# Patient Record
Sex: FEMALE | Race: BLACK OR AFRICAN AMERICAN | Employment: UNEMPLOYED | URBAN - METROPOLITAN AREA
[De-identification: names, ages, dates, MRNs, and addresses within clinical notes are randomized per-mention and may not be internally consistent; named-entity substitution may affect disease eponyms.]

---

## 2021-01-01 ENCOUNTER — APPOINTMENT (OUTPATIENT)
Dept: GENERAL RADIOLOGY | Age: 36
DRG: 720 | End: 2021-01-01
Attending: INTERNAL MEDICINE
Payer: MEDICAID

## 2021-01-01 ENCOUNTER — APPOINTMENT (OUTPATIENT)
Dept: CT IMAGING | Age: 36
DRG: 720 | End: 2021-01-01
Attending: STUDENT IN AN ORGANIZED HEALTH CARE EDUCATION/TRAINING PROGRAM
Payer: MEDICAID

## 2021-01-01 ENCOUNTER — ANESTHESIA (OUTPATIENT)
Dept: ENDOSCOPY | Age: 36
DRG: 720 | End: 2021-01-01
Payer: MEDICAID

## 2021-01-01 ENCOUNTER — APPOINTMENT (OUTPATIENT)
Dept: INTERVENTIONAL RADIOLOGY/VASCULAR | Age: 36
DRG: 720 | End: 2021-01-01
Attending: INTERNAL MEDICINE
Payer: MEDICAID

## 2021-01-01 ENCOUNTER — APPOINTMENT (OUTPATIENT)
Dept: GENERAL RADIOLOGY | Age: 36
DRG: 720 | End: 2021-01-01
Attending: STUDENT IN AN ORGANIZED HEALTH CARE EDUCATION/TRAINING PROGRAM
Payer: MEDICAID

## 2021-01-01 ENCOUNTER — ANESTHESIA EVENT (OUTPATIENT)
Dept: ENDOSCOPY | Age: 36
DRG: 720 | End: 2021-01-01
Payer: MEDICAID

## 2021-01-01 ENCOUNTER — APPOINTMENT (OUTPATIENT)
Dept: GENERAL RADIOLOGY | Age: 36
DRG: 720 | End: 2021-01-01
Attending: RADIOLOGY
Payer: MEDICAID

## 2021-01-01 ENCOUNTER — APPOINTMENT (OUTPATIENT)
Dept: CT IMAGING | Age: 36
DRG: 720 | End: 2021-01-01
Attending: INTERNAL MEDICINE
Payer: MEDICAID

## 2021-01-01 ENCOUNTER — APPOINTMENT (OUTPATIENT)
Dept: ENDOSCOPY | Age: 36
DRG: 720 | End: 2021-01-01
Attending: INTERNAL MEDICINE
Payer: MEDICAID

## 2021-01-01 ENCOUNTER — APPOINTMENT (OUTPATIENT)
Dept: GENERAL RADIOLOGY | Age: 36
DRG: 720 | End: 2021-01-01
Attending: HOSPITALIST
Payer: MEDICAID

## 2021-01-01 ENCOUNTER — HOSPITAL ENCOUNTER (INPATIENT)
Age: 36
LOS: 16 days | DRG: 720 | End: 2021-11-02
Attending: STUDENT IN AN ORGANIZED HEALTH CARE EDUCATION/TRAINING PROGRAM | Admitting: INTERNAL MEDICINE
Payer: MEDICAID

## 2021-01-01 ENCOUNTER — ANESTHESIA (OUTPATIENT)
Dept: ICU | Age: 36
DRG: 720 | End: 2021-01-01
Payer: MEDICAID

## 2021-01-01 ENCOUNTER — ANESTHESIA EVENT (OUTPATIENT)
Dept: ICU | Age: 36
DRG: 720 | End: 2021-01-01
Payer: MEDICAID

## 2021-01-01 VITALS — WEIGHT: 74.96 LBS | HEIGHT: 66 IN | BODY MASS INDEX: 12.05 KG/M2 | OXYGEN SATURATION: 94 % | TEMPERATURE: 97.6 F

## 2021-01-01 DIAGNOSIS — J18.9 MULTIFOCAL PNEUMONIA: Primary | ICD-10-CM

## 2021-01-01 DIAGNOSIS — I21.3 ST ELEVATION MYOCARDIAL INFARCTION (STEMI), UNSPECIFIED ARTERY (HCC): ICD-10-CM

## 2021-01-01 DIAGNOSIS — R62.7 FAILURE TO THRIVE IN ADULT: ICD-10-CM

## 2021-01-01 LAB
ALBUMIN SERPL-MCNC: 1.6 G/DL (ref 3.5–5)
ALBUMIN SERPL-MCNC: 1.7 G/DL (ref 3.5–5)
ALBUMIN SERPL-MCNC: 1.8 G/DL (ref 3.5–5)
ALBUMIN SERPL-MCNC: 2 G/DL (ref 3.5–5)
ALBUMIN/GLOB SERPL: 0.4 {RATIO} (ref 1.1–2.2)
ALBUMIN/GLOB SERPL: 0.5 {RATIO} (ref 1.1–2.2)
ALP SERPL-CCNC: 70 U/L (ref 45–117)
ALP SERPL-CCNC: 93 U/L (ref 45–117)
ALT SERPL-CCNC: 13 U/L (ref 12–78)
ALT SERPL-CCNC: 40 U/L (ref 12–78)
ANION GAP SERPL CALC-SCNC: 15 MMOL/L (ref 5–15)
ANION GAP SERPL CALC-SCNC: 15 MMOL/L (ref 5–15)
ANION GAP SERPL CALC-SCNC: 3 MMOL/L (ref 5–15)
ANION GAP SERPL CALC-SCNC: 3 MMOL/L (ref 5–15)
ANION GAP SERPL CALC-SCNC: 4 MMOL/L (ref 5–15)
ANION GAP SERPL CALC-SCNC: 5 MMOL/L (ref 5–15)
ANION GAP SERPL CALC-SCNC: 6 MMOL/L (ref 5–15)
ANION GAP SERPL CALC-SCNC: 7 MMOL/L (ref 5–15)
APPEARANCE UR: ABNORMAL
ARTERIAL PATENCY WRIST A: ABNORMAL
AST SERPL W P-5'-P-CCNC: 25 U/L (ref 15–37)
AST SERPL W P-5'-P-CCNC: 27 U/L (ref 15–37)
ATRIAL RATE: 104 BPM
ATRIAL RATE: 110 BPM
ATRIAL RATE: 138 BPM
BACTERIA SPEC CULT: ABNORMAL
BACTERIA SPEC CULT: ABNORMAL
BACTERIA SPEC CULT: NORMAL
BACTERIA URNS QL MICRO: NEGATIVE /HPF
BASE EXCESS BLDA CALC-SCNC: 0.9 MMOL/L (ref 0–2)
BASE EXCESS BLDA CALC-SCNC: 4.4 MMOL/L (ref 0–2)
BASE EXCESS BLDA CALC-SCNC: 5.6 MMOL/L (ref 0–2)
BASE EXCESS BLDA CALC-SCNC: 5.6 MMOL/L (ref 0–2)
BASE EXCESS BLDA CALC-SCNC: 6.7 MMOL/L (ref 0–2)
BASE EXCESS BLDA CALC-SCNC: 7.8 MMOL/L (ref 0–2)
BASOPHILS # BLD: 0 K/UL (ref 0–0.1)
BASOPHILS NFR BLD: 0 % (ref 0–1)
BDY SITE: ABNORMAL
BILIRUB SERPL-MCNC: 0.5 MG/DL (ref 0.2–1)
BILIRUB SERPL-MCNC: 0.7 MG/DL (ref 0.2–1)
BILIRUB UR QL: NEGATIVE
BUN SERPL-MCNC: 10 MG/DL (ref 6–20)
BUN SERPL-MCNC: 13 MG/DL (ref 6–20)
BUN SERPL-MCNC: 14 MG/DL (ref 6–20)
BUN SERPL-MCNC: 25 MG/DL (ref 6–20)
BUN SERPL-MCNC: 3 MG/DL (ref 6–20)
BUN SERPL-MCNC: 35 MG/DL (ref 6–20)
BUN SERPL-MCNC: 36 MG/DL (ref 6–20)
BUN SERPL-MCNC: 36 MG/DL (ref 6–20)
BUN SERPL-MCNC: 6 MG/DL (ref 6–20)
BUN SERPL-MCNC: 9 MG/DL (ref 6–20)
BUN/CREAT SERPL: 12 (ref 12–20)
BUN/CREAT SERPL: 144 (ref 12–20)
BUN/CREAT SERPL: 144 (ref 12–20)
BUN/CREAT SERPL: 35 (ref 12–20)
BUN/CREAT SERPL: 35 (ref 12–20)
BUN/CREAT SERPL: 50 (ref 12–20)
BUN/CREAT SERPL: 89 (ref 12–20)
BUN/CREAT SERPL: 97 (ref 12–20)
BUN/CREAT SERPL: ABNORMAL (ref 12–20)
CA-I BLD-MCNC: 7.2 MG/DL (ref 8.5–10.1)
CA-I BLD-MCNC: 7.2 MG/DL (ref 8.5–10.1)
CA-I BLD-MCNC: 7.9 MG/DL (ref 8.5–10.1)
CA-I BLD-MCNC: 8 MG/DL (ref 8.5–10.1)
CA-I BLD-MCNC: 8.5 MG/DL (ref 8.5–10.1)
CA-I BLD-MCNC: 8.7 MG/DL (ref 8.5–10.1)
CA-I BLD-MCNC: 8.7 MG/DL (ref 8.5–10.1)
CA-I BLD-MCNC: 8.8 MG/DL (ref 8.5–10.1)
CALCULATED P AXIS, ECG09: 69 DEGREES
CALCULATED P AXIS, ECG09: 87 DEGREES
CALCULATED P AXIS, ECG09: 90 DEGREES
CALCULATED R AXIS, ECG10: 102 DEGREES
CALCULATED R AXIS, ECG10: 57 DEGREES
CALCULATED R AXIS, ECG10: 79 DEGREES
CALCULATED T AXIS, ECG11: 66 DEGREES
CALCULATED T AXIS, ECG11: 86 DEGREES
CALCULATED T AXIS, ECG11: 97 DEGREES
CHLORIDE SERPL-SCNC: 103 MMOL/L (ref 97–108)
CHLORIDE SERPL-SCNC: 108 MMOL/L (ref 97–108)
CHLORIDE SERPL-SCNC: 108 MMOL/L (ref 97–108)
CHLORIDE SERPL-SCNC: 109 MMOL/L (ref 97–108)
CHLORIDE SERPL-SCNC: 110 MMOL/L (ref 97–108)
CHLORIDE SERPL-SCNC: 112 MMOL/L (ref 97–108)
CHLORIDE SERPL-SCNC: 113 MMOL/L (ref 97–108)
CHLORIDE SERPL-SCNC: 126 MMOL/L (ref 97–108)
CHLORIDE SERPL-SCNC: 128 MMOL/L (ref 97–108)
CO2 SERPL-SCNC: 23 MMOL/L (ref 21–32)
CO2 SERPL-SCNC: 23 MMOL/L (ref 21–32)
CO2 SERPL-SCNC: 25 MMOL/L (ref 21–32)
CO2 SERPL-SCNC: 26 MMOL/L (ref 21–32)
CO2 SERPL-SCNC: 28 MMOL/L (ref 21–32)
CO2 SERPL-SCNC: 29 MMOL/L (ref 21–32)
CO2 SERPL-SCNC: 30 MMOL/L (ref 21–32)
CO2 SERPL-SCNC: 30 MMOL/L (ref 21–32)
CO2 SERPL-SCNC: 31 MMOL/L (ref 21–32)
COLOR UR: ABNORMAL
COVID-19 RAPID TEST, COVR: NOT DETECTED
CREAT SERPL-MCNC: 0.2 MG/DL (ref 0.55–1.02)
CREAT SERPL-MCNC: 0.25 MG/DL (ref 0.55–1.02)
CREAT SERPL-MCNC: 0.26 MG/DL (ref 0.55–1.02)
CREAT SERPL-MCNC: 0.26 MG/DL (ref 0.55–1.02)
CREAT SERPL-MCNC: 0.28 MG/DL (ref 0.55–1.02)
CREAT SERPL-MCNC: 0.36 MG/DL (ref 0.55–1.02)
CREAT SERPL-MCNC: <0.15 MG/DL (ref 0.55–1.02)
DIAGNOSIS, 93000: NORMAL
DIFFERENTIAL METHOD BLD: ABNORMAL
EOSINOPHIL # BLD: 0 K/UL (ref 0–0.4)
EOSINOPHIL # BLD: 0.1 K/UL (ref 0–0.4)
EOSINOPHIL NFR BLD: 0 % (ref 0–7)
EOSINOPHIL NFR BLD: 1 % (ref 0–7)
EOSINOPHIL NFR BLD: 1 % (ref 0–7)
EPAP/CPAP/PEEP, PAPEEP: 10
EPAP/CPAP/PEEP, PAPEEP: 10
EPAP/CPAP/PEEP, PAPEEP: 12
EPAP/CPAP/PEEP, PAPEEP: 5
ERYTHROCYTE [DISTWIDTH] IN BLOOD BY AUTOMATED COUNT: 13.4 % (ref 11.5–14.5)
ERYTHROCYTE [DISTWIDTH] IN BLOOD BY AUTOMATED COUNT: 13.5 % (ref 11.5–14.5)
ERYTHROCYTE [DISTWIDTH] IN BLOOD BY AUTOMATED COUNT: 13.6 % (ref 11.5–14.5)
ERYTHROCYTE [DISTWIDTH] IN BLOOD BY AUTOMATED COUNT: 13.9 % (ref 11.5–14.5)
ERYTHROCYTE [DISTWIDTH] IN BLOOD BY AUTOMATED COUNT: 14.1 % (ref 11.5–14.5)
ERYTHROCYTE [DISTWIDTH] IN BLOOD BY AUTOMATED COUNT: 14.2 % (ref 11.5–14.5)
ERYTHROCYTE [DISTWIDTH] IN BLOOD BY AUTOMATED COUNT: 14.2 % (ref 11.5–14.5)
ERYTHROCYTE [DISTWIDTH] IN BLOOD BY AUTOMATED COUNT: 14.3 % (ref 11.5–14.5)
ERYTHROCYTE [DISTWIDTH] IN BLOOD BY AUTOMATED COUNT: 14.5 % (ref 11.5–14.5)
ERYTHROCYTE [DISTWIDTH] IN BLOOD BY AUTOMATED COUNT: 14.6 % (ref 11.5–14.5)
ERYTHROCYTE [DISTWIDTH] IN BLOOD BY AUTOMATED COUNT: 14.7 % (ref 11.5–14.5)
ERYTHROCYTE [DISTWIDTH] IN BLOOD BY AUTOMATED COUNT: 15.1 % (ref 11.5–14.5)
FIO2 ON VENT: 100 %
FIO2 ON VENT: 28 %
FIO2 ON VENT: 50 %
GAS FLOW.O2 O2 DELIVERY SYS: 2 L/MIN
GAS FLOW.O2 O2 DELIVERY SYS: 21 L/MIN
GAS FLOW.O2 SETTING OXYMISER: 12 L/MIN
GAS FLOW.O2 SETTING OXYMISER: 16 L/MIN
GLOBULIN SER CALC-MCNC: 3.6 G/DL (ref 2–4)
GLOBULIN SER CALC-MCNC: 4.6 G/DL (ref 2–4)
GLUCOSE BLD STRIP.AUTO-MCNC: 101 MG/DL (ref 65–117)
GLUCOSE BLD STRIP.AUTO-MCNC: 102 MG/DL (ref 65–117)
GLUCOSE BLD STRIP.AUTO-MCNC: 103 MG/DL (ref 65–117)
GLUCOSE BLD STRIP.AUTO-MCNC: 106 MG/DL (ref 65–117)
GLUCOSE BLD STRIP.AUTO-MCNC: 110 MG/DL (ref 65–117)
GLUCOSE BLD STRIP.AUTO-MCNC: 112 MG/DL (ref 65–117)
GLUCOSE BLD STRIP.AUTO-MCNC: 119 MG/DL (ref 65–117)
GLUCOSE BLD STRIP.AUTO-MCNC: 122 MG/DL (ref 65–117)
GLUCOSE BLD STRIP.AUTO-MCNC: 123 MG/DL (ref 65–117)
GLUCOSE BLD STRIP.AUTO-MCNC: 125 MG/DL (ref 65–117)
GLUCOSE BLD STRIP.AUTO-MCNC: 126 MG/DL (ref 65–117)
GLUCOSE BLD STRIP.AUTO-MCNC: 141 MG/DL (ref 65–117)
GLUCOSE BLD STRIP.AUTO-MCNC: 143 MG/DL (ref 65–117)
GLUCOSE BLD STRIP.AUTO-MCNC: 191 MG/DL (ref 65–117)
GLUCOSE BLD STRIP.AUTO-MCNC: 42 MG/DL (ref 65–117)
GLUCOSE BLD STRIP.AUTO-MCNC: 56 MG/DL (ref 65–117)
GLUCOSE BLD STRIP.AUTO-MCNC: 70 MG/DL (ref 65–117)
GLUCOSE BLD STRIP.AUTO-MCNC: 80 MG/DL (ref 65–117)
GLUCOSE BLD STRIP.AUTO-MCNC: 88 MG/DL (ref 65–117)
GLUCOSE SERPL-MCNC: 100 MG/DL (ref 65–100)
GLUCOSE SERPL-MCNC: 105 MG/DL (ref 65–100)
GLUCOSE SERPL-MCNC: 112 MG/DL (ref 65–100)
GLUCOSE SERPL-MCNC: 119 MG/DL (ref 65–100)
GLUCOSE SERPL-MCNC: 123 MG/DL (ref 65–100)
GLUCOSE SERPL-MCNC: 139 MG/DL (ref 65–100)
GLUCOSE SERPL-MCNC: 140 MG/DL (ref 65–100)
GLUCOSE SERPL-MCNC: 170 MG/DL (ref 65–100)
GLUCOSE SERPL-MCNC: 88 MG/DL (ref 65–100)
GLUCOSE SERPL-MCNC: 92 MG/DL (ref 65–100)
GLUCOSE SERPL-MCNC: 93 MG/DL (ref 65–100)
GLUCOSE SERPL-MCNC: 93 MG/DL (ref 65–100)
GLUCOSE SERPL-MCNC: 96 MG/DL (ref 65–100)
GLUCOSE UR STRIP.AUTO-MCNC: NEGATIVE MG/DL
GRAN CASTS URNS QL MICRO: 41 /LPF
HCO3 BLDA-SCNC: 25 MMOL/L (ref 22–26)
HCO3 BLDA-SCNC: 28 MMOL/L (ref 22–26)
HCO3 BLDA-SCNC: 30 MMOL/L (ref 22–26)
HCT VFR BLD AUTO: 28.6 % (ref 35–47)
HCT VFR BLD AUTO: 30.5 % (ref 35–47)
HCT VFR BLD AUTO: 31 % (ref 35–47)
HCT VFR BLD AUTO: 31 % (ref 35–47)
HCT VFR BLD AUTO: 32.8 % (ref 35–47)
HCT VFR BLD AUTO: 36.4 % (ref 35–47)
HCT VFR BLD AUTO: 39.8 % (ref 35–47)
HCT VFR BLD AUTO: 40.5 % (ref 35–47)
HCT VFR BLD AUTO: 44.5 % (ref 35–47)
HCT VFR BLD AUTO: 48.9 % (ref 35–47)
HCT VFR BLD AUTO: 49.1 % (ref 35–47)
HCT VFR BLD AUTO: 57.7 % (ref 35–47)
HGB BLD-MCNC: 10 G/DL (ref 11.5–16)
HGB BLD-MCNC: 10.5 G/DL (ref 11.5–16)
HGB BLD-MCNC: 11.5 G/DL (ref 11.5–16)
HGB BLD-MCNC: 12.9 G/DL (ref 11.5–16)
HGB BLD-MCNC: 13 G/DL (ref 11.5–16)
HGB BLD-MCNC: 13.4 G/DL (ref 11.5–16)
HGB BLD-MCNC: 14.2 G/DL (ref 11.5–16)
HGB BLD-MCNC: 14.6 G/DL (ref 11.5–16)
HGB BLD-MCNC: 16.9 G/DL (ref 11.5–16)
HGB BLD-MCNC: 9.3 G/DL (ref 11.5–16)
HGB BLD-MCNC: 9.7 G/DL (ref 11.5–16)
HGB BLD-MCNC: 9.8 G/DL (ref 11.5–16)
HGB UR QL STRIP: ABNORMAL
IMM GRANULOCYTES # BLD AUTO: 0 K/UL
IMM GRANULOCYTES # BLD AUTO: 0 K/UL (ref 0–0.04)
IMM GRANULOCYTES # BLD AUTO: 0.1 K/UL
IMM GRANULOCYTES # BLD AUTO: 0.1 K/UL (ref 0–0.04)
IMM GRANULOCYTES # BLD AUTO: 0.2 K/UL (ref 0–0.04)
IMM GRANULOCYTES # BLD AUTO: 0.6 K/UL (ref 0–0.04)
IMM GRANULOCYTES NFR BLD AUTO: 0 %
IMM GRANULOCYTES NFR BLD AUTO: 0 % (ref 0–0.5)
IMM GRANULOCYTES NFR BLD AUTO: 0 % (ref 0–0.5)
IMM GRANULOCYTES NFR BLD AUTO: 1 %
IMM GRANULOCYTES NFR BLD AUTO: 1 % (ref 0–0.5)
IMM GRANULOCYTES NFR BLD AUTO: 2 % (ref 0–0.5)
KETONES UR QL STRIP.AUTO: 5 MG/DL
LACTATE SERPL-SCNC: 1.1 MMOL/L (ref 0.4–2)
LACTATE SERPL-SCNC: 1.8 MMOL/L (ref 0.4–2)
LACTATE SERPL-SCNC: 2.2 MMOL/L (ref 0.4–2)
LACTATE SERPL-SCNC: 3.2 MMOL/L (ref 0.4–2)
LACTATE SERPL-SCNC: 3.2 MMOL/L (ref 0.4–2)
LEUKOCYTE ESTERASE UR QL STRIP.AUTO: NEGATIVE
LYMPHOCYTES # BLD: 0.6 K/UL (ref 0.8–3.5)
LYMPHOCYTES # BLD: 1.1 K/UL (ref 0.8–3.5)
LYMPHOCYTES # BLD: 1.2 K/UL (ref 0.8–3.5)
LYMPHOCYTES # BLD: 1.2 K/UL (ref 0.8–3.5)
LYMPHOCYTES # BLD: 1.4 K/UL (ref 0.8–3.5)
LYMPHOCYTES # BLD: 1.4 K/UL (ref 0.8–3.5)
LYMPHOCYTES # BLD: 1.6 K/UL (ref 0.8–3.5)
LYMPHOCYTES # BLD: 1.6 K/UL (ref 0.8–3.5)
LYMPHOCYTES # BLD: 1.7 K/UL (ref 0.8–3.5)
LYMPHOCYTES # BLD: 2 K/UL (ref 0.8–3.5)
LYMPHOCYTES # BLD: 2.8 K/UL (ref 0.8–3.5)
LYMPHOCYTES NFR BLD: 10 % (ref 12–49)
LYMPHOCYTES NFR BLD: 10 % (ref 12–49)
LYMPHOCYTES NFR BLD: 12 % (ref 12–49)
LYMPHOCYTES NFR BLD: 14 % (ref 12–49)
LYMPHOCYTES NFR BLD: 15 % (ref 12–49)
LYMPHOCYTES NFR BLD: 19 % (ref 12–49)
LYMPHOCYTES NFR BLD: 32 % (ref 12–49)
LYMPHOCYTES NFR BLD: 4 % (ref 12–49)
LYMPHOCYTES NFR BLD: 8 % (ref 12–49)
MAGNESIUM SERPL-MCNC: 1.8 MG/DL (ref 1.6–2.4)
MAGNESIUM SERPL-MCNC: 1.9 MG/DL (ref 1.6–2.4)
MAGNESIUM SERPL-MCNC: 2 MG/DL (ref 1.6–2.4)
MAGNESIUM SERPL-MCNC: 2.3 MG/DL (ref 1.6–2.4)
MCH RBC QN AUTO: 27.7 PG (ref 26–34)
MCH RBC QN AUTO: 27.8 PG (ref 26–34)
MCH RBC QN AUTO: 27.9 PG (ref 26–34)
MCH RBC QN AUTO: 28 PG (ref 26–34)
MCH RBC QN AUTO: 28.1 PG (ref 26–34)
MCH RBC QN AUTO: 28.1 PG (ref 26–34)
MCH RBC QN AUTO: 28.2 PG (ref 26–34)
MCH RBC QN AUTO: 28.2 PG (ref 26–34)
MCH RBC QN AUTO: 28.3 PG (ref 26–34)
MCH RBC QN AUTO: 28.3 PG (ref 26–34)
MCHC RBC AUTO-ENTMCNC: 29 G/DL (ref 30–36.5)
MCHC RBC AUTO-ENTMCNC: 29.3 G/DL (ref 30–36.5)
MCHC RBC AUTO-ENTMCNC: 29.7 G/DL (ref 30–36.5)
MCHC RBC AUTO-ENTMCNC: 30.1 G/DL (ref 30–36.5)
MCHC RBC AUTO-ENTMCNC: 31.6 G/DL (ref 30–36.5)
MCHC RBC AUTO-ENTMCNC: 31.6 G/DL (ref 30–36.5)
MCHC RBC AUTO-ENTMCNC: 31.8 G/DL (ref 30–36.5)
MCHC RBC AUTO-ENTMCNC: 31.9 G/DL (ref 30–36.5)
MCHC RBC AUTO-ENTMCNC: 32 G/DL (ref 30–36.5)
MCHC RBC AUTO-ENTMCNC: 32.3 G/DL (ref 30–36.5)
MCHC RBC AUTO-ENTMCNC: 32.5 G/DL (ref 30–36.5)
MCHC RBC AUTO-ENTMCNC: 32.7 G/DL (ref 30–36.5)
MCV RBC AUTO: 86.1 FL (ref 80–99)
MCV RBC AUTO: 86.9 FL (ref 80–99)
MCV RBC AUTO: 87.3 FL (ref 80–99)
MCV RBC AUTO: 87.7 FL (ref 80–99)
MCV RBC AUTO: 87.9 FL (ref 80–99)
MCV RBC AUTO: 87.9 FL (ref 80–99)
MCV RBC AUTO: 89.3 FL (ref 80–99)
MCV RBC AUTO: 89.7 FL (ref 80–99)
MCV RBC AUTO: 92.9 FL (ref 80–99)
MCV RBC AUTO: 93.9 FL (ref 80–99)
MCV RBC AUTO: 94.7 FL (ref 80–99)
MCV RBC AUTO: 95.3 FL (ref 80–99)
MONOCYTES # BLD: 0.1 K/UL (ref 0–1)
MONOCYTES # BLD: 0.2 K/UL (ref 0–1)
MONOCYTES # BLD: 0.2 K/UL (ref 0–1)
MONOCYTES # BLD: 0.3 K/UL (ref 0–1)
MONOCYTES # BLD: 0.4 K/UL (ref 0–1)
MONOCYTES # BLD: 0.4 K/UL (ref 0–1)
MONOCYTES # BLD: 0.5 K/UL (ref 0–1)
MONOCYTES # BLD: 0.6 K/UL (ref 0–1)
MONOCYTES # BLD: 0.8 K/UL (ref 0–1)
MONOCYTES NFR BLD: 1 % (ref 5–13)
MONOCYTES NFR BLD: 1 % (ref 5–13)
MONOCYTES NFR BLD: 2 % (ref 5–13)
MONOCYTES NFR BLD: 2 % (ref 5–13)
MONOCYTES NFR BLD: 3 % (ref 5–13)
MONOCYTES NFR BLD: 5 % (ref 5–13)
MONOCYTES NFR BLD: 6 % (ref 5–13)
MONOCYTES NFR BLD: 8 % (ref 5–13)
MRSA DNA SPEC QL NAA+PROBE: NOT DETECTED
MUCOUS THREADS URNS QL MICRO: ABNORMAL /LPF
NEUTS SEG # BLD: 11.5 K/UL (ref 1.8–8)
NEUTS SEG # BLD: 12.6 K/UL (ref 1.8–8)
NEUTS SEG # BLD: 14 K/UL (ref 1.8–8)
NEUTS SEG # BLD: 16.6 K/UL (ref 1.8–8)
NEUTS SEG # BLD: 17.6 K/UL (ref 1.8–8)
NEUTS SEG # BLD: 22.2 K/UL (ref 1.8–8)
NEUTS SEG # BLD: 3.4 K/UL (ref 1.8–8)
NEUTS SEG # BLD: 4 K/UL (ref 1.8–8)
NEUTS SEG # BLD: 6.9 K/UL (ref 1.8–8)
NEUTS SEG # BLD: 8.2 K/UL (ref 1.8–8)
NEUTS SEG # BLD: 8.5 K/UL (ref 1.8–8)
NEUTS SEG NFR BLD: 62 % (ref 32–75)
NEUTS SEG NFR BLD: 77 % (ref 32–75)
NEUTS SEG NFR BLD: 79 % (ref 32–75)
NEUTS SEG NFR BLD: 80 % (ref 32–75)
NEUTS SEG NFR BLD: 84 % (ref 32–75)
NEUTS SEG NFR BLD: 84 % (ref 32–75)
NEUTS SEG NFR BLD: 85 % (ref 32–75)
NEUTS SEG NFR BLD: 87 % (ref 32–75)
NEUTS SEG NFR BLD: 87 % (ref 32–75)
NEUTS SEG NFR BLD: 89 % (ref 32–75)
NEUTS SEG NFR BLD: 92 % (ref 32–75)
NITRITE UR QL STRIP.AUTO: NEGATIVE
NRBC # BLD: 0 K/UL (ref 0–0.01)
NRBC # BLD: 0.02 K/UL (ref 0–0.01)
NRBC # BLD: 0.03 K/UL (ref 0–0.01)
NRBC BLD-RTO: 0 PER 100 WBC
NRBC BLD-RTO: 0.1 PER 100 WBC
NRBC BLD-RTO: 0.1 PER 100 WBC
NRBC BLD-RTO: 0.2 PER 100 WBC
P-R INTERVAL, ECG05: 118 MS
P-R INTERVAL, ECG05: 126 MS
P-R INTERVAL, ECG05: 146 MS
PCO2 BLDA: 34 MMHG (ref 35–45)
PCO2 BLDA: 35 MMHG (ref 35–45)
PCO2 BLDA: 36 MMHG (ref 35–45)
PCO2 BLDA: 39 MMHG (ref 35–45)
PCO2 BLDA: 40 MMHG (ref 35–45)
PCO2 BLDA: 46 MMHG (ref 35–45)
PERFORMED BY, TECHID: ABNORMAL
PERFORMED BY, TECHID: NORMAL
PH BLDA: 7.42 [PH] (ref 7.35–7.45)
PH BLDA: 7.42 [PH] (ref 7.35–7.45)
PH BLDA: 7.49 [PH] (ref 7.35–7.45)
PH BLDA: 7.52 [PH] (ref 7.35–7.45)
PH BLDA: 7.54 [PH] (ref 7.35–7.45)
PH BLDA: 7.55 [PH] (ref 7.35–7.45)
PH UR STRIP: 5 [PH] (ref 5–8)
PHOSPHATE SERPL-MCNC: 1.1 MG/DL (ref 2.6–4.7)
PHOSPHATE SERPL-MCNC: 1.4 MG/DL (ref 2.6–4.7)
PHOSPHATE SERPL-MCNC: 2 MG/DL (ref 2.6–4.7)
PHOSPHATE SERPL-MCNC: 2 MG/DL (ref 2.6–4.7)
PHOSPHATE SERPL-MCNC: 2.2 MG/DL (ref 2.6–4.7)
PHOSPHATE SERPL-MCNC: 2.6 MG/DL (ref 2.6–4.7)
PHOSPHATE SERPL-MCNC: 2.9 MG/DL (ref 2.6–4.7)
PLATELET # BLD AUTO: 166 K/UL (ref 150–400)
PLATELET # BLD AUTO: 172 K/UL (ref 150–400)
PLATELET # BLD AUTO: 175 K/UL (ref 150–400)
PLATELET # BLD AUTO: 184 K/UL (ref 150–400)
PLATELET # BLD AUTO: 192 K/UL (ref 150–400)
PLATELET # BLD AUTO: 198 K/UL (ref 150–400)
PLATELET # BLD AUTO: 214 K/UL (ref 150–400)
PLATELET # BLD AUTO: 224 K/UL (ref 150–400)
PLATELET # BLD AUTO: 232 K/UL (ref 150–400)
PLATELET # BLD AUTO: 240 K/UL (ref 150–400)
PLATELET # BLD AUTO: 272 K/UL (ref 150–400)
PLATELET # BLD AUTO: 277 K/UL (ref 150–400)
PMV BLD AUTO: 10 FL (ref 8.9–12.9)
PMV BLD AUTO: 10 FL (ref 8.9–12.9)
PMV BLD AUTO: 10.1 FL (ref 8.9–12.9)
PMV BLD AUTO: 10.2 FL (ref 8.9–12.9)
PMV BLD AUTO: 10.4 FL (ref 8.9–12.9)
PMV BLD AUTO: 10.5 FL (ref 8.9–12.9)
PMV BLD AUTO: 10.8 FL (ref 8.9–12.9)
PMV BLD AUTO: 11.7 FL (ref 8.9–12.9)
PMV BLD AUTO: 9.3 FL (ref 8.9–12.9)
PMV BLD AUTO: 9.4 FL (ref 8.9–12.9)
PMV BLD AUTO: 9.7 FL (ref 8.9–12.9)
PO2 BLDA: 115 MMHG (ref 75–100)
PO2 BLDA: 123 MMHG (ref 75–100)
PO2 BLDA: 217 MMHG (ref 75–100)
PO2 BLDA: 300 MMHG (ref 75–100)
PO2 BLDA: 41 MMHG (ref 75–100)
PO2 BLDA: 43 MMHG (ref 75–100)
POTASSIUM SERPL-SCNC: 2.4 MMOL/L (ref 3.5–5.1)
POTASSIUM SERPL-SCNC: 2.4 MMOL/L (ref 3.5–5.1)
POTASSIUM SERPL-SCNC: 2.6 MMOL/L (ref 3.5–5.1)
POTASSIUM SERPL-SCNC: 3 MMOL/L (ref 3.5–5.1)
POTASSIUM SERPL-SCNC: 3.2 MMOL/L (ref 3.5–5.1)
POTASSIUM SERPL-SCNC: 3.6 MMOL/L (ref 3.5–5.1)
POTASSIUM SERPL-SCNC: 3.6 MMOL/L (ref 3.5–5.1)
POTASSIUM SERPL-SCNC: 3.9 MMOL/L (ref 3.5–5.1)
POTASSIUM SERPL-SCNC: 4 MMOL/L (ref 3.5–5.1)
POTASSIUM SERPL-SCNC: 4.1 MMOL/L (ref 3.5–5.1)
POTASSIUM SERPL-SCNC: 4.2 MMOL/L (ref 3.5–5.1)
POTASSIUM SERPL-SCNC: 4.2 MMOL/L (ref 3.5–5.1)
POTASSIUM SERPL-SCNC: 4.3 MMOL/L (ref 3.5–5.1)
POTASSIUM SERPL-SCNC: 4.8 MMOL/L (ref 3.5–5.1)
PRESSURE SUPPORT SETTING VENT: 8 CM[H2O]
PROCALCITONIN SERPL-MCNC: 0.84 NG/ML
PROCALCITONIN SERPL-MCNC: 1.93 NG/ML
PROCALCITONIN SERPL-MCNC: 2.21 NG/ML
PROT SERPL-MCNC: 5.3 G/DL (ref 6.4–8.2)
PROT SERPL-MCNC: 6.3 G/DL (ref 6.4–8.2)
PROT UR STRIP-MCNC: 30 MG/DL
Q-T INTERVAL, ECG07: 288 MS
Q-T INTERVAL, ECG07: 324 MS
Q-T INTERVAL, ECG07: 362 MS
QRS DURATION, ECG06: 72 MS
QRS DURATION, ECG06: 76 MS
QRS DURATION, ECG06: 80 MS
QTC CALCULATION (BEZET), ECG08: 426 MS
QTC CALCULATION (BEZET), ECG08: 436 MS
QTC CALCULATION (BEZET), ECG08: 489 MS
RBC # BLD AUTO: 3.29 M/UL (ref 3.8–5.2)
RBC # BLD AUTO: 3.47 M/UL (ref 3.8–5.2)
RBC # BLD AUTO: 3.47 M/UL (ref 3.8–5.2)
RBC # BLD AUTO: 3.55 M/UL (ref 3.8–5.2)
RBC # BLD AUTO: 3.74 M/UL (ref 3.8–5.2)
RBC # BLD AUTO: 4.06 M/UL (ref 3.8–5.2)
RBC # BLD AUTO: 4.61 M/UL (ref 3.8–5.2)
RBC # BLD AUTO: 4.62 M/UL (ref 3.8–5.2)
RBC # BLD AUTO: 4.79 M/UL (ref 3.8–5.2)
RBC # BLD AUTO: 5.13 M/UL (ref 3.8–5.2)
RBC # BLD AUTO: 5.23 M/UL (ref 3.8–5.2)
RBC # BLD AUTO: 6.09 M/UL (ref 3.8–5.2)
RBC #/AREA URNS HPF: >100 /HPF (ref 0–5)
RBC MORPH BLD: ABNORMAL
SAO2 % BLD: 100 %
SAO2 % BLD: 100 %
SAO2 % BLD: 79 %
SAO2 % BLD: 85 %
SAO2 % BLD: 97 %
SAO2 % BLD: >100 %
SAO2% DEVICE SAO2% SENSOR NAME: ABNORMAL
SERVICE CMNT-IMP: ABNORMAL
SERVICE CMNT-IMP: ABNORMAL
SODIUM SERPL-SCNC: 138 MMOL/L (ref 136–145)
SODIUM SERPL-SCNC: 140 MMOL/L (ref 136–145)
SODIUM SERPL-SCNC: 142 MMOL/L (ref 136–145)
SODIUM SERPL-SCNC: 143 MMOL/L (ref 136–145)
SODIUM SERPL-SCNC: 143 MMOL/L (ref 136–145)
SODIUM SERPL-SCNC: 144 MMOL/L (ref 136–145)
SODIUM SERPL-SCNC: 147 MMOL/L (ref 136–145)
SODIUM SERPL-SCNC: 160 MMOL/L (ref 136–145)
SODIUM SERPL-SCNC: 161 MMOL/L (ref 136–145)
SODIUM SERPL-SCNC: 164 MMOL/L (ref 136–145)
SODIUM SERPL-SCNC: 164 MMOL/L (ref 136–145)
SP GR UR REFRACTOMETRY: 1.03 (ref 1–1.03)
SPECIAL REQUESTS,SREQ: ABNORMAL
SPECIAL REQUESTS,SREQ: NORMAL
SPECIMEN SITE: ABNORMAL
SPECIMEN SOURCE: NORMAL
TROPONIN-HIGH SENSITIVITY: 160 NG/L (ref 0–51)
TROPONIN-HIGH SENSITIVITY: 236 NG/L (ref 0–51)
UROBILINOGEN UR QL STRIP.AUTO: 4 EU/DL (ref 0.1–1)
VANCOMYCIN SERPL-MCNC: 6.8 UG/ML
VENTILATION MODE VENT: ABNORMAL
VENTRICULAR RATE, ECG03: 104 BPM
VENTRICULAR RATE, ECG03: 110 BPM
VENTRICULAR RATE, ECG03: 138 BPM
VT SETTING VENT: 350 ML
WBC # BLD AUTO: 10 K/UL (ref 3.6–11)
WBC # BLD AUTO: 10.2 K/UL (ref 3.6–11)
WBC # BLD AUTO: 13.6 K/UL (ref 3.6–11)
WBC # BLD AUTO: 14.1 K/UL (ref 3.6–11)
WBC # BLD AUTO: 16.2 K/UL (ref 3.6–11)
WBC # BLD AUTO: 18.9 K/UL (ref 3.6–11)
WBC # BLD AUTO: 20.9 K/UL (ref 3.6–11)
WBC # BLD AUTO: 24.4 K/UL (ref 3.6–11)
WBC # BLD AUTO: 4.3 K/UL (ref 3.6–11)
WBC # BLD AUTO: 6.4 K/UL (ref 3.6–11)
WBC # BLD AUTO: 9 K/UL (ref 3.6–11)
WBC # BLD AUTO: 9.6 K/UL (ref 3.6–11)
WBC URNS QL MICRO: ABNORMAL /HPF (ref 0–4)

## 2021-01-01 PROCEDURE — 74011000258 HC RX REV CODE- 258: Performed by: INTERNAL MEDICINE

## 2021-01-01 PROCEDURE — 71045 X-RAY EXAM CHEST 1 VIEW: CPT

## 2021-01-01 PROCEDURE — 76040000008: Performed by: INTERNAL MEDICINE

## 2021-01-01 PROCEDURE — 74011250637 HC RX REV CODE- 250/637: Performed by: INTERNAL MEDICINE

## 2021-01-01 PROCEDURE — 36415 COLL VENOUS BLD VENIPUNCTURE: CPT

## 2021-01-01 PROCEDURE — 94003 VENT MGMT INPAT SUBQ DAY: CPT

## 2021-01-01 PROCEDURE — 82962 GLUCOSE BLOOD TEST: CPT

## 2021-01-01 PROCEDURE — 74011000250 HC RX REV CODE- 250: Performed by: HOSPITALIST

## 2021-01-01 PROCEDURE — 77030005122 HC CATH GASTMY PEG BSC -B: Performed by: INTERNAL MEDICINE

## 2021-01-01 PROCEDURE — 65270000029 HC RM PRIVATE

## 2021-01-01 PROCEDURE — 94640 AIRWAY INHALATION TREATMENT: CPT

## 2021-01-01 PROCEDURE — 74011250637 HC RX REV CODE- 250/637: Performed by: HOSPITALIST

## 2021-01-01 PROCEDURE — 74011250636 HC RX REV CODE- 250/636: Performed by: INTERNAL MEDICINE

## 2021-01-01 PROCEDURE — 85025 COMPLETE CBC W/AUTO DIFF WBC: CPT

## 2021-01-01 PROCEDURE — 74011000250 HC RX REV CODE- 250: Performed by: INTERNAL MEDICINE

## 2021-01-01 PROCEDURE — 74011250637 HC RX REV CODE- 250/637: Performed by: PHYSICIAN ASSISTANT

## 2021-01-01 PROCEDURE — 93005 ELECTROCARDIOGRAM TRACING: CPT

## 2021-01-01 PROCEDURE — 82803 BLOOD GASES ANY COMBINATION: CPT

## 2021-01-01 PROCEDURE — 83735 ASSAY OF MAGNESIUM: CPT

## 2021-01-01 PROCEDURE — 83605 ASSAY OF LACTIC ACID: CPT

## 2021-01-01 PROCEDURE — 76937 US GUIDE VASCULAR ACCESS: CPT

## 2021-01-01 PROCEDURE — 74011000636 HC RX REV CODE- 636: Performed by: STUDENT IN AN ORGANIZED HEALTH CARE EDUCATION/TRAINING PROGRAM

## 2021-01-01 PROCEDURE — 65610000006 HC RM INTENSIVE CARE

## 2021-01-01 PROCEDURE — 71275 CT ANGIOGRAPHY CHEST: CPT

## 2021-01-01 PROCEDURE — 94002 VENT MGMT INPAT INIT DAY: CPT

## 2021-01-01 PROCEDURE — 74011250636 HC RX REV CODE- 250/636

## 2021-01-01 PROCEDURE — 71250 CT THORAX DX C-: CPT

## 2021-01-01 PROCEDURE — 74018 RADEX ABDOMEN 1 VIEW: CPT

## 2021-01-01 PROCEDURE — 94760 N-INVAS EAR/PLS OXIMETRY 1: CPT

## 2021-01-01 PROCEDURE — 36600 WITHDRAWAL OF ARTERIAL BLOOD: CPT

## 2021-01-01 PROCEDURE — 84484 ASSAY OF TROPONIN QUANT: CPT

## 2021-01-01 PROCEDURE — 74011250636 HC RX REV CODE- 250/636: Performed by: NURSE ANESTHETIST, CERTIFIED REGISTERED

## 2021-01-01 PROCEDURE — 84145 PROCALCITONIN (PCT): CPT

## 2021-01-01 PROCEDURE — 80053 COMPREHEN METABOLIC PANEL: CPT

## 2021-01-01 PROCEDURE — 80069 RENAL FUNCTION PANEL: CPT

## 2021-01-01 PROCEDURE — 77010033678 HC OXYGEN DAILY

## 2021-01-01 PROCEDURE — 80202 ASSAY OF VANCOMYCIN: CPT

## 2021-01-01 PROCEDURE — 87641 MR-STAPH DNA AMP PROBE: CPT

## 2021-01-01 PROCEDURE — 94762 N-INVAS EAR/PLS OXIMTRY CONT: CPT

## 2021-01-01 PROCEDURE — 5A1945Z RESPIRATORY VENTILATION, 24-96 CONSECUTIVE HOURS: ICD-10-PCS | Performed by: INTERNAL MEDICINE

## 2021-01-01 PROCEDURE — 74011250636 HC RX REV CODE- 250/636: Performed by: STUDENT IN AN ORGANIZED HEALTH CARE EDUCATION/TRAINING PROGRAM

## 2021-01-01 PROCEDURE — 85027 COMPLETE CBC AUTOMATED: CPT

## 2021-01-01 PROCEDURE — 0DH68UZ INSERTION OF FEEDING DEVICE INTO STOMACH, VIA NATURAL OR ARTIFICIAL OPENING ENDOSCOPIC: ICD-10-PCS | Performed by: INTERNAL MEDICINE

## 2021-01-01 PROCEDURE — 84132 ASSAY OF SERUM POTASSIUM: CPT

## 2021-01-01 PROCEDURE — 81001 URINALYSIS AUTO W/SCOPE: CPT

## 2021-01-01 PROCEDURE — 87040 BLOOD CULTURE FOR BACTERIA: CPT

## 2021-01-01 PROCEDURE — 87086 URINE CULTURE/COLONY COUNT: CPT

## 2021-01-01 PROCEDURE — 96375 TX/PRO/DX INJ NEW DRUG ADDON: CPT

## 2021-01-01 PROCEDURE — 74011000258 HC RX REV CODE- 258: Performed by: PHYSICIAN ASSISTANT

## 2021-01-01 PROCEDURE — 74011000250 HC RX REV CODE- 250

## 2021-01-01 PROCEDURE — 70360 X-RAY EXAM OF NECK: CPT

## 2021-01-01 PROCEDURE — 80048 BASIC METABOLIC PNL TOTAL CA: CPT

## 2021-01-01 PROCEDURE — 84100 ASSAY OF PHOSPHORUS: CPT

## 2021-01-01 PROCEDURE — 74011000258 HC RX REV CODE- 258: Performed by: STUDENT IN AN ORGANIZED HEALTH CARE EDUCATION/TRAINING PROGRAM

## 2021-01-01 PROCEDURE — 0BH17EZ INSERTION OF ENDOTRACHEAL AIRWAY INTO TRACHEA, VIA NATURAL OR ARTIFICIAL OPENING: ICD-10-PCS | Performed by: INTERNAL MEDICINE

## 2021-01-01 PROCEDURE — 87635 SARS-COV-2 COVID-19 AMP PRB: CPT

## 2021-01-01 PROCEDURE — 99285 EMERGENCY DEPT VISIT HI MDM: CPT

## 2021-01-01 PROCEDURE — C1769 GUIDE WIRE: HCPCS

## 2021-01-01 PROCEDURE — 74011250636 HC RX REV CODE- 250/636: Performed by: PHYSICIAN ASSISTANT

## 2021-01-01 PROCEDURE — 76060000033 HC ANESTHESIA 1 TO 1.5 HR: Performed by: INTERNAL MEDICINE

## 2021-01-01 PROCEDURE — 74011250636 HC RX REV CODE- 250/636: Performed by: HOSPITALIST

## 2021-01-01 PROCEDURE — 96374 THER/PROPH/DIAG INJ IV PUSH: CPT

## 2021-01-01 RX ORDER — PROPOFOL 10 MG/ML
INJECTION, EMULSION INTRAVENOUS
Status: COMPLETED
Start: 2021-01-01 | End: 2021-01-01

## 2021-01-01 RX ORDER — MIRTAZAPINE 15 MG/1
TABLET, FILM COATED ORAL
COMMUNITY
Start: 2021-01-01

## 2021-01-01 RX ORDER — DEXTROSE MONOHYDRATE 50 MG/ML
75 INJECTION, SOLUTION INTRAVENOUS CONTINUOUS
Status: DISCONTINUED | OUTPATIENT
Start: 2021-01-01 | End: 2021-01-01

## 2021-01-01 RX ORDER — DEXTROSE 50 % IN WATER (D50W) INTRAVENOUS SYRINGE
25 ONCE
Status: COMPLETED | OUTPATIENT
Start: 2021-01-01 | End: 2021-01-01

## 2021-01-01 RX ORDER — PROPOFOL 10 MG/ML
INJECTION, EMULSION INTRAVENOUS AS NEEDED
Status: DISCONTINUED | OUTPATIENT
Start: 2021-01-01 | End: 2021-01-01 | Stop reason: HOSPADM

## 2021-01-01 RX ORDER — BACLOFEN 10 MG/1
10 TABLET ORAL 3 TIMES DAILY
COMMUNITY

## 2021-01-01 RX ORDER — SUCCINYLCHOLINE CHLORIDE 20 MG/ML
INJECTION INTRAMUSCULAR; INTRAVENOUS
Status: DISPENSED
Start: 2021-01-01 | End: 2021-01-01

## 2021-01-01 RX ORDER — POTASSIUM CHLORIDE 1.5 G/1.77G
20 POWDER, FOR SOLUTION ORAL
Status: DISCONTINUED | OUTPATIENT
Start: 2021-01-01 | End: 2021-11-03 | Stop reason: HOSPADM

## 2021-01-01 RX ORDER — SODIUM CHLORIDE 0.9 % (FLUSH) 0.9 %
5-40 SYRINGE (ML) INJECTION EVERY 8 HOURS
Status: DISCONTINUED | OUTPATIENT
Start: 2021-01-01 | End: 2021-11-03 | Stop reason: HOSPADM

## 2021-01-01 RX ORDER — POTASSIUM CHLORIDE 7.45 MG/ML
10 INJECTION INTRAVENOUS
Status: COMPLETED | OUTPATIENT
Start: 2021-01-01 | End: 2021-01-01

## 2021-01-01 RX ORDER — METOPROLOL SUCCINATE 25 MG/1
25 TABLET, EXTENDED RELEASE ORAL DAILY
Status: DISCONTINUED | OUTPATIENT
Start: 2021-01-01 | End: 2021-11-03 | Stop reason: HOSPADM

## 2021-01-01 RX ORDER — IPRATROPIUM BROMIDE AND ALBUTEROL SULFATE 2.5; .5 MG/3ML; MG/3ML
3 SOLUTION RESPIRATORY (INHALATION)
Status: DISCONTINUED | OUTPATIENT
Start: 2021-01-01 | End: 2021-11-03 | Stop reason: HOSPADM

## 2021-01-01 RX ORDER — PROPOFOL 10 MG/ML
0-50 VIAL (ML) INTRAVENOUS
Status: DISCONTINUED | OUTPATIENT
Start: 2021-01-01 | End: 2021-01-01

## 2021-01-01 RX ORDER — BUDESONIDE 0.5 MG/2ML
500 INHALANT ORAL
Status: DISCONTINUED | OUTPATIENT
Start: 2021-01-01 | End: 2021-11-03 | Stop reason: HOSPADM

## 2021-01-01 RX ORDER — ZOLPIDEM TARTRATE 5 MG/1
5 TABLET ORAL
Status: DISCONTINUED | OUTPATIENT
Start: 2021-01-01 | End: 2021-01-01

## 2021-01-01 RX ORDER — IPRATROPIUM BROMIDE AND ALBUTEROL SULFATE 2.5; .5 MG/3ML; MG/3ML
3 SOLUTION RESPIRATORY (INHALATION)
Status: DISCONTINUED | OUTPATIENT
Start: 2021-01-01 | End: 2021-01-01

## 2021-01-01 RX ORDER — ACETAMINOPHEN 325 MG/1
650 TABLET ORAL
Status: DISCONTINUED | OUTPATIENT
Start: 2021-01-01 | End: 2021-11-03 | Stop reason: HOSPADM

## 2021-01-01 RX ORDER — ONDANSETRON 2 MG/ML
4 INJECTION INTRAMUSCULAR; INTRAVENOUS
Status: DISCONTINUED | OUTPATIENT
Start: 2021-01-01 | End: 2021-11-03 | Stop reason: HOSPADM

## 2021-01-01 RX ORDER — ZOLPIDEM TARTRATE 10 MG/1
TABLET ORAL
COMMUNITY
Start: 2021-01-01

## 2021-01-01 RX ORDER — ONDANSETRON 4 MG/1
4 TABLET, ORALLY DISINTEGRATING ORAL
Status: DISCONTINUED | OUTPATIENT
Start: 2021-01-01 | End: 2021-11-03 | Stop reason: HOSPADM

## 2021-01-01 RX ORDER — DOCUSATE SODIUM 50 MG/5ML
100 LIQUID ORAL 2 TIMES DAILY
Status: DISCONTINUED | OUTPATIENT
Start: 2021-01-01 | End: 2021-11-03 | Stop reason: HOSPADM

## 2021-01-01 RX ORDER — ENOXAPARIN SODIUM 100 MG/ML
40 INJECTION SUBCUTANEOUS DAILY
Status: DISCONTINUED | OUTPATIENT
Start: 2021-01-01 | End: 2021-11-03 | Stop reason: HOSPADM

## 2021-01-01 RX ORDER — MIRTAZAPINE 15 MG/1
15 TABLET, FILM COATED ORAL
Status: DISCONTINUED | OUTPATIENT
Start: 2021-01-01 | End: 2021-11-03 | Stop reason: HOSPADM

## 2021-01-01 RX ORDER — NOREPINEPHRINE BITARTRATE/D5W 8 MG/250ML
.5-16 PLASTIC BAG, INJECTION (ML) INTRAVENOUS
Status: DISCONTINUED | OUTPATIENT
Start: 2021-01-01 | End: 2021-01-01

## 2021-01-01 RX ORDER — METOCLOPRAMIDE HYDROCHLORIDE 5 MG/5ML
5 SOLUTION ORAL EVERY 8 HOURS
Status: DISCONTINUED | OUTPATIENT
Start: 2021-01-01 | End: 2021-11-03 | Stop reason: HOSPADM

## 2021-01-01 RX ORDER — SODIUM CHLORIDE, SODIUM LACTATE, POTASSIUM CHLORIDE, CALCIUM CHLORIDE 600; 310; 30; 20 MG/100ML; MG/100ML; MG/100ML; MG/100ML
INJECTION, SOLUTION INTRAVENOUS
Status: DISCONTINUED | OUTPATIENT
Start: 2021-01-01 | End: 2021-01-01 | Stop reason: HOSPADM

## 2021-01-01 RX ORDER — PANTOPRAZOLE SODIUM 40 MG/1
40 TABLET, DELAYED RELEASE ORAL
Status: DISCONTINUED | OUTPATIENT
Start: 2021-01-01 | End: 2021-11-03 | Stop reason: HOSPADM

## 2021-01-01 RX ORDER — EPINEPHRINE 0.1 MG/ML
INJECTION INTRACARDIAC; INTRAVENOUS
Status: COMPLETED | OUTPATIENT
Start: 2021-01-01 | End: 2021-01-01

## 2021-01-01 RX ORDER — CARBIDOPA AND LEVODOPA 25; 100 MG/1; MG/1
1 TABLET ORAL 2 TIMES DAILY
COMMUNITY

## 2021-01-01 RX ORDER — DEXTROSE 50 % IN WATER (D50W) INTRAVENOUS SYRINGE
Status: COMPLETED | OUTPATIENT
Start: 2021-01-01 | End: 2021-01-01

## 2021-01-01 RX ORDER — POTASSIUM CHLORIDE 7.45 MG/ML
10 INJECTION INTRAVENOUS
Status: DISPENSED | OUTPATIENT
Start: 2021-01-01 | End: 2021-01-01

## 2021-01-01 RX ORDER — ACETAMINOPHEN 650 MG/1
650 SUPPOSITORY RECTAL
Status: DISCONTINUED | OUTPATIENT
Start: 2021-01-01 | End: 2021-11-03 | Stop reason: HOSPADM

## 2021-01-01 RX ORDER — SORBITOL SOLUTION 70 %
30 SOLUTION, ORAL MISCELLANEOUS DAILY
Status: DISCONTINUED | OUTPATIENT
Start: 2021-01-01 | End: 2021-11-03 | Stop reason: HOSPADM

## 2021-01-01 RX ORDER — DEXTROSE 50 % IN WATER (D50W) INTRAVENOUS SYRINGE
Status: COMPLETED
Start: 2021-01-01 | End: 2021-01-01

## 2021-01-01 RX ORDER — POLYETHYLENE GLYCOL 3350 17 G/17G
17 POWDER, FOR SOLUTION ORAL DAILY PRN
Status: DISCONTINUED | OUTPATIENT
Start: 2021-01-01 | End: 2021-11-03 | Stop reason: HOSPADM

## 2021-01-01 RX ORDER — ACETYLCYSTEINE 200 MG/ML
600 SOLUTION ORAL; RESPIRATORY (INHALATION)
Status: DISCONTINUED | OUTPATIENT
Start: 2021-01-01 | End: 2021-01-01

## 2021-01-01 RX ORDER — GLYCOPYRROLATE 1 MG/1
0.5 TABLET ORAL 2 TIMES DAILY
Status: DISCONTINUED | OUTPATIENT
Start: 2021-01-01 | End: 2021-01-01

## 2021-01-01 RX ORDER — SENNOSIDES 8.6 MG/1
2 TABLET ORAL DAILY
Status: DISCONTINUED | OUTPATIENT
Start: 2021-01-01 | End: 2021-11-03 | Stop reason: HOSPADM

## 2021-01-01 RX ORDER — SODIUM CHLORIDE 0.9 % (FLUSH) 0.9 %
5-40 SYRINGE (ML) INJECTION AS NEEDED
Status: DISCONTINUED | OUTPATIENT
Start: 2021-01-01 | End: 2021-11-03 | Stop reason: HOSPADM

## 2021-01-01 RX ORDER — SODIUM CHLORIDE 9 MG/ML
1000 INJECTION, SOLUTION INTRAVENOUS CONTINUOUS
Status: DISCONTINUED | OUTPATIENT
Start: 2021-01-01 | End: 2021-01-01

## 2021-01-01 RX ORDER — SODIUM CHLORIDE 9 MG/ML
100 INJECTION, SOLUTION INTRAVENOUS CONTINUOUS
Status: DISCONTINUED | OUTPATIENT
Start: 2021-01-01 | End: 2021-01-01

## 2021-01-01 RX ORDER — GLYCOPYRROLATE 0.2 MG/ML
0.1 INJECTION INTRAMUSCULAR; INTRAVENOUS EVERY 8 HOURS
Status: DISCONTINUED | OUTPATIENT
Start: 2021-01-01 | End: 2021-01-01

## 2021-01-01 RX ORDER — GLYCOPYRROLATE 1 MG/1
0.5 TABLET ORAL EVERY 8 HOURS
Status: DISCONTINUED | OUTPATIENT
Start: 2021-01-01 | End: 2021-01-01

## 2021-01-01 RX ORDER — DEXTROSE MONOHYDRATE AND SODIUM CHLORIDE 5; .45 G/100ML; G/100ML
250 INJECTION, SOLUTION INTRAVENOUS ONCE
Status: COMPLETED | OUTPATIENT
Start: 2021-01-01 | End: 2021-01-01

## 2021-01-01 RX ORDER — ETOMIDATE 2 MG/ML
INJECTION INTRAVENOUS
Status: DISPENSED
Start: 2021-01-01 | End: 2021-01-01

## 2021-01-01 RX ADMIN — ACETYLCYSTEINE 600 MG: 200 SOLUTION ORAL; RESPIRATORY (INHALATION) at 13:22

## 2021-01-01 RX ADMIN — POTASSIUM CHLORIDE 20 MEQ: 1.5 FOR SOLUTION ORAL at 12:16

## 2021-01-01 RX ADMIN — Medication 10 ML: at 13:27

## 2021-01-01 RX ADMIN — Medication 10 ML: at 22:24

## 2021-01-01 RX ADMIN — Medication 10 ML: at 06:13

## 2021-01-01 RX ADMIN — Medication 10 ML: at 06:03

## 2021-01-01 RX ADMIN — Medication 10 ML: at 06:46

## 2021-01-01 RX ADMIN — GLYCOPYRROLATE 0.5 MG: 1 TABLET ORAL at 12:15

## 2021-01-01 RX ADMIN — IPRATROPIUM BROMIDE AND ALBUTEROL SULFATE 3 ML: .5; 2.5 SOLUTION RESPIRATORY (INHALATION) at 14:06

## 2021-01-01 RX ADMIN — Medication 5 MCG/MIN: at 16:10

## 2021-01-01 RX ADMIN — ENOXAPARIN SODIUM 40 MG: 40 INJECTION SUBCUTANEOUS at 09:07

## 2021-01-01 RX ADMIN — DOCUSATE SODIUM 100 MG: 50 LIQUID ORAL at 10:58

## 2021-01-01 RX ADMIN — PIPERACILLIN SODIUM AND TAZOBACTAM SODIUM 4.5 G: 4; .5 INJECTION, POWDER, LYOPHILIZED, FOR SOLUTION INTRAVENOUS at 18:38

## 2021-01-01 RX ADMIN — POTASSIUM CHLORIDE 10 MEQ: 7.46 INJECTION, SOLUTION INTRAVENOUS at 08:50

## 2021-01-01 RX ADMIN — PIPERACILLIN AND TAZOBACTAM 3.38 G: 3; .375 INJECTION, POWDER, LYOPHILIZED, FOR SOLUTION INTRAVENOUS at 16:38

## 2021-01-01 RX ADMIN — Medication 10 ML: at 21:51

## 2021-01-01 RX ADMIN — VANCOMYCIN HYDROCHLORIDE 500 MG: 500 INJECTION, POWDER, LYOPHILIZED, FOR SOLUTION INTRAVENOUS at 11:23

## 2021-01-01 RX ADMIN — ACETYLCYSTEINE 600 MG: 200 SOLUTION ORAL; RESPIRATORY (INHALATION) at 20:36

## 2021-01-01 RX ADMIN — Medication 12 MCG/MIN: at 05:44

## 2021-01-01 RX ADMIN — Medication 10 ML: at 22:37

## 2021-01-01 RX ADMIN — PIPERACILLIN AND TAZOBACTAM 3.38 G: 3; .375 INJECTION, POWDER, LYOPHILIZED, FOR SOLUTION INTRAVENOUS at 14:35

## 2021-01-01 RX ADMIN — POTASSIUM CHLORIDE 20 MEQ: 1.5 FOR SOLUTION ORAL at 08:26

## 2021-01-01 RX ADMIN — SORBITOL SOLUTION (BULK) 30 ML: 70 SOLUTION at 10:41

## 2021-01-01 RX ADMIN — GLYCOPYRROLATE 0.5 MG: 1 TABLET ORAL at 21:10

## 2021-01-01 RX ADMIN — EPINEPHRINE 1 MG: 0.1 INJECTION, SOLUTION ENDOTRACHEAL; INTRACARDIAC; INTRAVENOUS at 22:40

## 2021-01-01 RX ADMIN — DOCUSATE SODIUM 100 MG: 50 LIQUID ORAL at 12:14

## 2021-01-01 RX ADMIN — SORBITOL SOLUTION (BULK) 30 ML: 70 SOLUTION at 09:28

## 2021-01-01 RX ADMIN — PIPERACILLIN AND TAZOBACTAM 3.38 G: 3; .375 INJECTION, POWDER, LYOPHILIZED, FOR SOLUTION INTRAVENOUS at 23:26

## 2021-01-01 RX ADMIN — SORBITOL SOLUTION (BULK) 30 ML: 70 SOLUTION at 15:08

## 2021-01-01 RX ADMIN — POTASSIUM CHLORIDE 10 MEQ: 7.46 INJECTION, SOLUTION INTRAVENOUS at 15:00

## 2021-01-01 RX ADMIN — DOCUSATE SODIUM 100 MG: 50 LIQUID ORAL at 21:34

## 2021-01-01 RX ADMIN — Medication 10 ML: at 17:04

## 2021-01-01 RX ADMIN — POTASSIUM CHLORIDE 20 MEQ: 1.5 FOR SOLUTION ORAL at 14:02

## 2021-01-01 RX ADMIN — POTASSIUM CHLORIDE 20 MEQ: 1.5 FOR SOLUTION ORAL at 18:07

## 2021-01-01 RX ADMIN — IPRATROPIUM BROMIDE AND ALBUTEROL SULFATE 3 ML: .5; 2.5 SOLUTION RESPIRATORY (INHALATION) at 19:34

## 2021-01-01 RX ADMIN — IPRATROPIUM BROMIDE AND ALBUTEROL SULFATE 3 ML: .5; 2.5 SOLUTION RESPIRATORY (INHALATION) at 13:25

## 2021-01-01 RX ADMIN — PIPERACILLIN AND TAZOBACTAM 3.38 G: 3; .375 INJECTION, POWDER, LYOPHILIZED, FOR SOLUTION INTRAVENOUS at 15:47

## 2021-01-01 RX ADMIN — DOCUSATE SODIUM 100 MG: 50 LIQUID ORAL at 09:28

## 2021-01-01 RX ADMIN — POTASSIUM CHLORIDE 20 MEQ: 1.5 FOR SOLUTION ORAL at 09:50

## 2021-01-01 RX ADMIN — WHITE PETROLATUM,ZINC OXIDE: 57; 17 PASTE TOPICAL at 08:42

## 2021-01-01 RX ADMIN — POTASSIUM CHLORIDE 10 MEQ: 7.46 INJECTION, SOLUTION INTRAVENOUS at 12:29

## 2021-01-01 RX ADMIN — PIPERACILLIN AND TAZOBACTAM 3.38 G: 3; .375 INJECTION, POWDER, LYOPHILIZED, FOR SOLUTION INTRAVENOUS at 01:27

## 2021-01-01 RX ADMIN — Medication 10 ML: at 18:35

## 2021-01-01 RX ADMIN — Medication 10 ML: at 23:41

## 2021-01-01 RX ADMIN — SENNOSIDES 17.2 MG: 8.6 TABLET, FILM COATED ORAL at 13:50

## 2021-01-01 RX ADMIN — Medication 10 MCG/MIN: at 05:34

## 2021-01-01 RX ADMIN — VANCOMYCIN HYDROCHLORIDE 500 MG: 500 INJECTION, POWDER, LYOPHILIZED, FOR SOLUTION INTRAVENOUS at 22:53

## 2021-01-01 RX ADMIN — DEXTROSE MONOHYDRATE 135 ML/HR: 50 INJECTION, SOLUTION INTRAVENOUS at 06:41

## 2021-01-01 RX ADMIN — BUDESONIDE 500 MCG: 0.5 SUSPENSION RESPIRATORY (INHALATION) at 22:19

## 2021-01-01 RX ADMIN — METOCLOPRAMIDE HYDROCHLORIDE 5 MG: 5 SOLUTION ORAL at 23:00

## 2021-01-01 RX ADMIN — WHITE PETROLATUM,ZINC OXIDE: 57; 17 PASTE TOPICAL at 21:26

## 2021-01-01 RX ADMIN — IPRATROPIUM BROMIDE AND ALBUTEROL SULFATE 3 ML: .5; 2.5 SOLUTION RESPIRATORY (INHALATION) at 13:22

## 2021-01-01 RX ADMIN — GLYCOPYRROLATE 0.5 MG: 1 TABLET ORAL at 08:40

## 2021-01-01 RX ADMIN — PIPERACILLIN AND TAZOBACTAM 3.38 G: 3; .375 INJECTION, POWDER, LYOPHILIZED, FOR SOLUTION INTRAVENOUS at 17:43

## 2021-01-01 RX ADMIN — IPRATROPIUM BROMIDE AND ALBUTEROL SULFATE 3 ML: .5; 2.5 SOLUTION RESPIRATORY (INHALATION) at 07:23

## 2021-01-01 RX ADMIN — IPRATROPIUM BROMIDE AND ALBUTEROL SULFATE 3 ML: .5; 2.5 SOLUTION RESPIRATORY (INHALATION) at 14:01

## 2021-01-01 RX ADMIN — IPRATROPIUM BROMIDE AND ALBUTEROL SULFATE 3 ML: .5; 2.5 SOLUTION RESPIRATORY (INHALATION) at 03:41

## 2021-01-01 RX ADMIN — PIPERACILLIN AND TAZOBACTAM 3.38 G: 3; .375 INJECTION, POWDER, LYOPHILIZED, FOR SOLUTION INTRAVENOUS at 16:20

## 2021-01-01 RX ADMIN — Medication 10 ML: at 05:27

## 2021-01-01 RX ADMIN — PIPERACILLIN AND TAZOBACTAM 3.38 G: 3; .375 INJECTION, POWDER, LYOPHILIZED, FOR SOLUTION INTRAVENOUS at 00:26

## 2021-01-01 RX ADMIN — SODIUM PHOSPHATE 1 ENEMA: 7; 19 ENEMA RECTAL at 15:54

## 2021-01-01 RX ADMIN — WHITE PETROLATUM,ZINC OXIDE: 57; 17 PASTE TOPICAL at 10:08

## 2021-01-01 RX ADMIN — ACETYLCYSTEINE 600 MG: 200 SOLUTION ORAL; RESPIRATORY (INHALATION) at 14:39

## 2021-01-01 RX ADMIN — POTASSIUM CHLORIDE 20 MEQ: 1.5 FOR SOLUTION ORAL at 12:29

## 2021-01-01 RX ADMIN — POTASSIUM CHLORIDE 20 MEQ: 1.5 FOR SOLUTION ORAL at 17:04

## 2021-01-01 RX ADMIN — ENOXAPARIN SODIUM 40 MG: 40 INJECTION SUBCUTANEOUS at 11:31

## 2021-01-01 RX ADMIN — PIPERACILLIN AND TAZOBACTAM 3.38 G: 3; .375 INJECTION, POWDER, LYOPHILIZED, FOR SOLUTION INTRAVENOUS at 09:28

## 2021-01-01 RX ADMIN — POTASSIUM CHLORIDE 20 MEQ: 1.5 FOR SOLUTION ORAL at 16:20

## 2021-01-01 RX ADMIN — Medication 10 ML: at 15:15

## 2021-01-01 RX ADMIN — PIPERACILLIN AND TAZOBACTAM 3.38 G: 3; .375 INJECTION, POWDER, LYOPHILIZED, FOR SOLUTION INTRAVENOUS at 16:09

## 2021-01-01 RX ADMIN — Medication 10 ML: at 13:31

## 2021-01-01 RX ADMIN — WHITE PETROLATUM,ZINC OXIDE: 57; 17 PASTE TOPICAL at 17:00

## 2021-01-01 RX ADMIN — ENOXAPARIN SODIUM 40 MG: 40 INJECTION SUBCUTANEOUS at 10:16

## 2021-01-01 RX ADMIN — WHITE PETROLATUM,ZINC OXIDE: 57; 17 PASTE TOPICAL at 18:43

## 2021-01-01 RX ADMIN — PIPERACILLIN AND TAZOBACTAM 3.38 G: 3; .375 INJECTION, POWDER, LYOPHILIZED, FOR SOLUTION INTRAVENOUS at 18:39

## 2021-01-01 RX ADMIN — IPRATROPIUM BROMIDE AND ALBUTEROL SULFATE 3 ML: .5; 2.5 SOLUTION RESPIRATORY (INHALATION) at 20:27

## 2021-01-01 RX ADMIN — POTASSIUM CHLORIDE 20 MEQ: 1.5 FOR SOLUTION ORAL at 11:30

## 2021-01-01 RX ADMIN — IPRATROPIUM BROMIDE AND ALBUTEROL SULFATE 3 ML: .5; 2.5 SOLUTION RESPIRATORY (INHALATION) at 12:40

## 2021-01-01 RX ADMIN — IPRATROPIUM BROMIDE AND ALBUTEROL SULFATE 3 ML: .5; 2.5 SOLUTION RESPIRATORY (INHALATION) at 13:29

## 2021-01-01 RX ADMIN — SORBITOL SOLUTION (BULK) 30 ML: 70 SOLUTION at 11:56

## 2021-01-01 RX ADMIN — DOCUSATE SODIUM 100 MG: 50 LIQUID ORAL at 00:12

## 2021-01-01 RX ADMIN — ACETAMINOPHEN 650 MG: 325 TABLET ORAL at 08:44

## 2021-01-01 RX ADMIN — PANTOPRAZOLE SODIUM 40 MG: 40 TABLET, DELAYED RELEASE ORAL at 11:32

## 2021-01-01 RX ADMIN — PIPERACILLIN AND TAZOBACTAM 3.38 G: 3; .375 INJECTION, POWDER, LYOPHILIZED, FOR SOLUTION INTRAVENOUS at 00:30

## 2021-01-01 RX ADMIN — PROPOFOL 30 MCG/KG/MIN: 10 INJECTION, EMULSION INTRAVENOUS at 05:16

## 2021-01-01 RX ADMIN — IPRATROPIUM BROMIDE AND ALBUTEROL SULFATE 3 ML: .5; 2.5 SOLUTION RESPIRATORY (INHALATION) at 20:26

## 2021-01-01 RX ADMIN — DOCUSATE SODIUM 100 MG: 50 LIQUID ORAL at 22:24

## 2021-01-01 RX ADMIN — PIPERACILLIN AND TAZOBACTAM 3.38 G: 3; .375 INJECTION, POWDER, LYOPHILIZED, FOR SOLUTION INTRAVENOUS at 00:33

## 2021-01-01 RX ADMIN — Medication 10 ML: at 22:54

## 2021-01-01 RX ADMIN — ENOXAPARIN SODIUM 40 MG: 40 INJECTION SUBCUTANEOUS at 09:05

## 2021-01-01 RX ADMIN — PIPERACILLIN AND TAZOBACTAM 3.38 G: 3; .375 INJECTION, POWDER, LYOPHILIZED, FOR SOLUTION INTRAVENOUS at 07:39

## 2021-01-01 RX ADMIN — POTASSIUM CHLORIDE 10 MEQ: 7.46 INJECTION, SOLUTION INTRAVENOUS at 06:12

## 2021-01-01 RX ADMIN — Medication 10 ML: at 06:32

## 2021-01-01 RX ADMIN — Medication 8 MCG/MIN: at 05:24

## 2021-01-01 RX ADMIN — Medication 10 ML: at 22:51

## 2021-01-01 RX ADMIN — IPRATROPIUM BROMIDE AND ALBUTEROL SULFATE 3 ML: .5; 2.5 SOLUTION RESPIRATORY (INHALATION) at 01:15

## 2021-01-01 RX ADMIN — DEXTROSE MONOHYDRATE 25 G: 25 INJECTION, SOLUTION INTRAVENOUS at 22:52

## 2021-01-01 RX ADMIN — Medication 10 ML: at 21:20

## 2021-01-01 RX ADMIN — PROPOFOL 15 MG: 10 INJECTION, EMULSION INTRAVENOUS at 16:02

## 2021-01-01 RX ADMIN — DOCUSATE SODIUM 100 MG: 50 LIQUID ORAL at 21:00

## 2021-01-01 RX ADMIN — ENOXAPARIN SODIUM 40 MG: 40 INJECTION SUBCUTANEOUS at 09:28

## 2021-01-01 RX ADMIN — PIPERACILLIN AND TAZOBACTAM 3.38 G: 3; .375 INJECTION, POWDER, LYOPHILIZED, FOR SOLUTION INTRAVENOUS at 07:55

## 2021-01-01 RX ADMIN — PIPERACILLIN AND TAZOBACTAM 3.38 G: 3; .375 INJECTION, POWDER, LYOPHILIZED, FOR SOLUTION INTRAVENOUS at 05:08

## 2021-01-01 RX ADMIN — PIPERACILLIN AND TAZOBACTAM 3.38 G: 3; .375 INJECTION, POWDER, LYOPHILIZED, FOR SOLUTION INTRAVENOUS at 23:40

## 2021-01-01 RX ADMIN — WHITE PETROLATUM,ZINC OXIDE: 57; 17 PASTE TOPICAL at 23:41

## 2021-01-01 RX ADMIN — GLYCOPYRROLATE 0.5 MG: 1 TABLET ORAL at 09:02

## 2021-01-01 RX ADMIN — BUDESONIDE 500 MCG: 0.5 SUSPENSION RESPIRATORY (INHALATION) at 07:23

## 2021-01-01 RX ADMIN — Medication 6 MCG/MIN: at 05:14

## 2021-01-01 RX ADMIN — POTASSIUM CHLORIDE 20 MEQ: 1.5 FOR SOLUTION ORAL at 07:39

## 2021-01-01 RX ADMIN — Medication 10 ML: at 21:39

## 2021-01-01 RX ADMIN — GLYCOPYRROLATE 0.5 MG: 1 TABLET ORAL at 09:50

## 2021-01-01 RX ADMIN — Medication 10 ML: at 14:48

## 2021-01-01 RX ADMIN — WHITE PETROLATUM,ZINC OXIDE: 57; 17 PASTE TOPICAL at 16:39

## 2021-01-01 RX ADMIN — BUDESONIDE 500 MCG: 0.5 SUSPENSION RESPIRATORY (INHALATION) at 20:55

## 2021-01-01 RX ADMIN — IPRATROPIUM BROMIDE AND ALBUTEROL SULFATE 3 ML: .5; 2.5 SOLUTION RESPIRATORY (INHALATION) at 20:55

## 2021-01-01 RX ADMIN — POTASSIUM CHLORIDE 20 MEQ: 1.5 FOR SOLUTION ORAL at 13:31

## 2021-01-01 RX ADMIN — GLYCOPYRROLATE 0.5 MG: 1 TABLET ORAL at 21:56

## 2021-01-01 RX ADMIN — IPRATROPIUM BROMIDE AND ALBUTEROL SULFATE 3 ML: .5; 2.5 SOLUTION RESPIRATORY (INHALATION) at 13:50

## 2021-01-01 RX ADMIN — POTASSIUM CHLORIDE 20 MEQ: 1.5 FOR SOLUTION ORAL at 12:39

## 2021-01-01 RX ADMIN — POTASSIUM CHLORIDE 20 MEQ: 1.5 FOR SOLUTION ORAL at 17:36

## 2021-01-01 RX ADMIN — POTASSIUM CHLORIDE 20 MEQ: 1.5 FOR SOLUTION ORAL at 12:14

## 2021-01-01 RX ADMIN — DEXTROSE MONOHYDRATE 75 ML/HR: 50 INJECTION, SOLUTION INTRAVENOUS at 08:08

## 2021-01-01 RX ADMIN — Medication 10 ML: at 22:00

## 2021-01-01 RX ADMIN — Medication 4 MCG/MIN: at 05:04

## 2021-01-01 RX ADMIN — DEXTROSE MONOHYDRATE 25 G: 25 INJECTION, SOLUTION INTRAVENOUS at 20:21

## 2021-01-01 RX ADMIN — GLYCOPYRROLATE 0.5 MG: 1 TABLET ORAL at 08:26

## 2021-01-01 RX ADMIN — POTASSIUM CHLORIDE 20 MEQ: 1.5 FOR SOLUTION ORAL at 13:50

## 2021-01-01 RX ADMIN — ENOXAPARIN SODIUM 40 MG: 40 INJECTION SUBCUTANEOUS at 12:15

## 2021-01-01 RX ADMIN — IPRATROPIUM BROMIDE AND ALBUTEROL SULFATE 3 ML: .5; 2.5 SOLUTION RESPIRATORY (INHALATION) at 08:09

## 2021-01-01 RX ADMIN — EPINEPHRINE 1 MG: 0.1 INJECTION, SOLUTION ENDOTRACHEAL; INTRACARDIAC; INTRAVENOUS at 22:53

## 2021-01-01 RX ADMIN — POTASSIUM CHLORIDE 10 MEQ: 7.46 INJECTION, SOLUTION INTRAVENOUS at 11:22

## 2021-01-01 RX ADMIN — DEXTROSE MONOHYDRATE 75 ML/HR: 50 INJECTION, SOLUTION INTRAVENOUS at 09:28

## 2021-01-01 RX ADMIN — POTASSIUM CHLORIDE 20 MEQ: 1.5 FOR SOLUTION ORAL at 08:53

## 2021-01-01 RX ADMIN — Medication 10 ML: at 06:11

## 2021-01-01 RX ADMIN — WHITE PETROLATUM,ZINC OXIDE: 57; 17 PASTE TOPICAL at 08:27

## 2021-01-01 RX ADMIN — IPRATROPIUM BROMIDE AND ALBUTEROL SULFATE 3 ML: .5; 2.5 SOLUTION RESPIRATORY (INHALATION) at 19:33

## 2021-01-01 RX ADMIN — POTASSIUM CHLORIDE 20 MEQ: 1.5 FOR SOLUTION ORAL at 11:56

## 2021-01-01 RX ADMIN — IPRATROPIUM BROMIDE AND ALBUTEROL SULFATE 3 ML: .5; 2.5 SOLUTION RESPIRATORY (INHALATION) at 10:38

## 2021-01-01 RX ADMIN — Medication 13 MCG/MIN: at 05:49

## 2021-01-01 RX ADMIN — Medication 10 ML: at 15:55

## 2021-01-01 RX ADMIN — DOCUSATE SODIUM 100 MG: 50 LIQUID ORAL at 11:56

## 2021-01-01 RX ADMIN — IPRATROPIUM BROMIDE AND ALBUTEROL SULFATE 3 ML: .5; 2.5 SOLUTION RESPIRATORY (INHALATION) at 02:41

## 2021-01-01 RX ADMIN — GLYCOPYRROLATE 0.5 MG: 1 TABLET ORAL at 16:53

## 2021-01-01 RX ADMIN — GLYCOPYRROLATE 0.5 MG: 1 TABLET ORAL at 10:08

## 2021-01-01 RX ADMIN — IPRATROPIUM BROMIDE AND ALBUTEROL SULFATE 3 ML: .5; 2.5 SOLUTION RESPIRATORY (INHALATION) at 21:03

## 2021-01-01 RX ADMIN — PIPERACILLIN AND TAZOBACTAM 3.38 G: 3; .375 INJECTION, POWDER, LYOPHILIZED, FOR SOLUTION INTRAVENOUS at 08:45

## 2021-01-01 RX ADMIN — IPRATROPIUM BROMIDE AND ALBUTEROL SULFATE 3 ML: .5; 2.5 SOLUTION RESPIRATORY (INHALATION) at 12:13

## 2021-01-01 RX ADMIN — POTASSIUM CHLORIDE 20 MEQ: 1.5 FOR SOLUTION ORAL at 13:59

## 2021-01-01 RX ADMIN — SORBITOL SOLUTION (BULK) 30 ML: 70 SOLUTION at 09:51

## 2021-01-01 RX ADMIN — METHYLPREDNISOLONE SODIUM SUCCINATE 40 MG: 40 INJECTION, POWDER, FOR SOLUTION INTRAMUSCULAR; INTRAVENOUS at 16:20

## 2021-01-01 RX ADMIN — SORBITOL SOLUTION (BULK) 30 ML: 70 SOLUTION at 11:31

## 2021-01-01 RX ADMIN — IOPAMIDOL 100 ML: 755 INJECTION, SOLUTION INTRAVENOUS at 17:44

## 2021-01-01 RX ADMIN — EPINEPHRINE 1 MG: 0.1 INJECTION, SOLUTION ENDOTRACHEAL; INTRACARDIAC; INTRAVENOUS at 22:46

## 2021-01-01 RX ADMIN — Medication 5 MCG/MIN: at 05:09

## 2021-01-01 RX ADMIN — POTASSIUM CHLORIDE 20 MEQ: 1.5 FOR SOLUTION ORAL at 13:26

## 2021-01-01 RX ADMIN — EPINEPHRINE 1 MG: 0.1 INJECTION, SOLUTION ENDOTRACHEAL; INTRACARDIAC; INTRAVENOUS at 22:43

## 2021-01-01 RX ADMIN — POTASSIUM CHLORIDE 10 MEQ: 7.46 INJECTION, SOLUTION INTRAVENOUS at 10:07

## 2021-01-01 RX ADMIN — GLYCOPYRROLATE 0.5 MG: 1 TABLET ORAL at 00:13

## 2021-01-01 RX ADMIN — SODIUM CHLORIDE 1000 ML: 9 INJECTION, SOLUTION INTRAVENOUS at 14:51

## 2021-01-01 RX ADMIN — GLYCOPYRROLATE 0.5 MG: 1 TABLET ORAL at 21:04

## 2021-01-01 RX ADMIN — PIPERACILLIN AND TAZOBACTAM 3.38 G: 3; .375 INJECTION, POWDER, LYOPHILIZED, FOR SOLUTION INTRAVENOUS at 00:37

## 2021-01-01 RX ADMIN — POTASSIUM CHLORIDE 20 MEQ: 1.5 FOR SOLUTION ORAL at 09:28

## 2021-01-01 RX ADMIN — POTASSIUM CHLORIDE 20 MEQ: 1.5 FOR SOLUTION ORAL at 09:05

## 2021-01-01 RX ADMIN — PIPERACILLIN AND TAZOBACTAM 3.38 G: 3; .375 INJECTION, POWDER, LYOPHILIZED, FOR SOLUTION INTRAVENOUS at 00:12

## 2021-01-01 RX ADMIN — IPRATROPIUM BROMIDE AND ALBUTEROL SULFATE 3 ML: .5; 2.5 SOLUTION RESPIRATORY (INHALATION) at 08:12

## 2021-01-01 RX ADMIN — GLYCOPYRROLATE 0.5 MG: 1 TABLET ORAL at 08:44

## 2021-01-01 RX ADMIN — IPRATROPIUM BROMIDE AND ALBUTEROL SULFATE 3 ML: .5; 2.5 SOLUTION RESPIRATORY (INHALATION) at 14:02

## 2021-01-01 RX ADMIN — DOCUSATE SODIUM 100 MG: 50 LIQUID ORAL at 10:08

## 2021-01-01 RX ADMIN — POTASSIUM CHLORIDE 10 MEQ: 7.46 INJECTION, SOLUTION INTRAVENOUS at 17:37

## 2021-01-01 RX ADMIN — DEXTROSE MONOHYDRATE 135 ML/HR: 50 INJECTION, SOLUTION INTRAVENOUS at 02:35

## 2021-01-01 RX ADMIN — GLYCOPYRROLATE 0.1 MG: 0.2 INJECTION, SOLUTION INTRAMUSCULAR; INTRAVENOUS at 18:06

## 2021-01-01 RX ADMIN — GLYCOPYRROLATE 0.5 MG: 1 TABLET ORAL at 23:40

## 2021-01-01 RX ADMIN — WHITE PETROLATUM,ZINC OXIDE: 57; 17 PASTE TOPICAL at 00:13

## 2021-01-01 RX ADMIN — POTASSIUM CHLORIDE 20 MEQ: 1.5 FOR SOLUTION ORAL at 18:39

## 2021-01-01 RX ADMIN — ACETAMINOPHEN 650 MG: 325 TABLET ORAL at 13:59

## 2021-01-01 RX ADMIN — PIPERACILLIN AND TAZOBACTAM 3.38 G: 3; .375 INJECTION, POWDER, LYOPHILIZED, FOR SOLUTION INTRAVENOUS at 09:05

## 2021-01-01 RX ADMIN — Medication 10 ML: at 21:09

## 2021-01-01 RX ADMIN — POTASSIUM CHLORIDE 20 MEQ: 1.5 FOR SOLUTION ORAL at 16:10

## 2021-01-01 RX ADMIN — ENOXAPARIN SODIUM 40 MG: 40 INJECTION SUBCUTANEOUS at 08:55

## 2021-01-01 RX ADMIN — IPRATROPIUM BROMIDE AND ALBUTEROL SULFATE 3 ML: .5; 2.5 SOLUTION RESPIRATORY (INHALATION) at 06:52

## 2021-01-01 RX ADMIN — PROPOFOL 20 MG: 10 INJECTION, EMULSION INTRAVENOUS at 04:51

## 2021-01-01 RX ADMIN — Medication 10 ML: at 21:56

## 2021-01-01 RX ADMIN — POTASSIUM CHLORIDE 20 MEQ: 1.5 FOR SOLUTION ORAL at 16:53

## 2021-01-01 RX ADMIN — PIPERACILLIN AND TAZOBACTAM 3.38 G: 3; .375 INJECTION, POWDER, LYOPHILIZED, FOR SOLUTION INTRAVENOUS at 17:19

## 2021-01-01 RX ADMIN — Medication 10 ML: at 06:00

## 2021-01-01 RX ADMIN — Medication 10 ML: at 16:41

## 2021-01-01 RX ADMIN — WHITE PETROLATUM,ZINC OXIDE: 57; 17 PASTE TOPICAL at 21:11

## 2021-01-01 RX ADMIN — PIPERACILLIN AND TAZOBACTAM 3.38 G: 3; .375 INJECTION, POWDER, LYOPHILIZED, FOR SOLUTION INTRAVENOUS at 03:05

## 2021-01-01 RX ADMIN — METOCLOPRAMIDE HYDROCHLORIDE 5 MG: 5 SOLUTION ORAL at 11:56

## 2021-01-01 RX ADMIN — PIPERACILLIN AND TAZOBACTAM 3.38 G: 3; .375 INJECTION, POWDER, LYOPHILIZED, FOR SOLUTION INTRAVENOUS at 18:11

## 2021-01-01 RX ADMIN — IPRATROPIUM BROMIDE AND ALBUTEROL SULFATE 3 ML: .5; 2.5 SOLUTION RESPIRATORY (INHALATION) at 02:19

## 2021-01-01 RX ADMIN — DOCUSATE SODIUM 100 MG: 50 LIQUID ORAL at 11:31

## 2021-01-01 RX ADMIN — ENOXAPARIN SODIUM 40 MG: 40 INJECTION SUBCUTANEOUS at 09:02

## 2021-01-01 RX ADMIN — GLYCOPYRROLATE 0.5 MG: 1 TABLET ORAL at 08:54

## 2021-01-01 RX ADMIN — DEXTROSE MONOHYDRATE 135 ML/HR: 50 INJECTION, SOLUTION INTRAVENOUS at 14:14

## 2021-01-01 RX ADMIN — PROPOFOL 30 MCG/KG/MIN: 10 INJECTION, EMULSION INTRAVENOUS at 01:07

## 2021-01-01 RX ADMIN — PIPERACILLIN AND TAZOBACTAM 3.38 G: 3; .375 INJECTION, POWDER, LYOPHILIZED, FOR SOLUTION INTRAVENOUS at 09:06

## 2021-01-01 RX ADMIN — DEXTROSE MONOHYDRATE 75 ML/HR: 50 INJECTION, SOLUTION INTRAVENOUS at 22:19

## 2021-01-01 RX ADMIN — IPRATROPIUM BROMIDE AND ALBUTEROL SULFATE 3 ML: .5; 2.5 SOLUTION RESPIRATORY (INHALATION) at 20:59

## 2021-01-01 RX ADMIN — ACETAMINOPHEN 650 MG: 325 TABLET ORAL at 08:53

## 2021-01-01 RX ADMIN — GLYCOPYRROLATE 0.5 MG: 1 TABLET ORAL at 10:59

## 2021-01-01 RX ADMIN — DEXTROSE MONOHYDRATE 135 ML/HR: 50 INJECTION, SOLUTION INTRAVENOUS at 23:15

## 2021-01-01 RX ADMIN — Medication 10 ML: at 06:12

## 2021-01-01 RX ADMIN — ACETYLCYSTEINE 600 MG: 200 SOLUTION ORAL; RESPIRATORY (INHALATION) at 07:23

## 2021-01-01 RX ADMIN — WHITE PETROLATUM,ZINC OXIDE: 57; 17 PASTE TOPICAL at 09:04

## 2021-01-01 RX ADMIN — POTASSIUM CHLORIDE 10 MEQ: 7.46 INJECTION, SOLUTION INTRAVENOUS at 22:37

## 2021-01-01 RX ADMIN — PIPERACILLIN AND TAZOBACTAM 3.38 G: 3; .375 INJECTION, POWDER, LYOPHILIZED, FOR SOLUTION INTRAVENOUS at 16:41

## 2021-01-01 RX ADMIN — GLYCOPYRROLATE 0.5 MG: 1 TABLET ORAL at 18:14

## 2021-01-01 RX ADMIN — ACETYLCYSTEINE 600 MG: 200 SOLUTION ORAL; RESPIRATORY (INHALATION) at 08:26

## 2021-01-01 RX ADMIN — PROPOFOL 100 MG: 10 INJECTION, EMULSION INTRAVENOUS at 04:33

## 2021-01-01 RX ADMIN — DOCUSATE SODIUM 100 MG: 50 LIQUID ORAL at 21:03

## 2021-01-01 RX ADMIN — BUDESONIDE 500 MCG: 0.5 SUSPENSION RESPIRATORY (INHALATION) at 20:36

## 2021-01-01 RX ADMIN — VANCOMYCIN HYDROCHLORIDE 750 MG: 750 INJECTION, POWDER, LYOPHILIZED, FOR SOLUTION INTRAVENOUS at 14:34

## 2021-01-01 RX ADMIN — ENOXAPARIN SODIUM 40 MG: 40 INJECTION SUBCUTANEOUS at 11:56

## 2021-01-01 RX ADMIN — DOCUSATE SODIUM 100 MG: 50 LIQUID ORAL at 10:07

## 2021-01-01 RX ADMIN — PROPOFOL 25 MG: 10 INJECTION, EMULSION INTRAVENOUS at 15:56

## 2021-01-01 RX ADMIN — Medication 10 ML: at 15:08

## 2021-01-01 RX ADMIN — WHITE PETROLATUM,ZINC OXIDE: 57; 17 PASTE TOPICAL at 16:10

## 2021-01-01 RX ADMIN — GLYCOPYRROLATE 0.5 MG: 1 TABLET ORAL at 21:17

## 2021-01-01 RX ADMIN — METHYLPREDNISOLONE SODIUM SUCCINATE 100 MG: 125 INJECTION, POWDER, FOR SOLUTION INTRAMUSCULAR; INTRAVENOUS at 12:00

## 2021-01-01 RX ADMIN — GLYCOPYRROLATE 0.5 MG: 1 TABLET ORAL at 15:06

## 2021-01-01 RX ADMIN — POTASSIUM CHLORIDE 20 MEQ: 1.5 FOR SOLUTION ORAL at 08:40

## 2021-01-01 RX ADMIN — GLYCOPYRROLATE 0.5 MG: 1 TABLET ORAL at 21:03

## 2021-01-01 RX ADMIN — ENOXAPARIN SODIUM 40 MG: 40 INJECTION SUBCUTANEOUS at 09:52

## 2021-01-01 RX ADMIN — POTASSIUM CHLORIDE 20 MEQ: 1.5 FOR SOLUTION ORAL at 17:19

## 2021-01-01 RX ADMIN — DOCUSATE SODIUM 100 MG: 50 LIQUID ORAL at 10:50

## 2021-01-01 RX ADMIN — PROPOFOL 30 MCG/KG/MIN: 10 INJECTION, EMULSION INTRAVENOUS at 08:52

## 2021-01-01 RX ADMIN — DOCUSATE SODIUM 100 MG: 50 LIQUID ORAL at 09:50

## 2021-01-01 RX ADMIN — POTASSIUM CHLORIDE 20 MEQ: 1.5 FOR SOLUTION ORAL at 10:59

## 2021-01-01 RX ADMIN — WHITE PETROLATUM,ZINC OXIDE: 57; 17 PASTE TOPICAL at 09:52

## 2021-01-01 RX ADMIN — PIPERACILLIN AND TAZOBACTAM 3.38 G: 3; .375 INJECTION, POWDER, LYOPHILIZED, FOR SOLUTION INTRAVENOUS at 21:00

## 2021-01-01 RX ADMIN — METOCLOPRAMIDE HYDROCHLORIDE 5 MG: 5 SOLUTION ORAL at 17:04

## 2021-01-01 RX ADMIN — GLYCOPYRROLATE 0.5 MG: 1 TABLET ORAL at 10:23

## 2021-01-01 RX ADMIN — DEXTROSE MONOHYDRATE 75 ML/HR: 50 INJECTION, SOLUTION INTRAVENOUS at 04:10

## 2021-01-01 RX ADMIN — PIPERACILLIN AND TAZOBACTAM 3.38 G: 3; .375 INJECTION, POWDER, LYOPHILIZED, FOR SOLUTION INTRAVENOUS at 08:53

## 2021-01-01 RX ADMIN — PIPERACILLIN AND TAZOBACTAM 3.38 G: 3; .375 INJECTION, POWDER, LYOPHILIZED, FOR SOLUTION INTRAVENOUS at 08:26

## 2021-01-01 RX ADMIN — PIPERACILLIN AND TAZOBACTAM 3.38 G: 3; .375 INJECTION, POWDER, LYOPHILIZED, FOR SOLUTION INTRAVENOUS at 08:08

## 2021-01-01 RX ADMIN — POTASSIUM CHLORIDE 20 MEQ: 1.5 FOR SOLUTION ORAL at 07:55

## 2021-01-01 RX ADMIN — ENOXAPARIN SODIUM 40 MG: 40 INJECTION SUBCUTANEOUS at 10:09

## 2021-01-01 RX ADMIN — WHITE PETROLATUM,ZINC OXIDE: 57; 17 PASTE TOPICAL at 22:52

## 2021-01-01 RX ADMIN — PIPERACILLIN AND TAZOBACTAM 3.38 G: 3; .375 INJECTION, POWDER, LYOPHILIZED, FOR SOLUTION INTRAVENOUS at 17:00

## 2021-01-01 RX ADMIN — WHITE PETROLATUM,ZINC OXIDE: 57; 17 PASTE TOPICAL at 18:36

## 2021-01-01 RX ADMIN — METOPROLOL SUCCINATE 25 MG: 25 TABLET, EXTENDED RELEASE ORAL at 11:56

## 2021-01-01 RX ADMIN — IPRATROPIUM BROMIDE AND ALBUTEROL SULFATE 3 ML: .5; 2.5 SOLUTION RESPIRATORY (INHALATION) at 19:23

## 2021-01-01 RX ADMIN — POTASSIUM CHLORIDE 10 MEQ: 7.46 INJECTION, SOLUTION INTRAVENOUS at 08:00

## 2021-01-01 RX ADMIN — DEXTROSE MONOHYDRATE 75 ML/HR: 50 INJECTION, SOLUTION INTRAVENOUS at 15:08

## 2021-01-01 RX ADMIN — WHITE PETROLATUM,ZINC OXIDE: 57; 17 PASTE TOPICAL at 14:12

## 2021-01-01 RX ADMIN — ENOXAPARIN SODIUM 40 MG: 40 INJECTION SUBCUTANEOUS at 15:07

## 2021-01-01 RX ADMIN — PIPERACILLIN AND TAZOBACTAM 3.38 G: 3; .375 INJECTION, POWDER, LYOPHILIZED, FOR SOLUTION INTRAVENOUS at 17:04

## 2021-01-01 RX ADMIN — BUDESONIDE 500 MCG: 0.5 SUSPENSION RESPIRATORY (INHALATION) at 08:26

## 2021-01-01 RX ADMIN — WHITE PETROLATUM,ZINC OXIDE: 57; 17 PASTE TOPICAL at 08:45

## 2021-01-01 RX ADMIN — GLYCOPYRROLATE 0.5 MG: 1 TABLET ORAL at 21:24

## 2021-01-01 RX ADMIN — SORBITOL SOLUTION (BULK) 30 ML: 70 SOLUTION at 08:45

## 2021-01-01 RX ADMIN — Medication 5 MCG/MIN: at 16:37

## 2021-01-01 RX ADMIN — WHITE PETROLATUM,ZINC OXIDE: 57; 17 PASTE TOPICAL at 23:33

## 2021-01-01 RX ADMIN — DEXTROSE 50 % IN WATER (D50W) INTRAVENOUS SYRINGE 25 G: at 08:54

## 2021-01-01 RX ADMIN — IPRATROPIUM BROMIDE AND ALBUTEROL SULFATE 3 ML: .5; 2.5 SOLUTION RESPIRATORY (INHALATION) at 22:19

## 2021-01-01 RX ADMIN — SORBITOL SOLUTION (BULK) 30 ML: 70 SOLUTION at 08:26

## 2021-01-01 RX ADMIN — SORBITOL SOLUTION (BULK) 30 ML: 70 SOLUTION at 10:08

## 2021-01-01 RX ADMIN — POTASSIUM CHLORIDE 20 MEQ: 1.5 FOR SOLUTION ORAL at 12:32

## 2021-01-01 RX ADMIN — WHITE PETROLATUM,ZINC OXIDE: 57; 17 PASTE TOPICAL at 22:00

## 2021-01-01 RX ADMIN — WHITE PETROLATUM,ZINC OXIDE: 57; 17 PASTE TOPICAL at 21:25

## 2021-01-01 RX ADMIN — POTASSIUM CHLORIDE 20 MEQ: 1.5 FOR SOLUTION ORAL at 18:11

## 2021-01-01 RX ADMIN — POTASSIUM CHLORIDE 20 MEQ: 1.5 FOR SOLUTION ORAL at 17:53

## 2021-01-01 RX ADMIN — DOCUSATE SODIUM 100 MG: 50 LIQUID ORAL at 08:26

## 2021-01-01 RX ADMIN — DOCUSATE SODIUM 100 MG: 50 LIQUID ORAL at 21:10

## 2021-01-01 RX ADMIN — ACETYLCYSTEINE 600 MG: 200 SOLUTION ORAL; RESPIRATORY (INHALATION) at 02:38

## 2021-01-01 RX ADMIN — IPRATROPIUM BROMIDE AND ALBUTEROL SULFATE 3 ML: .5; 2.5 SOLUTION RESPIRATORY (INHALATION) at 08:26

## 2021-01-01 RX ADMIN — IPRATROPIUM BROMIDE AND ALBUTEROL SULFATE 3 ML: .5; 2.5 SOLUTION RESPIRATORY (INHALATION) at 08:02

## 2021-01-01 RX ADMIN — ACETYLCYSTEINE 600 MG: 200 SOLUTION ORAL; RESPIRATORY (INHALATION) at 20:54

## 2021-01-01 RX ADMIN — IPRATROPIUM BROMIDE AND ALBUTEROL SULFATE 3 ML: .5; 2.5 SOLUTION RESPIRATORY (INHALATION) at 13:31

## 2021-01-01 RX ADMIN — PROPOFOL 35 MCG/KG/MIN: 10 INJECTION, EMULSION INTRAVENOUS at 06:52

## 2021-01-01 RX ADMIN — Medication 10 ML: at 05:45

## 2021-01-01 RX ADMIN — Medication 11 MCG/MIN: at 05:39

## 2021-01-01 RX ADMIN — Medication 10 ML: at 13:59

## 2021-01-01 RX ADMIN — Medication 10 ML: at 14:36

## 2021-01-01 RX ADMIN — PROPOFOL 25 MCG/KG/MIN: 10 INJECTION, EMULSION INTRAVENOUS at 05:48

## 2021-01-01 RX ADMIN — PIPERACILLIN AND TAZOBACTAM 3.38 G: 3; .375 INJECTION, POWDER, LYOPHILIZED, FOR SOLUTION INTRAVENOUS at 01:05

## 2021-01-01 RX ADMIN — EPINEPHRINE 1 MG: 0.1 INJECTION, SOLUTION ENDOTRACHEAL; INTRACARDIAC; INTRAVENOUS at 22:37

## 2021-01-01 RX ADMIN — Medication 10 ML: at 21:10

## 2021-01-01 RX ADMIN — PIPERACILLIN AND TAZOBACTAM 3.38 G: 3; .375 INJECTION, POWDER, LYOPHILIZED, FOR SOLUTION INTRAVENOUS at 07:25

## 2021-01-01 RX ADMIN — Medication 7 MCG/MIN: at 05:19

## 2021-01-01 RX ADMIN — PROPOFOL 20 MCG/KG/MIN: 10 INJECTION, EMULSION INTRAVENOUS at 05:43

## 2021-01-01 RX ADMIN — GLYCOPYRROLATE 0.1 MG: 0.2 INJECTION, SOLUTION INTRAMUSCULAR; INTRAVENOUS at 02:00

## 2021-01-01 RX ADMIN — Medication 10 ML: at 05:06

## 2021-01-01 RX ADMIN — METOCLOPRAMIDE HYDROCHLORIDE 5 MG: 5 SOLUTION ORAL at 11:31

## 2021-01-01 RX ADMIN — WHITE PETROLATUM,ZINC OXIDE: 57; 17 PASTE TOPICAL at 15:49

## 2021-01-01 RX ADMIN — Medication 9 MCG/MIN: at 05:29

## 2021-01-01 RX ADMIN — WHITE PETROLATUM,ZINC OXIDE: 57; 17 PASTE TOPICAL at 22:25

## 2021-01-01 RX ADMIN — Medication 10 ML: at 05:32

## 2021-01-01 RX ADMIN — POTASSIUM CHLORIDE 10 MEQ: 7.46 INJECTION, SOLUTION INTRAVENOUS at 14:30

## 2021-01-01 RX ADMIN — IPRATROPIUM BROMIDE AND ALBUTEROL SULFATE 3 ML: .5; 2.5 SOLUTION RESPIRATORY (INHALATION) at 08:40

## 2021-01-01 RX ADMIN — ACETYLCYSTEINE 600 MG: 200 SOLUTION ORAL; RESPIRATORY (INHALATION) at 02:42

## 2021-01-01 RX ADMIN — DEXTROSE AND SODIUM CHLORIDE 250 ML: 5; 450 INJECTION, SOLUTION INTRAVENOUS at 06:13

## 2021-01-01 RX ADMIN — ENOXAPARIN SODIUM 40 MG: 40 INJECTION SUBCUTANEOUS at 08:44

## 2021-01-01 RX ADMIN — SENNOSIDES 17.2 MG: 8.6 TABLET, FILM COATED ORAL at 11:56

## 2021-01-01 RX ADMIN — WHITE PETROLATUM,ZINC OXIDE: 57; 17 PASTE TOPICAL at 17:24

## 2021-01-01 RX ADMIN — PANTOPRAZOLE SODIUM 40 MG: 40 TABLET, DELAYED RELEASE ORAL at 11:56

## 2021-01-01 RX ADMIN — PIPERACILLIN AND TAZOBACTAM 3.38 G: 3; .375 INJECTION, POWDER, LYOPHILIZED, FOR SOLUTION INTRAVENOUS at 11:31

## 2021-01-01 RX ADMIN — PIPERACILLIN AND TAZOBACTAM 3.38 G: 3; .375 INJECTION, POWDER, LYOPHILIZED, FOR SOLUTION INTRAVENOUS at 08:17

## 2021-01-01 RX ADMIN — ENOXAPARIN SODIUM 40 MG: 40 INJECTION SUBCUTANEOUS at 08:40

## 2021-01-01 RX ADMIN — WHITE PETROLATUM,ZINC OXIDE: 57; 17 PASTE TOPICAL at 08:56

## 2021-01-01 RX ADMIN — Medication 10 ML: at 06:24

## 2021-01-01 RX ADMIN — Medication 10 ML: at 16:40

## 2021-01-01 RX ADMIN — SODIUM CHLORIDE 100 ML/HR: 9 INJECTION, SOLUTION INTRAVENOUS at 22:37

## 2021-01-01 RX ADMIN — Medication 10 ML: at 05:02

## 2021-01-01 RX ADMIN — Medication 10 ML: at 21:25

## 2021-01-01 RX ADMIN — WHITE PETROLATUM,ZINC OXIDE: 57; 17 PASTE TOPICAL at 21:39

## 2021-01-01 RX ADMIN — Medication 10 ML: at 18:08

## 2021-01-01 RX ADMIN — IPRATROPIUM BROMIDE AND ALBUTEROL SULFATE 3 ML: .5; 2.5 SOLUTION RESPIRATORY (INHALATION) at 06:48

## 2021-01-01 RX ADMIN — POTASSIUM CHLORIDE 20 MEQ: 1.5 FOR SOLUTION ORAL at 08:08

## 2021-01-01 RX ADMIN — GLYCOPYRROLATE 0.5 MG: 1 TABLET ORAL at 21:38

## 2021-01-01 RX ADMIN — SODIUM PHOSPHATE, DIBASIC AND SODIUM PHOSPHATE, MONOBASIC 66 ML: 3.5; 9.5 ENEMA RECTAL at 13:50

## 2021-01-01 RX ADMIN — POTASSIUM CHLORIDE 20 MEQ: 1.5 FOR SOLUTION ORAL at 18:35

## 2021-01-01 RX ADMIN — DEXTROSE MONOHYDRATE 75 ML/HR: 50 INJECTION, SOLUTION INTRAVENOUS at 17:38

## 2021-01-01 RX ADMIN — Medication 10 ML: at 14:28

## 2021-01-01 RX ADMIN — POTASSIUM CHLORIDE 20 MEQ: 1.5 FOR SOLUTION ORAL at 15:07

## 2021-01-01 RX ADMIN — GLYCOPYRROLATE 0.1 MG: 0.2 INJECTION, SOLUTION INTRAMUSCULAR; INTRAVENOUS at 10:07

## 2021-01-01 RX ADMIN — IPRATROPIUM BROMIDE AND ALBUTEROL SULFATE 3 ML: .5; 2.5 SOLUTION RESPIRATORY (INHALATION) at 19:12

## 2021-01-01 RX ADMIN — DEXTROSE MONOHYDRATE 25 G: 25 INJECTION, SOLUTION INTRAVENOUS at 08:54

## 2021-01-01 RX ADMIN — DEXTROSE MONOHYDRATE 75 ML/HR: 50 INJECTION, SOLUTION INTRAVENOUS at 07:33

## 2021-01-01 RX ADMIN — PIPERACILLIN AND TAZOBACTAM 3.38 G: 3; .375 INJECTION, POWDER, LYOPHILIZED, FOR SOLUTION INTRAVENOUS at 01:23

## 2021-01-01 RX ADMIN — WHITE PETROLATUM,ZINC OXIDE: 57; 17 PASTE TOPICAL at 17:06

## 2021-01-01 RX ADMIN — GLYCOPYRROLATE 0.5 MG: 1 TABLET ORAL at 22:24

## 2021-01-01 RX ADMIN — WHITE PETROLATUM,ZINC OXIDE: 57; 17 PASTE TOPICAL at 15:15

## 2021-01-01 RX ADMIN — PIPERACILLIN AND TAZOBACTAM 3.38 G: 3; .375 INJECTION, POWDER, LYOPHILIZED, FOR SOLUTION INTRAVENOUS at 07:00

## 2021-01-01 RX ADMIN — METOCLOPRAMIDE HYDROCHLORIDE 5 MG: 5 SOLUTION ORAL at 18:39

## 2021-01-01 RX ADMIN — SODIUM CHLORIDE, POTASSIUM CHLORIDE, SODIUM LACTATE AND CALCIUM CHLORIDE: 600; 310; 30; 20 INJECTION, SOLUTION INTRAVENOUS at 15:48

## 2021-01-01 RX ADMIN — POTASSIUM CHLORIDE 20 MEQ: 1.5 FOR SOLUTION ORAL at 10:40

## 2021-01-01 RX ADMIN — DOCUSATE SODIUM 100 MG: 50 LIQUID ORAL at 21:38

## 2021-01-01 RX ADMIN — WHITE PETROLATUM,ZINC OXIDE: 57; 17 PASTE TOPICAL at 12:26

## 2021-01-01 RX ADMIN — EPINEPHRINE 1 MG: 0.1 INJECTION, SOLUTION ENDOTRACHEAL; INTRACARDIAC; INTRAVENOUS at 22:50

## 2021-01-01 RX ADMIN — Medication 10 ML: at 05:51

## 2021-01-01 RX ADMIN — SORBITOL SOLUTION (BULK) 30 ML: 70 SOLUTION at 12:14

## 2021-01-01 RX ADMIN — PROPOFOL 30 MCG/KG/MIN: 10 INJECTION, EMULSION INTRAVENOUS at 18:28

## 2021-01-01 RX ADMIN — Medication 10 ML: at 14:42

## 2021-01-01 RX ADMIN — DOCUSATE SODIUM 100 MG: 50 LIQUID ORAL at 21:04

## 2021-01-01 RX ADMIN — WHITE PETROLATUM,ZINC OXIDE: 57; 17 PASTE TOPICAL at 18:16

## 2021-01-01 RX ADMIN — IPRATROPIUM BROMIDE AND ALBUTEROL SULFATE 3 ML: .5; 2.5 SOLUTION RESPIRATORY (INHALATION) at 20:18

## 2021-01-01 RX ADMIN — DOCUSATE SODIUM 100 MG: 50 LIQUID ORAL at 15:07

## 2021-01-01 RX ADMIN — PIPERACILLIN AND TAZOBACTAM 3.38 G: 3; .375 INJECTION, POWDER, LYOPHILIZED, FOR SOLUTION INTRAVENOUS at 23:35

## 2021-01-01 RX ADMIN — IPRATROPIUM BROMIDE AND ALBUTEROL SULFATE 3 ML: .5; 2.5 SOLUTION RESPIRATORY (INHALATION) at 08:07

## 2021-01-01 RX ADMIN — IPRATROPIUM BROMIDE AND ALBUTEROL SULFATE 3 ML: .5; 2.5 SOLUTION RESPIRATORY (INHALATION) at 20:36

## 2021-01-01 RX ADMIN — DOCUSATE SODIUM 100 MG: 50 LIQUID ORAL at 08:45

## 2021-01-01 RX ADMIN — SODIUM CHLORIDE 1000 ML: 9 INJECTION, SOLUTION INTRAVENOUS at 17:23

## 2021-01-01 RX ADMIN — IPRATROPIUM BROMIDE AND ALBUTEROL SULFATE 3 ML: .5; 2.5 SOLUTION RESPIRATORY (INHALATION) at 19:59

## 2021-01-01 RX ADMIN — POTASSIUM CHLORIDE 20 MEQ: 1.5 FOR SOLUTION ORAL at 16:38

## 2021-01-01 RX ADMIN — AZITHROMYCIN 500 MG: 500 INJECTION, POWDER, LYOPHILIZED, FOR SOLUTION INTRAVENOUS at 19:40

## 2021-01-01 RX ADMIN — IPRATROPIUM BROMIDE AND ALBUTEROL SULFATE 3 ML: .5; 2.5 SOLUTION RESPIRATORY (INHALATION) at 14:35

## 2021-01-01 RX ADMIN — POTASSIUM CHLORIDE 20 MEQ: 1.5 FOR SOLUTION ORAL at 17:00

## 2021-01-01 RX ADMIN — Medication 10 ML: at 00:13

## 2021-01-01 RX ADMIN — PROPOFOL 30 MG: 10 INJECTION, EMULSION INTRAVENOUS at 16:10

## 2021-01-01 RX ADMIN — ENOXAPARIN SODIUM 40 MG: 40 INJECTION SUBCUTANEOUS at 10:38

## 2021-01-01 RX ADMIN — PIPERACILLIN AND TAZOBACTAM 3.38 G: 3; .375 INJECTION, POWDER, LYOPHILIZED, FOR SOLUTION INTRAVENOUS at 23:59

## 2021-01-01 RX ADMIN — PIPERACILLIN AND TAZOBACTAM 3.38 G: 3; .375 INJECTION, POWDER, LYOPHILIZED, FOR SOLUTION INTRAVENOUS at 00:11

## 2021-01-01 RX ADMIN — DOCUSATE SODIUM 100 MG: 50 LIQUID ORAL at 21:17

## 2021-01-01 RX ADMIN — SORBITOL SOLUTION (BULK) 30 ML: 70 SOLUTION at 10:59

## 2021-01-01 RX ADMIN — IPRATROPIUM BROMIDE AND ALBUTEROL SULFATE 3 ML: .5; 2.5 SOLUTION RESPIRATORY (INHALATION) at 06:35

## 2021-01-01 RX ADMIN — DOCUSATE SODIUM 100 MG: 50 LIQUID ORAL at 21:24

## 2021-01-01 RX ADMIN — PIPERACILLIN AND TAZOBACTAM 3.38 G: 3; .375 INJECTION, POWDER, LYOPHILIZED, FOR SOLUTION INTRAVENOUS at 11:56

## 2021-01-01 RX ADMIN — SENNOSIDES 17.2 MG: 8.6 TABLET, FILM COATED ORAL at 11:31

## 2021-01-01 RX ADMIN — METOCLOPRAMIDE HYDROCHLORIDE 5 MG: 5 SOLUTION ORAL at 18:35

## 2021-01-01 RX ADMIN — Medication 10 ML: at 07:16

## 2021-01-01 RX ADMIN — IPRATROPIUM BROMIDE AND ALBUTEROL SULFATE 3 ML: .5; 2.5 SOLUTION RESPIRATORY (INHALATION) at 09:44

## 2021-01-01 RX ADMIN — POTASSIUM CHLORIDE 20 MEQ: 1.5 FOR SOLUTION ORAL at 08:45

## 2021-01-01 RX ADMIN — PIPERACILLIN AND TAZOBACTAM 3.38 G: 3; .375 INJECTION, POWDER, LYOPHILIZED, FOR SOLUTION INTRAVENOUS at 15:54

## 2021-10-17 PROBLEM — R62.7 FAILURE TO THRIVE IN ADULT: Status: ACTIVE | Noted: 2021-01-01

## 2021-10-17 PROBLEM — J18.9 MULTIFOCAL PNEUMONIA: Status: ACTIVE | Noted: 2021-01-01

## 2021-10-17 PROBLEM — J96.00 ACUTE RESPIRATORY FAILURE (HCC): Status: ACTIVE | Noted: 2021-01-01

## 2021-10-17 NOTE — ED TRIAGE NOTES
SOB, PT FROM HOME, POSSIBLY HAS AIRWAY OBSTRUCTION, PT GARGLING ON ED ARRIVAL, MUSCLE CEREBELLA ATOXIA

## 2021-10-17 NOTE — H&P
GENERAL GENERIC H&P/CONSULT Subjective: 
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 Multiple family members have similar. Patient is brought to the emergency department after sudden onset of gurgling and respiratory distress witnessed by family. Patient is nonverbal, chronically ill looking, contracted and in respiratory distress. History obtained from a cousin. At arrival to the ER patient was desaturating in the 80s, she is placed on a nonrebreather improving her SPO2. She is tachycardic which is improving with IV resuscitation. Patient is afebrile, has no leukocytosis. CTA chest shows multifocal airspace opacities and a small cavitary lesion in the left lower lobe. Of note, aunt was a guardian who recently passed. Patient has a minor son and cousin sore next of kin. Spoke with [address] over the phone [phone]. Family confirms DNR status however they want patient to be intubated if she would to need it. Past Medical History:  
Diagnosis Date  Cerebellar ataxia ([address] No past surgical history on file. Prior to Admission medications Medication Sig Start Date End Date Taking? Authorizing Provider  
zolpidem (AMBIEN) 10 mg tablet  7/30/21   Other, MD Ling  
mirtazapine (REMERON) 15 mg tablet  7/30/21   Other, MD Ling  
 
No Known Allergies Social History Tobacco Use  Smoking status: Not on file Substance Use Topics  Alcohol use: Not on file No family history on file. Familiar cerebellar ataxia Review of Systems Unable to perform ROS: Patient nonverbal  
 
 
Objective: No intake/output data recorded. No intake/output data recorded. Patient Vitals for the past 8 hrs: 
 BP Temp Pulse Resp SpO2 Height Weight 10/17/21 1918 125/85  97  100 %    
10/17/21 1820 130/79 97.7 °F (36.5 °C) (!) 103 26 96 %    
10/17/21 1725 123/87  (!) 119 20 100 %    
10/17/21 1655 (!) 129/91 98.6 °F (37 °C) (!) 118 26 94 %    
10/17/21 1530     (!) 86 %    
10/17/21 1517 (!) 143/99 98.2 °F (36.8 °C) (!) 128 20 100 %    
10/17/21 1452 126/89  (!) 130 20 100 %    
10/17/21 1449 (!) 185/117   25 100 %    
10/17/21 1440   (!) 128   5' 6\" (1.676 m) 40.8 kg (90 lb) Physical Exam 
Constitutional:   
   General: She is in acute distress. Appearance: She is ill-appearing. Comments: Chronically sick looking. Cachectic, contracted, muscle wasting. Eyes:  
   Conjunctiva/sclera: Conjunctivae normal.  
   Pupils: Pupils are equal, round, and reactive to light. Cardiovascular:  
   Rate and Rhythm: Regular rhythm. Tachycardia present. Pulses: Normal pulses. Heart sounds: Normal heart sounds. No murmur heard. No friction rub. No gallop. Pulmonary:  
   Effort: Respiratory distress present. Breath sounds: Rhonchi and rales present. Abdominal:  
   General: Bowel sounds are normal.  
   Palpations: Abdomen is soft. Neurological:  
   Mental Status: She is alert. Mental status is at baseline. Cranial Nerves: No cranial nerve deficit. Labs:   
Recent Results (from the past 24 hour(s)) COVID-19 RAPID TEST Collection Time: 10/17/21  2:45 PM  
Result Value Ref Range Specimen source Please find results under separate order COVID-19 rapid test Not Detected Not Detected CBC WITH AUTOMATED DIFF Collection Time: 10/17/21  2:45 PM  
Result Value Ref Range WBC 10.2 3.6 - 11.0 K/uL  
 RBC 6.09 (H) 3.80 - 5.20 M/uL  
 HGB 16.9 (H) 11.5 - 16.0 g/dL HCT 57.7 (H) 35.0 - 47.0 % MCV 94.7 80.0 - 99.0 FL  
 MCH 27.8 26.0 - 34.0 PG  
 MCHC 29.3 (L) 30.0 - 36.5 g/dL  
 RDW 14.7 (H) 11.5 - 14.5 % PLATELET 443 457 - 361 K/uL MPV 11.7 8.9 - 12.9 FL  
 NRBC 0.0 0.0  WBC ABSOLUTE NRBC 0.00 0.00 - 0.01 K/uL NEUTROPHILS 80 (H) 32 - 75 % LYMPHOCYTES 12 12 - 49 % MONOCYTES 8 5 - 13 % EOSINOPHILS 0 0 - 7 % BASOPHILS 0 0 - 1 % IMMATURE GRANULOCYTES 0 %  
 ABS. NEUTROPHILS 8.2 (H) 1.8 - 8.0 K/UL  
 ABS. LYMPHOCYTES 1.2 0.8 - 3.5 K/UL  
 ABS. MONOCYTES 0.8 0.0 - 1.0 K/UL  
 ABS. EOSINOPHILS 0.0 0.0 - 0.4 K/UL  
 ABS. BASOPHILS 0.0 0.0 - 0.1 K/UL  
 ABS. IMM. GRANS. 0.0 K/UL  
 DF Smear Scanned RBC COMMENTS Normocytic, Normochromic LACTIC ACID Collection Time: 10/17/21  5:45 PM  
Result Value Ref Range Lactic acid 1.8 0.4 - 2.0 mmol/L  
URINALYSIS W/MICROSCOPIC Collection Time: 10/17/21  6:45 PM  
Result Value Ref Range Color Yellow/Straw Appearance Turbid (A) Clear Specific gravity 1.029 1.003 - 1.030    
 pH (UA) 5.0 5.0 - 8.0 Protein 30 (A) Negative mg/dL Glucose Negative Negative mg/dL Ketone 5 (A) Negative mg/dL Bilirubin Negative Negative Blood Moderate (A) Negative Urobilinogen 4.0 (H) 0.1 - 1.0 EU/dL Nitrites Negative Negative Leukocyte Esterase Negative Negative WBC 10-20 0 - 4 /hpf  
 RBC >100 (H) 0 - 5 /hpf Bacteria Negative Negative /hpf Mucus 4+ /lpf Granular cast 41 /lpf  
CBC WITH AUTOMATED DIFF Collection Time: 10/17/21  7:40 PM  
Result Value Ref Range WBC 4.3 3.6 - 11.0 K/uL  
 RBC 5.13 3.80 - 5.20 M/uL  
 HGB 14.2 11.5 - 16.0 g/dL HCT 48.9 (H) 35.0 - 47.0 % MCV 95.3 80.0 - 99.0 FL  
 MCH 27.7 26.0 - 34.0 PG  
 MCHC 29.0 (L) 30.0 - 36.5 g/dL  
 RDW 14.1 11.5 - 14.5 % PLATELET 056 463 - 599 K/uL MPV 10.5 8.9 - 12.9 FL  
 NRBC 0.0 0.0  WBC ABSOLUTE NRBC 0.00 0.00 - 0.01 K/uL NEUTROPHILS PENDING % LYMPHOCYTES PENDING % MONOCYTES PENDING % EOSINOPHILS PENDING % BASOPHILS PENDING % IMMATURE GRANULOCYTES PENDING %  
 ABS. NEUTROPHILS PENDING K/UL  
 ABS. LYMPHOCYTES PENDING K/UL  
 ABS. MONOCYTES PENDING K/UL  
 ABS. EOSINOPHILS PENDING K/UL  
 ABS. BASOPHILS PENDING K/UL  
 ABS. IMM. GRANS.  PENDING K/UL  
 DF PENDING Assessment: 
Active Problems: 
  Acute respiratory failure ([facility] [address] (10/17/2021) Failure to thrive in adult (10/17/2021) Multifocal pneumonia (10/17/2021) Acute hypoxic respiratory failure 
-Secondary to aspiration pneumonia/pneumonitis 
-On nonrebreather, de-escalate as tolerated -DNR but family request intubation if patient is to needed 
-Hold oral intake Multifocal pneumonia 
-Suspect acute on chronic aspiration pneumonia/pneumonitis. Cavitary lesion on the left lower lobe 
-Rapid Covid test is negative 
-For coverage with Zosyn 
-Respiratory culture if able to collect. Urine antigen 
-Supplemental oxygen 
-Pulmonary consult Hypernatremia 
-Due to dehydration in the setting of decreased oral intake 
-Free water deficit of 2.7L 
-IV crystalloid resuscitation 
-Monitor with BMP. Avoid rapid correction Hypokalemia 
-IV KCl 
-Follow-up BMP Familial spinocerebellar ataxia 
-With severe physical and mental debility 
-Supportive care Failure to thrive 
-Cachexia, wasting, decreased/no oral intake, BMI is 14 
-Clinical nutrition consult 
-Family requests for feeding tube placement. Encourage palliative care evaluation if services available 
-GI consult DVT prophylaxis: Lovenox DNR, family requests for intubation if patient is to needed Signed: 
Luis Felipe Aguillon MD 10/17/2021

## 2021-10-18 PROBLEM — E43 SEVERE PROTEIN-CALORIE MALNUTRITION (HCC): Status: ACTIVE | Noted: 2021-01-01

## 2021-10-18 NOTE — PROGRESS NOTES
Consult received, chart reviewed and evaluation attempted. Pt with hereditary spinocerebellar ataxia, cachectic presentation, is nonverbal and limited eye opening is present. CTA chest results reviewed. Concerns for chronic aspiration are present. Pt's cousin Alisa Prabhakar (unsure of spelling) arrives during evaluation attempt and reports that the pt's typical caregiver is Audie's mother (pt's aunt), who passed away previously this month. Ilsa Bradford reports that she is currently caring for pt. Pt with audible and palpable laryngeal congestion, sensitive gag with attempts to suction oral cavity. Pt able to weakly cough on command. With trial of MIN ice chip, there is no oral phase initiation, which is a decline from baseline per pt's cousin. Oral cavity suctioned by clinician. At this time, due to significant concerns for aspiration, malnutrition and decline in sw function, recommend STRICT NPO with alternate source of nutrition as per MD/RD recs. Will defer to MD re: goals of care and appropriate alternate source of nutrition. Will cont to follow.

## 2021-10-18 NOTE — CONSULTS
Consult  Pulmonary, Critical Care    Name: Minoo Avery MRN: [id number]   : 1985 Hospital: [address] [place] [address]   Date: 10/18/2021  Admission date: 10/17/2021 Hospital Day: 2       Subjective/Interval History:   Patient seen on the medical floor. Has best I can understand she is bedbound nonverbal but does eat orally. In any event she has had gurgling respirations were brought to the emergency room has bilateral pneumonia right base greater than left base with a cavitary area in the left lower lung pleural base. She is normally followed at [place] [id number]  Never Reviewed        Codes Class Noted POA    Severe protein-calorie malnutrition ([facility] [address] ICD-10-CM: E43  ICD-9-CM: 789  10/18/2021 Unknown        Acute respiratory failure (H[facility]) ICD-10-CM: J96.00  ICD-9-CM: 518.81  10/17/2021 Unknown        Failure to thrive in adult ICD-10-CM: R62.7  ICD-9-CM: 783.7  10/17/2021 Unknown        Multifocal pneumonia ICD-10-CM: J18.9  ICD-9-CM: 486  10/17/2021 Unknown              IMPRESSION:   1. Acute hypoxic respiratory failure  2. Aspiration pneumonia  3. Pharyngeal dysphagia  4. Possible gastroparesis with reflux  5. Altered mental status  6. Spinocerebellar ataxia  7. Severe hypernatremia  8. Severe hypo-Evangelina Leah  9. Severe malnutrition  10. Body mass index is 11.71 kg/m². 11.       RECOMMENDATIONS/PLAN:   1. Continue oxygen supplementation to maintain oxygen saturation greater than 90%  2. Maintain n.p.o. status. 3. Would ask speech to evaluate  4. I think she needs a PEG tube  5. Agree IV D5W to correct hypernatremia  6. Replace potassium  7. [x] High complexity decision making was performed  [x] See my orders for details      Subjective/Initial History:     I was asked by Camryn Wood MD to see Minoo Avery  a 39 y.o.    female in consultation for a chief complaint of acute hypoxic respiratory failure aspiration pneumonia with cavitation        No Known Allergies     MAR reviewed and pertinent medications noted or modified as needed     Current Facility-Administered Medications   Medication    dextrose 5% infusion    vancomycin (VANCOCIN) 750 mg in 0.9% sodium chloride 250 mL (VIAL-MATE)    Vancomycin - Pharmacy to Dose    [START ON 10/19/2021] Vancomycin - Random level to be drawn 10/19 @ 0400    potassium chloride (KLOR-CON) packet for solution 20 mEq    potassium chloride 10 mEq in 100 ml IVPB    [Held by provider] mirtazapine (REMERON) tablet 15 mg    [Held by provider] zolpidem (AMBIEN) tablet 5 mg    sodium chloride (NS) flush 5-40 mL    sodium chloride (NS) flush 5-40 mL    acetaminophen (TYLENOL) tablet 650 mg    Or    acetaminophen (TYLENOL) suppository 650 mg    polyethylene glycol (MIRALAX) packet 17 g    ondansetron (ZOFRAN ODT) tablet 4 mg    Or    ondansetron (ZOFRAN) injection 4 mg    enoxaparin (LOVENOX) injection 40 mg    piperacillin-tazobactam (ZOSYN) 3.375 g in 0.9% sodium chloride (MBP/ADV) 100 mL MBP      Patient PCP: Unknown, Provider, MD  PMH:  has a past medical history of Cerebellar ataxia ([address] PSH:   has a past surgical history that includes pr breast surgery procedure unlisted. FHX: family history is not on file. SHX:  reports that she has never smoked. She has never used smokeless tobacco. She reports that she does not drink alcohol and does not use drugs.   Systemic review unobtainable as the patient is nonverbal      Objective:     Vital Signs: Telemetry:    normal sinus rhythm Intake/Output:   Visit Vitals  /74 (BP Patient Position: At rest;Lying)   Pulse 83   Temp 97.6 °F (36.4 °C)   Resp 18   Ht 5' 6\" (1.676 m)   Wt 32.9 kg (72 lb 8.5 oz)   LMP  (LMP Unknown)   SpO2 99%   BMI 11.71 kg/m²       Temp (24hrs), Av.2 °F (36.8 °C), Min:97.6 °F (36.4 °C), Max:99.7 °F (37.6 °C)        O2 Device: Nasal cannula O2 Flow Rate (L/min): 2 l/min       Wt Readings from Last 4 Encounters:   10/18/21 32.9 kg (72 lb 8.5 oz)        No intake or output data in the 24 hours ending 10/18/21 1420    Last shift:      No intake/output data recorded. Last 3 shifts: No intake/output data recorded. Physical Exam:   General:  female; in bed profound malnutrition with upper airway noise  HEENT: NCAT, poor dentition, lips and mucosa dry  Eyes: anicteric; conjunctiva clear random eye movement present  Neck: no nodes, no JVD, no accessory MM use  Chest: no deformity,   Cardiac: Rapid regular rhythm  Lungs: Gurgling upper airway noise over the neck transmitted to the lungs but I believe she also has rales and rhonchi both lateral and lower areas  Abd: Thin emaciated bowel sounds present  Ext: no edema; no joint swelling; No clubbing  : clear urine  Neuro: Eyes are open does not look at voice has some random eye movement contracted extremities  Psych-nonverbal unable to assess  Skin: warm, dry, no cyanosis;   Pulses: Brachial radial femoral pulses intact  Capillary: Normal capillary refill    Labs:    Recent Labs     10/18/21  0303 10/17/21  1940 10/17/21  1445   WBC 9.6 4.3 10.2   HGB 14.6 14.2 16.9*    172 232     Recent Labs     10/18/21  0303 10/17/21  1940 10/17/21  1745   * 164*  164*  --    K 2.6* 2.4*  2.4*  --    * 126*  126*  --    CO2 26 23  23  --    * 93  93  --    BUN 35* 36*  36*  --    CREA 0.36* 0.25*  0.25*  --    CA 8.7 7.2*  7.2*  --    MG 2.3  --   --    LAC 2.2* 3.2*  3.2* 1.8   ALB  --  1.7*  --    ALT  --  13  --      COVID-19 antigen negative  Procalcitonin 2.21  Lab Results   Component Value Date/Time    Culture result: No growth after 2 hours 10/17/2021 05:30 PM     Imaging:    CXR Results  (Last 48 hours)               10/17/21 1512  XR CHEST PORT Final result    Impression:  1. Large right perihilar opacity, mass versus airspace disease. Further   characterization with CT is recommended. 2. Nodular opacity in the left midlung zone.  Further characterization with CT is   recommended. Narrative:  AP portable chest, 1500 hours. Comparison: None. Findings: Multiple cardiac monitoring leads overlie the chest. The heart is   normal in size. There is an 8.4 cm right perihilar opacity. There is a 3.7 cm   opacity in the left midlung zone. There is no pulmonary vascular congestion. No   pleural effusion or pneumothorax is identified. The osseous structures are   unremarkable. Results from [place] encounter on 10/17/21    XR NECK SOFT TISSUE    Narrative  Cervical soft tissues, 2 views. Comparison: None. Findings: The airway is grossly patent. The epiglottis is normal in size. No  radiodense foreign body is identified. There is normal alignment of the cervical  spine. Impression  No significant soft tissue swelling or radiodense foreign body. XR CHEST PORT    Narrative  AP portable chest, 1500 hours. Comparison: None. Findings: Multiple cardiac monitoring leads overlie the chest. The heart is  normal in size. There is an 8.4 cm right perihilar opacity. There is a 3.7 cm  opacity in the left midlung zone. There is no pulmonary vascular congestion. No  pleural effusion or pneumothorax is identified. The osseous structures are  unremarkable. Impression  1. Large right perihilar opacity, mass versus airspace disease. Further  characterization with CT is recommended. 2. Nodular opacity in the left midlung zone. Further characterization with CT is  recommended. Results from [place] encounter on 10/17/21    CTA CHEST W OR W WO CONT    Narrative  Chest CTA    TECHNIQUE: Multiple continuous axial images were obtained from the thoracic  inlet to the upper abdomen after the uneventful administration of intravenous  contrast. Reformatted images as well as maximum intensity projection images were  obtained in the sagittal and coronal planes.       Comment on dose reduction: All CT scans at this facility are performed using  dose reduction optimization technique as appropriate to perform the exam  including the following; automated exposure control, adjustments of the mA  and/or kV according to patient size, or use of iterative reconstructed  technique. Comparison examination: Chest radiograph dated October 17, 2021    Findings:  LYMPHADENOPATHY: Mediastinal lymph nodes top normal in size, likely reactive to  the process in the lungs described below. CARDIOVASCULAR: Negative for pulmonary embolus. Heart normal in size. Thoracic  aorta normal in caliber, negative for dissection. Great vessels patent. LUNGS AND PLEURA: Tree-in-bud centrilobular micronodules diffusely throughout  the lungs with subpleural consolidation present right lower lobe and left  posterior basilar segment. Cavitary lesion with air-fluid level present left  lower lobe measuring approximately 21 mm, cavitary lesion with air-fluid level  medial segment middle lobe measuring 21 mm. INCLUDED ABDOMEN: The visualized upper abdomen images normally. CHEST WALL: No suspicious lytic or blastic lesion is identified. Impression  Negative for pulmonary embolus. Findings the lungs consistent with multilobar pneumonia with concomitant  bronchiolitis. The cavitary foci may represent pneumatoceles or potentially  septic emboli, echocardiogram recommended when clinically able. Percussion patient has gurgling respirations has pharyngeal dysphagia is chronically aspirating. Initial x-ray was suspicious for gastric distention as well very likely has gastroparesis with reflux and aspiration on that. Chest x-ray and CT show bibasilar infiltrates right greater than left consistent with aspiration and there is a cavitary area in the left lung which suggest this is a chronic process. She is on Zosyn I agree with that. Agree n.p.o. status with feeding tube.   She likely needs a PEG tube    Kelsi Cruz MD

## 2021-10-18 NOTE — PROGRESS NOTES
Day #1 of Vancomycin Consult provided for this 39 y.o. female for indication of aspiration pneumonia. Abx regimen:  Vancomycin + Zosyn Concomitant nephrotoxic drugs: Contrast given 10/17 Frequency of BMP: Not scheduled Recent Labs 10/18/21 
0303 10/17/21 
1940 10/17/21 
1445 WBC 9.6 4.3 10.2 CREA 0.36* 0.25*  0.25*  --   
BUN 35* 36*  36*  --   
 
Est CrCl: ~55-60 ml/min Temp (24hrs), Av.2 °F (36.8 °C), Min:97.6 °F (36.4 °C), Max:99.7 °F (37.6 °C) Cultures:  
10/17 Blood: pending 10/18 Urine: pending MRSA Swab: Ordered Target range: AUC/-600, trough 15-20 mcg/mL Impression/Plan: Afebrile, WBC wnl, lactic acidosis Patient has low body mass w/ severe malnutrition, suspect CrCl is artificially high Will initiate therapy with 750 mg x 1 and check a random level tomorrow AM to assess clearance before scheduling a maintenance dose Antimicrobial stop date TBD

## 2021-10-18 NOTE — CONSULTS
Comprehensive Nutrition Assessment    Type and Reason for Visit: Consult, Initial (Low BMI/TF recs)    Nutrition Recommendations/Plan:   Initiate Jevity 1.5 via NG at 10ml/hr, continuous  Advance by 10ml/hr q12h to goal of 35ml/hr  Flush with 145ml q4h or per MD     Order provides 1260kcal (95%), 54g pro (108%), and 1500ml (100%)    Start thiamin 476RJ and folic acid 1mg via IV    Replete lytes as needed  Obtain DAILY K, Mg, and Phos    Document TF formula, TF rate, flushes, and BM in I/O's    Nutrition Assessment:  Admitted for sudden onset of gurgling and respiratory distress witnessed by family. On 2L NC. Patient is nonverbal, chronically ill looking. Family requests for feeding tube placement, GI consulted. Not appropriate for PEG at this time. Family wanting NG tube, despite MD rec'd of palliative care. Major concern for refeeding syndrome. Spoke to RN and MD about starting TF slowly and slowly advancing, and starting IVF thiamin and folic acid- both agreeable. Labs: Hct 49.1, Na 161, K 2.6, BUN 35, Cr 0.36, . Meds: zosyn, KCl. Malnutrition Assessment:  Malnutrition Status:  Severe malnutrition    Context:  Chronic illness     Findings of the 6 clinical characteristics of malnutrition:   Energy Intake:  Unable to assess  Weight Loss:  Unable to assess     Body Fat Loss:  7 - Severe body fat loss, Triceps, Orbital, Fat overlying ribs, Buccal region   Muscle Mass Loss:  7 - Severe muscle mass loss, Calf (gastrocnemius), Clavicles (pectoralis &deltoids), Hand (interosseous), Scapula (trapezius), Thigh (quadriceps), Temples (temporalis)  Fluid Accumulation:  No significant fluid accumulation,      Estimated Daily Nutrient Needs:  Energy (kcal): 1320kcal (40kcal/kg); Weight Used for Energy Requirements: Current  Protein (g): 50g (1.5g/kg);  Weight Used for Protein Requirements: Current  Fluid (ml/day): 1500ml; Method Used for Fluid Requirements: Other (comment)    Nutrition Related Findings:  NFPE showing severe wasting throughout body. Extremely cachetic. No known N/V/D/C. SLP lilibeth pending, currently NPO. No BM since admit. No edema. Wounds:   None       Current Nutrition Therapies:  DIET NPO  ADULT TUBE FEEDING Nasogastric; Standard with Fiber; Delivery Method: Continuous; Continuous Initial Rate (mL/hr): 25; Continuous Advance Tube Feeding: Yes; Advancement Volume (mL/hr): 10; Advancement Frequency: Q 6 hours; Continuous Goal Rate (mL. .. Anthropometric Measures:  · Height:  5' 6\" (167.6 cm)  · Current Body Wt:  32.9 kg (72 lb 8.5 oz)   · Ideal Body Wt:  130 lbs:  55.8 %   · BMI Category:  Underweight (BMI less than 22) age over 72       Nutrition Diagnosis:   · Inadequate oral intake related to cognitive or neurological impairment as evidenced by BMI, severe muscle loss, severe loss of subcutaneous fat    Nutrition Interventions:   Food and/or Nutrient Delivery: Start tube feeding  Nutrition Education and Counseling: Education not indicated  Coordination of Nutrition Care: Continue to monitor while inpatient    Goals:  Pt to meet >75% of EEN via NGT within 3 days. Wt gain +0.5kg/week. Lytes WNL. Nutrition Monitoring and Evaluation:   Behavioral-Environmental Outcomes: None identified  Food/Nutrient Intake Outcomes: Vitamin/mineral intake, Enteral nutrition intake/tolerance  Physical Signs/Symptoms Outcomes: Biochemical data, Weight, Nutrition focused physical findings    Discharge Planning:     Too soon to determine     Electronically signed by Ezio Doyle RD on 10/18/2021 at 10:20 AM    Contact: [phone]

## 2021-10-18 NOTE — ED NOTES
TRANSFER - OUT REPORT: 
 
Verbal report given to [employer] (name) on Obinna Anderson  being transferred to (unit) for routine progression of care Report consisted of patients Situation, Background, Assessment and  
Recommendations(SBAR). Information from the following report(s) SBAR and ED Summary was reviewed with the receiving nurse. Lines:  
Peripheral IV 10/17/21 Posterior;Right Wrist (Active) Peripheral IV 10/17/21 Anterior; Left Hand (Active) Peripheral IV 10/17/21 Anterior;Distal;Left Forearm (Active) Peripheral IV 10/17/21 Anterior; Left Hand (Active) Opportunity for questions and clarification was provided. Patient transported with: 
 Monitor O2 @ 5 liters Tech

## 2021-10-18 NOTE — PROGRESS NOTES
Problem: Falls - Risk of  Goal: *Absence of Falls  Description: Document Leafy Camacho Fall Risk and appropriate interventions in the flowsheet. Outcome: Progressing Towards Goal  Note: Fall Risk Interventions:                 Elimination Interventions: Bed/chair exit alarm, Call light in reach, Toileting schedule/hourly rounds              Problem: Patient Education: Go to Patient Education Activity  Goal: Patient/Family Education  Outcome: Progressing Towards Goal     Problem: Pressure Injury - Risk of  Goal: *Prevention of pressure injury  Description: Document Catarino Scale and appropriate interventions in the flowsheet. Outcome: Progressing Towards Goal  Note: Pressure Injury Interventions:  Sensory Interventions: Assess need for specialty bed, Discuss PT/OT consult with provider, Float heels, Keep linens dry and wrinkle-free, Maintain/enhance activity level, Minimize linen layers, Turn and reposition approx. every two hours (pillows and wedges if needed)    Moisture Interventions: Absorbent underpads, Apply protective barrier, creams and emollients, Internal/External urinary devices, Check for incontinence Q2 hours and as needed    Activity Interventions: Assess need for specialty bed, Increase time out of bed, PT/OT evaluation    Mobility Interventions: Assess need for specialty bed, Float heels, HOB 30 degrees or less, PT/OT evaluation, Turn and reposition approx.  every two hours(pillow and wedges)    Nutrition Interventions: Document food/fluid/supplement intake, Offer support with meals,snacks and hydration, Discuss nutritional consult with provider    Friction and Shear Interventions: Apply protective barrier, creams and emollients, Feet elevated on foot rest, HOB 30 degrees or less                Problem: Patient Education: Go to Patient Education Activity  Goal: Patient/Family Education  Outcome: Progressing Towards Goal

## 2021-10-18 NOTE — PROGRESS NOTES
Pt family would like to have speech consult completed prior to NG tube placement. RN will respect the family's request at this time. Message left with the therapy office requesting the consult be completed soon. Provider notified of family's request and medications on hold until NG in place.

## 2021-10-18 NOTE — PROGRESS NOTES
Patient was noted to have increased work of breathing with use of accessory muscles, audible gurgling, and tachypnea. Vitals assessed and patient was hypoxic. O2 increased and oral suctioning performed with no improvement. House supervisor at bedside, rapid response initiated.

## 2021-10-18 NOTE — PROGRESS NOTES
Hospitalist Progress Note Daily Progress Note: 10/18/2021 Subjective:  
Patient is seen for follow-up. 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 Patient was noted to be nonverbal, chronically ill looking, contracted and in respiratory distress. Initially oxygen saturations were in the 80s. She was placed on a nonrebreather. CTA of the chest showed multi focal airspace disease and a small cavitary lesion in the left lower lobe. Patient was admitted and started on Zosyn. Family requested DNR CODE STATUS although did agree to intubation if needed Labs showed a sodium of 164 Today patient is resting comfortably, unresponsive on nasal cannula Problem List: 
Problem List as of 10/18/2021 Never Reviewed Codes Class Noted - Resolved Acute respiratory failure ([facility] [address] ICD-10-CM: J96.00 
ICD-9-CM: 518.81  10/17/2021 - Present Failure to thrive in adult ICD-10-CM: R62.7 ICD-9-CM: 783.7  10/17/2021 - Present Multifocal pneumonia ICD-10-CM: J18.9 ICD-9-CM: 189  10/17/2021 - Present Medications reviewed Current Facility-Administered Medications Medication Dose Route Frequency  [Held by provider] mirtazapine (REMERON) tablet 15 mg  15 mg Oral QHS  [Held by provider] zolpidem (AMBIEN) tablet 5 mg  5 mg Oral QHS  sodium chloride (NS) flush 5-40 mL  5-40 mL IntraVENous Q8H  
 sodium chloride (NS) flush 5-40 mL  5-40 mL IntraVENous PRN  
 acetaminophen (TYLENOL) tablet 650 mg  650 mg Oral Q6H PRN Or  
 acetaminophen (TYLENOL) suppository 650 mg  650 mg Rectal Q6H PRN  polyethylene glycol (MIRALAX) packet 17 g  17 g Oral DAILY PRN  
 ondansetron (ZOFRAN ODT) tablet 4 mg  4 mg Oral Q8H PRN  Or  
 ondansetron (ZOFRAN) injection 4 mg  4 mg IntraVENous Q6H PRN  
 enoxaparin (LOVENOX) injection 40 mg 40 mg SubCUTAneous DAILY  0.9% sodium chloride infusion  100 mL/hr IntraVENous CONTINUOUS  piperacillin-tazobactam (ZOSYN) 3.375 g in 0.9% sodium chloride (MBP/ADV) 100 mL MBP  3.375 g IntraVENous Q8H Review of Systems:  
Review of systems not obtained due to patient factors. Objective:  
Physical Exam:  
 
Visit Vitals /65 (BP 1 Location: Right leg, BP Patient Position: At rest;Lying) Pulse 92 Temp 97.6 °F (36.4 °C) Resp 18 Ht 5' 6\" (1.676 m) Wt 40.8 kg (90 lb) LMP  (LMP Unknown) SpO2 98% BMI 14.53 kg/m² O2 Flow Rate (L/min): 2 l/min O2 Device: Nasal cannula Temp (24hrs), Av.3 °F (36.8 °C), Min:97.6 °F (36.4 °C), Max:99.7 °F (37.6 °C) No intake/output data recorded. No intake/output data recorded. General:   Unresponsive, cachectic and emaciated Lungs:    Rhonchi bilateral  
Chest wall:  No tenderness or deformity. Heart:  Regular rate and rhythm, S1, S2 normal, no murmur, click, rub or gallop. Abdomen:   Soft, non-tender. Bowel sounds normal. No masses,  No organomegaly. Extremities:  Contracted Pulses: 2+ and symmetric all extremities. Skin: Skin color, texture, turgor normal. No rashes or lesions Neurologic: CNII-XII intact. Contractures of upper and lower extremities Data Review:  
   
Recent Days: 
Recent Labs 10/18/21 
0303 10/17/21 
1940 10/17/21 
1445 WBC 9.6 4.3 10.2 HGB 14.6 14.2 16.9*  
HCT 49.1* 48.9* 57.7*  
 172 232 Recent Labs 10/18/21 
0303 10/17/21 
1940 * 164*  164* K 2.6* 2.4*  2.4*  
* 126*  126* CO2 26 23  23 * 93  93 BUN 35* 36*  36* CREA 0.36* 0.25*  0.25* CA 8.7 7.2*  7.2*  
MG 2.3  --   
ALB  --  1.7* TBILI  --  0.7 ALT  --  13 No results for input(s): PH, PCO2, PO2, HCO3, FIO2 in the last 72 hours. 24 Hour Results: 
Recent Results (from the past 24 hour(s)) COVID-19 RAPID TEST  Collection Time: 10/17/21  2:45 PM  
Result Value Ref Range Specimen source Please find results under separate order COVID-19 rapid test Not Detected Not Detected CBC WITH AUTOMATED DIFF Collection Time: 10/17/21  2:45 PM  
Result Value Ref Range WBC 10.2 3.6 - 11.0 K/uL  
 RBC 6.09 (H) 3.80 - 5.20 M/uL  
 HGB 16.9 (H) 11.5 - 16.0 g/dL HCT 57.7 (H) 35.0 - 47.0 % MCV 94.7 80.0 - 99.0 FL  
 MCH 27.8 26.0 - 34.0 PG  
 MCHC 29.3 (L) 30.0 - 36.5 g/dL  
 RDW 14.7 (H) 11.5 - 14.5 % PLATELET 000 200 - 753 K/uL MPV 11.7 8.9 - 12.9 FL  
 NRBC 0.0 0.0  WBC ABSOLUTE NRBC 0.00 0.00 - 0.01 K/uL NEUTROPHILS 80 (H) 32 - 75 % LYMPHOCYTES 12 12 - 49 % MONOCYTES 8 5 - 13 % EOSINOPHILS 0 0 - 7 % BASOPHILS 0 0 - 1 % IMMATURE GRANULOCYTES 0 %  
 ABS. NEUTROPHILS 8.2 (H) 1.8 - 8.0 K/UL  
 ABS. LYMPHOCYTES 1.2 0.8 - 3.5 K/UL  
 ABS. MONOCYTES 0.8 0.0 - 1.0 K/UL  
 ABS. EOSINOPHILS 0.0 0.0 - 0.4 K/UL  
 ABS. BASOPHILS 0.0 0.0 - 0.1 K/UL  
 ABS. IMM. GRANS. 0.0 K/UL  
 DF Smear Scanned RBC COMMENTS Normocytic, Normochromic EKG, 12 LEAD, INITIAL Collection Time: 10/17/21  4:15 PM  
Result Value Ref Range Ventricular Rate 138 BPM  
 Atrial Rate 138 BPM  
 P-R Interval 126 ms  
 QRS Duration 76 ms  
 Q-T Interval 288 ms QTC Calculation (Bezet) 436 ms Calculated P Axis 69 degrees Calculated R Axis 57 degrees Calculated T Axis 66 degrees Diagnosis Sinus tachycardia Nonspecific ST and T wave abnormality Abnormal ECG When compared with ECG of 17-OCT-2021 15:26, (Unconfirmed) Previous ECG has undetermined rhythm, needs review Questionable change in QRS duration Confirmed by [facility], [address] ([phone]) on 10/18/2021 7:11:14 AM 
  
LACTIC ACID Collection Time: 10/17/21  5:45 PM  
Result Value Ref Range Lactic acid 1.8 0.4 - 2.0 mmol/L  
URINALYSIS W/MICROSCOPIC Collection Time: 10/17/21  6:45 PM  
Result Value Ref Range Color Yellow/Straw Appearance Turbid (A) Clear  Specific gravity 1.029 1.003 - 1.030    
 pH (UA) 5.0 5.0 - 8.0 Protein 30 (A) Negative mg/dL Glucose Negative Negative mg/dL Ketone 5 (A) Negative mg/dL Bilirubin Negative Negative Blood Moderate (A) Negative Urobilinogen 4.0 (H) 0.1 - 1.0 EU/dL Nitrites Negative Negative Leukocyte Esterase Negative Negative WBC 10-20 0 - 4 /hpf  
 RBC >100 (H) 0 - 5 /hpf Bacteria Negative Negative /hpf Mucus 4+ /lpf Granular cast 41 /lpf METABOLIC PANEL, BASIC Collection Time: 10/17/21  7:40 PM  
Result Value Ref Range Sodium 164 (HH) 136 - 145 mmol/L Potassium 2.4 (LL) 3.5 - 5.1 mmol/L Chloride 126 (H) 97 - 108 mmol/L  
 CO2 23 21 - 32 mmol/L Anion gap 15 5 - 15 mmol/L Glucose 93 65 - 100 mg/dL BUN 36 (H) 6 - 20 mg/dL Creatinine 0.25 (L) 0.55 - 1.02 mg/dL BUN/Creatinine ratio 144 (H) 12 - 20 GFR est AA >60 >60 ml/min/1.73m2 GFR est non-AA >60 >60 ml/min/1.73m2 Calcium 7.2 (L) 8.5 - 10.1 mg/dL TROPONIN-HIGH SENSITIVITY Collection Time: 10/17/21  7:40 PM  
Result Value Ref Range Troponin-High Sensitivity 160 (HH) 0 - 51 ng/L  
LACTIC ACID Collection Time: 10/17/21  7:40 PM  
Result Value Ref Range Lactic acid 3.2 (HH) 0.4 - 2.0 mmol/L  
CBC WITH AUTOMATED DIFF Collection Time: 10/17/21  7:40 PM  
Result Value Ref Range WBC 4.3 3.6 - 11.0 K/uL  
 RBC 5.13 3.80 - 5.20 M/uL  
 HGB 14.2 11.5 - 16.0 g/dL HCT 48.9 (H) 35.0 - 47.0 % MCV 95.3 80.0 - 99.0 FL  
 MCH 27.7 26.0 - 34.0 PG  
 MCHC 29.0 (L) 30.0 - 36.5 g/dL  
 RDW 14.1 11.5 - 14.5 % PLATELET 724 093 - 902 K/uL MPV 10.5 8.9 - 12.9 FL  
 NRBC 0.0 0.0  WBC ABSOLUTE NRBC 0.00 0.00 - 0.01 K/uL NEUTROPHILS 79 (H) 32 - 75 % LYMPHOCYTES 15 12 - 49 % MONOCYTES 6 5 - 13 % EOSINOPHILS 0 0 - 7 % BASOPHILS 0 0 - 1 % IMMATURE GRANULOCYTES 0 %  
 ABS. NEUTROPHILS 3.4 1.8 - 8.0 K/UL  
 ABS. LYMPHOCYTES 0.6 (L) 0.8 - 3.5 K/UL  
 ABS. MONOCYTES 0.3 0.0 - 1.0 K/UL  
 ABS. EOSINOPHILS 0.0 0.0 - 0.4 K/UL  
 ABS. BASOPHILS 0.0 0.0 - 0.1 K/UL  
 ABS. IMM. GRANS. 0.0 K/UL  
 DF Smear Scanned RBC COMMENTS Normocytic, Normochromic METABOLIC PANEL, COMPREHENSIVE Collection Time: 10/17/21  7:40 PM  
Result Value Ref Range Sodium 164 (HH) 136 - 145 mmol/L Potassium 2.4 (LL) 3.5 - 5.1 mmol/L Chloride 126 (H) 97 - 108 mmol/L  
 CO2 23 21 - 32 mmol/L Anion gap 15 5 - 15 mmol/L Glucose 93 65 - 100 mg/dL BUN 36 (H) 6 - 20 mg/dL Creatinine 0.25 (L) 0.55 - 1.02 mg/dL BUN/Creatinine ratio 144 (H) 12 - 20 GFR est AA >60 >60 ml/min/1.73m2 GFR est non-AA >60 >60 ml/min/1.73m2 Calcium 7.2 (L) 8.5 - 10.1 mg/dL Bilirubin, total 0.7 0.2 - 1.0 mg/dL AST (SGOT) 27 15 - 37 U/L  
 ALT (SGPT) 13 12 - 78 U/L Alk. phosphatase 70 45 - 117 U/L Protein, total 6.3 (L) 6.4 - 8.2 g/dL Albumin 1.7 (L) 3.5 - 5.0 g/dL Globulin 4.6 (H) 2.0 - 4.0 g/dL A-G Ratio 0.4 (L) 1.1 - 2.2 LACTIC ACID Collection Time: 10/17/21  7:40 PM  
Result Value Ref Range Lactic acid 3.2 (HH) 0.4 - 2.0 mmol/L MAGNESIUM Collection Time: 10/18/21  3:03 AM  
Result Value Ref Range Magnesium 2.3 1.6 - 2.4 mg/dL TROPONIN-HIGH SENSITIVITY Collection Time: 10/18/21  3:03 AM  
Result Value Ref Range Troponin-High Sensitivity 236 (HH) 0 - 51 ng/L  
CBC W/O DIFF Collection Time: 10/18/21  3:03 AM  
Result Value Ref Range WBC 9.6 3.6 - 11.0 K/uL  
 RBC 5.23 (H) 3.80 - 5.20 M/uL  
 HGB 14.6 11.5 - 16.0 g/dL HCT 49.1 (H) 35.0 - 47.0 % MCV 93.9 80.0 - 99.0 FL  
 MCH 27.9 26.0 - 34.0 PG  
 MCHC 29.7 (L) 30.0 - 36.5 g/dL  
 RDW 14.2 11.5 - 14.5 % PLATELET 546 950 - 593 K/uL MPV 10.0 8.9 - 12.9 FL  
 NRBC 0.2 (H) 0.0  WBC ABSOLUTE NRBC 0.02 (H) 0.00 - 0.01 K/uL METABOLIC PANEL, BASIC Collection Time: 10/18/21  3:03 AM  
Result Value Ref Range Sodium 161 (HH) 136 - 145 mmol/L Potassium 2.6 (LL) 3.5 - 5.1 mmol/L  Chloride 128 (H) 97 - 108 mmol/L  
 CO2 26 21 - 32 mmol/L Anion gap 7 5 - 15 mmol/L Glucose 112 (H) 65 - 100 mg/dL BUN 35 (H) 6 - 20 mg/dL Creatinine 0.36 (L) 0.55 - 1.02 mg/dL BUN/Creatinine ratio 97 (H) 12 - 20 GFR est AA >60 >60 ml/min/1.73m2 GFR est non-AA >60 >60 ml/min/1.73m2 Calcium 8.7 8.5 - 10.1 mg/dL LACTIC ACID Collection Time: 10/18/21  3:03 AM  
Result Value Ref Range Lactic acid 2.2 (HH) 0.4 - 2.0 mmol/L  
 
 
CTA CHEST W OR W WO CONT Final Result Negative for pulmonary embolus. Findings the lungs consistent with multilobar pneumonia with concomitant  
bronchiolitis. The cavitary foci may represent pneumatoceles or potentially  
septic emboli, echocardiogram recommended when clinically able. XR CHEST PORT Final Result 1. Large right perihilar opacity, mass versus airspace disease. Further  
characterization with CT is recommended. 2. Nodular opacity in the left midlung zone. Further characterization with CT is  
recommended. XR NECK SOFT TISSUE Final Result No significant soft tissue swelling or radiodense foreign body. Assessment: 
Multifocal bilateral pneumonia with cavitary lesion left lower lobe Acute respiratory failure with hypoxia Hypernatremia due to dehydration Sepsis with tachycardia and elevated lactate. Present on admission Hypokalemia Possible complicated UTI Hereditary spinocerebellar ataxia with chronic debilitation Adult failure to thrive Severe protein calorie malnutrition Plan: 
Continue Zosyn Add vancomycin for staph aureus coverage in light of cavitary lesion Change to hypotonic IV fluids Replete potassium Check urine culture Check baseline procalcitonin Prognosis appears guarded Care Plan discussed with: Nurse I updated her cousin Deshaun Schofield who is legal next of kin. Discussed her very poor condition, severe malnutrition and severe pneumonia.   Recommended she consider palliative care but she wants to continue being very aggressive. She is requesting a PEG tube as well, which patient will not be stable for unless her condition improves. For now, we will place an NG tube and start tube feeds Disposition: Continued inpatient care Total time spent with patient: 30 minutes.  
 
Enzo Tellez MD

## 2021-10-18 NOTE — PROGRESS NOTES
Reason for Admission:  Not eating at home. RUR Score:        9% Plan for utilizing home health:   Fortino Miller speaking for patient as she is nonverbal.    
 
PCP: First and Last name:  Dr. Melida KHAN Name of Practice:  
 Are you a current patient: Yes/No: yes Approximate date of last visit: 3 months ago Can you participate in a virtual visit with your PCP:  
                 
Current Advanced Directive/Advance Care Plan: Partial Code Healthcare Decision Maker:  
Click here to complete [place] including selection of the Healthcare Decision Maker Relationship (ie \"Primary\") Primary Decision Maker: Byron Ivy - Other Relative - [phone] Primary Decision Maker: Yessica Trevizo - Other Relative - [phone] Transition of Care Plan:                   
Patient is bedbound and lives in a home with several family members including her 15year old son, a 23year old cousin, 24year old cousin, her uncle. Patient was previously taken care of by her aunt and legal guardian in her aunt's home. Aiden Gonzalez passed away last month after short hopitalization for renal failure. Since her death, several family members help to take care of patient and she still lives in her aunt's house. Currently, her cousins Patt Barrett and Jonnathan Head are splitting responsibility of caring for her and making decisions for her. Patient currently has a hospital bed and a wheelchair at home. The aunt would previously take her to MD appointments in her [facility] which also held her wheelchair. Family is requesting an air mattress and suction machine at discharge.

## 2021-10-18 NOTE — ROUTINE PROCESS
Dual skin assessment done with Carolina Bailey RN. There is an open area on top of right great toe that is covered with gauze. The patient also has very thin skin over illiac crests that are slightly reddened but blanchable. Patient is contracted in bilateral arms, has foot drop in bilateral feet, and is cachectic with very thin skin over bony prominences.

## 2021-10-18 NOTE — ED PROVIDER NOTES
EMERGENCY DEPARTMENT HISTORY AND PHYSICAL EXAM 
 
 
Date: 10/17/2021 Patient Name: Miguelangel Guy History of Presenting Illness Chief Complaint Patient presents with  Shortness of Breath History Provided By: Caregiver HPI: Miguelangel Guy, 39 y.o. female PMH of spinocerebellar ataxia who presents in respiratory distress. Patient was noted to have gurgling suddenly and became very distressed. Called EMS and brought to the emerge department. In the emergency department patient was noted to be hypoxic and had upper airway secretions that were suctioned with improvement of her saturations. However, she was noted to be tachypneic with improvement from baseline. Per family, patient is DNR and DNI. Patient is nonverbal at baseline due to her progressive neurological disease. There are no other complaints, changes, or physical findings at this time. PCP: Unknown, Provider, MD 
 
Current Facility-Administered Medications Medication Dose Route Frequency Provider Last Rate Last Admin  dextrose 5% infusion  135 mL/hr IntraVENous CONTINUOUS Rey Zuñiga  mL/hr at 10/18/21 1414 135 mL/hr at 10/18/21 1414  Vancomycin - Pharmacy to Dose   Other Rx Dosing/Monitoring Rey Zuñiga MD      
 [START ON 10/19/2021] Vancomycin - Random level to be drawn 10/19 @ 0400   Other ONCE Rey Zuñiga MD      
 potassium chloride (KLOR-CON) packet for solution 20 mEq  20 mEq Per NG tube TID WITH MEALS Rey Zuñiga MD      
 [Held by provider] mirtazapine (REMERON) tablet 15 mg  15 mg Oral QHS Chet Roland MD      
 [Held by provider] zolpidem (AMBIEN) tablet 5 mg  5 mg Oral QHS Omero Roland MD      
 sodium chloride (NS) flush 5-40 mL  5-40 mL IntraVENous Q8H Omero Roland MD   10 mL at 10/18/21 1448  sodium chloride (NS) flush 5-40 mL  5-40 mL IntraVENous PRN Omero Roland MD      
 acetaminophen (TYLENOL) tablet 650 mg  650 mg Oral Q6H PRN Yue Newberry MD Or  
 acetaminophen (TYLENOL) suppository 650 mg  650 mg Rectal Q6H PRN Omero Roland MD      
 polyethylene glycol (MIRALAX) packet 17 g  17 g Oral DAILY PRN Omero Roland MD      
 ondansetron (ZOFRAN ODT) tablet 4 mg  4 mg Oral Q8H PRN Omero Roland MD      
 Or  
 ondansetron (ZOFRAN) injection 4 mg  4 mg IntraVENous Q6H PRN Omero Roland MD      
 enoxaparin (LOVENOX) injection 40 mg  40 mg SubCUTAneous DAILY Omero Roland MD   40 mg at 10/18/21 7451  piperacillin-tazobactam (ZOSYN) 3.375 g in 0.9% sodium chloride (MBP/ADV) 100 mL MBP  3.375 g IntraVENous Q8H Omero Roland MD 25 mL/hr at 10/18/21 1743 3.375 g at 10/18/21 1743 Past History Past Medical History: 
Past Medical History:  
Diagnosis Date  Cerebellar ataxia ([facility] [address] Past Surgical History: 
Past Surgical History:  
Procedure Laterality Date  MI BREAST SURGERY PROCEDURE UNLISTED    
 breast reduction Family History: 
History reviewed. No pertinent family history. Social History: 
Social History Tobacco Use  Smoking status: Never Smoker  Smokeless tobacco: Never Used Vaping Use  Vaping Use: Never used Substance Use Topics  Alcohol use: Never  Drug use: Never Allergies: 
No Known Allergies Review of Systems Review of Systems Unable to perform ROS: Patient nonverbal  
 
 
Physical Exam  
 
Physical Exam 
Vitals and nursing note reviewed. Constitutional:   
   General: She is in acute distress. Appearance: She is cachectic. She is ill-appearing. Neck:  
   Thyroid: No thyromegaly. Vascular: No JVD. Trachea: No tracheal deviation. Cardiovascular:  
   Rate and Rhythm: Regular rhythm. Tachycardia present. Pulmonary:  
   Effort: Tachypnea and respiratory distress present. Breath sounds: Decreased breath sounds and rhonchi present. Abdominal:  
   Palpations: Abdomen is soft. Tenderness: There is no abdominal tenderness.   
Musculoskeletal: Right lower leg: No tenderness. No edema. Left lower leg: No tenderness. No edema. Skin: 
   General: Skin is warm and dry. Neurological:  
   Mental Status: She is alert. Comments: Contracture of all extremities. Unable to assess motor, sensory or cranial nerves. Lab and Diagnostic Study Results Labs - Recent Results (from the past 12 hour(s)) PROCALCITONIN Collection Time: 10/18/21 11:15 AM  
Result Value Ref Range Procalcitonin 2.21 (H) 0 ng/mL LACTIC ACID Collection Time: 10/18/21  1:58 PM  
Result Value Ref Range Lactic acid 1.1 0.4 - 2.0 mmol/L  
POTASSIUM Collection Time: 10/18/21  1:58 PM  
Result Value Ref Range Potassium 4.3 3.5 - 5.1 mmol/L Radiologic Studies -  
[unfilled] CT Results  (Last 48 hours) 10/17/21 1816  CTA [facility] CONT Final result Impression:  Negative for pulmonary embolus. Findings the lungs consistent with multilobar pneumonia with concomitant  
bronchiolitis. The cavitary foci may represent pneumatoceles or potentially  
septic emboli, echocardiogram recommended when clinically able. Narrative:  Chest CTA TECHNIQUE: Multiple continuous axial images were obtained from the thoracic  
inlet to the upper abdomen after the uneventful administration of intravenous  
contrast. Reformatted images as well as maximum intensity projection images were  
obtained in the sagittal and coronal planes. Comment on dose reduction: All CT scans at this facility are performed using  
dose reduction optimization technique as appropriate to perform the exam  
including the following; automated exposure control, adjustments of the mA  
and/or kV according to patient size, or use of iterative reconstructed  
technique. Comparison examination: Chest radiograph dated October 17, 2021 Findings: LYMPHADENOPATHY: Mediastinal lymph nodes top normal in size, likely reactive to the process in the lungs described below. CARDIOVASCULAR: Negative for pulmonary embolus. Heart normal in size. Thoracic  
aorta normal in caliber, negative for dissection. Great vessels patent. LUNGS AND PLEURA: Tree-in-bud centrilobular micronodules diffusely throughout  
the lungs with subpleural consolidation present right lower lobe and left  
posterior basilar segment. Cavitary lesion with air-fluid level present left  
lower lobe measuring approximately 21 mm, cavitary lesion with air-fluid level  
medial segment middle lobe measuring 21 mm. INCLUDED ABDOMEN: The visualized upper abdomen images normally. CHEST WALL: No suspicious lytic or blastic lesion is identified. CXR Results  (Last 48 hours) 10/18/21 2017  XR CHEST PORT Final result Impression:  1. Enteric tube as above. 2. Persistent multifocal airspace disease with a cavitary lesion in the left  
lateral lung. Narrative:  AP portable chest, 2009 hours. Comparison: 10/17/2021. Findings: There is an enteric tube in place which coils overlying the gastric  
fundus with the tip projected back towards the gastroesophageal junction. Cardiac monitoring leads overlie the chest. The heart is normal in size. There  
is persistent dense consolidation in the right perihilar region. There is a  
cavitary lesion laterally in the left midlung zone. No pleural effusion or  
pneumothorax is identified. The osseous structures are unremarkable. There is  
gaseous distention of the bowel. 10/17/21 1512  XR CHEST PORT Final result Impression:  1. Large right perihilar opacity, mass versus airspace disease. Further  
characterization with CT is recommended. 2. Nodular opacity in the left midlung zone. Further characterization with CT is  
recommended. Narrative:  AP portable chest, 1500 hours. Comparison: None.   
   
Findings: Multiple cardiac monitoring leads overlie the chest. The heart is  
normal in size. There is an 8.4 cm right perihilar opacity. There is a 3.7 cm  
opacity in the left midlung zone. There is no pulmonary vascular congestion. No  
pleural effusion or pneumothorax is identified. The osseous structures are  
unremarkable. Medical Decision Making and ED Course - I am the first and primary provider for this patient AND AM THE PRIMARY PROVIDER OF RECORD. - I reviewed the vital signs, available nursing notes, past medical history, past surgical history, family history and social history. - Initial assessment performed. The patients presenting problems have been discussed, and the staff are in agreement with the care plan formulated and outlined with them. I have encouraged them to ask questions as they arise throughout their visit. Vital Signs-Reviewed the patient's vital signs. Patient Vitals for the past 12 hrs: 
 Temp Pulse Resp BP SpO2  
10/17/21 2023  (!) 101 20 114/73 100 % 10/17/21 1918  97  125/85 100 % 10/17/21 1820 97.7 °F (36.5 °C) (!) 103 26 130/79 96 % 10/17/21 1725  (!) 119 20 123/87 100 % 10/17/21 1655 98.6 °F (37 °C) (!) 118 26 (!) 129/91 94 % 10/17/21 1530     (!) 86 % 10/17/21 1517 98.2 °F (36.8 °C) (!) 128 20 (!) 143/99 100 % 10/17/21 1452  (!) 130 20 126/89 100 % 10/17/21 1449   25 (!) 185/117 100 % 10/17/21 1440  (!) 128    Records Reviewed: Nursing Notes Provider Notes (Medical Decision Making):  
28-year-old female with history of spinocerebellar ataxia presenting in respiratory distress. Patient was noted to have upper airway sounds and had a lot of secretions that were suctioned. After suctioning patient work of breathing has improved but still tachypneic. She did have some rhonchorous sounds. After trial of nonrebreather mask, patient was unable to tolerate and remained to be tachycardic. Given IV fluids without improvement of her tachycardia.   Believe her tachycardia secondary to respiratory distress thus, talk to respiratory therapist and switch the patient I-Flow nasal cannula. After trial of high flow patient appears better and her work of breathing has improved and her thickening is resolving. Chest x-ray is concerning for some opacities. It was followed up with CTA which shows diffuse opacities on the right lower lung. The most likely cause of patient's apparent distress and aspiration pneumonia. Thus, she was covered with antibiotics and admitted to the hospital for further management. ED Course:  
 
 
ED Course as of Oct 18 2119 Sun Oct 17, 2021  
1620 EKG was done at 4:15 PM and interpreted by me as sinus tachycardia rate 138, intervals appear to be within normal, normal axis, no signs of dysrhythmia or blocks. No ST elevations noted. Baseline motion artifact noted. [place]  ED Course User Index 
[AA] Jluis Orr MD  
 
 
 
Procedures and Critical Care CRITICAL CARE NOTE : 
8:57 PM 
Amount of Critical Care Time: 45 (minutes) IMPENDING DETERIORATION -Airway, Respiratory and Cardiovascular ASSOCIATED RISK FACTORS - Hypoxia and Dehydration MANAGEMENT- Bedside Assessment and Supervision of Care INTERPRETATION -  Xrays, ECG and Blood Pressure INTERVENTIONS - hemodynamic mngmt and respiratory management CASE REVIEW - Hospitalist/Intensivist, Nursing and Family TREATMENT RESPONSE -Improved PERFORMED BY - Self NOTES   : 
I have spent critical care time involved in lab review, consultations with specialist, family decision- making, bedside attention and documentation. This time excludes time spent in any separate billed procedures. During this entire length of time I was immediately available to the patient . Vikas Garcia MD 
 
 
 
Disposition Disposition: Condition improved Admitted to Floor Medical Floor the case was discussed with the admitting physician Dr. Linus Dooley Admitted Diagnosis Clinical Impression: 1. Multifocal pneumonia 2. Failure to thrive in adult Attestations: Ebony Carnes MD 
 
Please note that this dictation was completed with [employer], the computer voice recognition software. Quite often unanticipated grammatical, syntax, homophones, and other interpretive errors are inadvertently transcribed by the computer software. Please disregard these errors. Please excuse any errors that have escaped final proofreading. Thank you.

## 2021-10-18 NOTE — WOUND CARE
Delivered a gel mattress to hallway outside of patient's room and informed Aimee Zacarias. I attached a Isoflex Air Pump for increased pressure reduction.

## 2021-10-19 NOTE — PROGRESS NOTES
CM reviewed clinical chart. Patient is still being actively treated by pulmonology, speech language pathologist, and internal medicine. CM will continue to follow for any needs at discharge.

## 2021-10-19 NOTE — PROGRESS NOTES
Transfer skin assessment    Patient left anterior chest is reddened from the left wrist contracted. Posterior ribs are reddened bilaterally. The right hip is reddened with a blister noted, the left hip is reddened. The sacrum/coccyx has nonblachable erythema. The right buttock is reddened with a blister noted. The right great toe has a small wound that is dry with no drainage. The left second toe has a small wound that is dry with no drainage, the left pinky toe is reddened. the right heel is boggy, boots applied bilaterally.

## 2021-10-19 NOTE — PROGRESS NOTES
Chart reviewed, spoke with Dr. Kirsten Richardson and nsg. Pt has been transferred to ICU since SLP consult placed. Pt now with NG tube placement. Per Dr. Kirsten Richardson, pt is not appropriate for bedside sw evaluation or PO Intake at this time. Discussed with nsg re: will d/c SLP consult at this time and please reconsult SLP if/as indicated when medically appropriate.

## 2021-10-19 NOTE — PROGRESS NOTES
Hospitalist Progress Note               Daily Progress Note: 10/19/2021      Subjective:   Patient is seen for follow-up. 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 Patient was noted to be nonverbal, chronically ill looking, contracted and in respiratory distress. Initially oxygen saturations were in the 80s. She was placed on a nonrebreather. CTA of the chest showed multi focal airspace disease and a small cavitary lesion in the left lower lobe. Patient was admitted and started on Zosyn. Family requested DNR CODE STATUS although did agree to intubation if needed    Patient seen in ICU. Resting comfortably in no respiratory distress. On room air.   Required frequent suctioning last night    Problem List:  Problem List as of 10/19/2021 Never Reviewed        Codes Class Noted - Resolved    Severe protein-calorie malnutrition ([facility] [address] ICD-10-CM: E43  ICD-9-CM: 262  10/18/2021 - Present        Acute respiratory failure ([facility] [address] ICD-10-CM: J96.00  ICD-9-CM: 518.81  10/17/2021 - Present        Failure to thrive in adult ICD-10-CM: R62.7  ICD-9-CM: 783.7  10/17/2021 - Present        Multifocal pneumonia ICD-10-CM: J18.9  ICD-9-CM: 375  10/17/2021 - Present              Medications reviewed  Current Facility-Administered Medications   Medication Dose Route Frequency    potassium chloride 10 mEq in 100 ml IVPB  10 mEq IntraVENous Q1H    docusate (COLACE) 50 mg/5 mL oral liquid 100 mg  100 mg Per G Tube BID    glycopyrrolate (ROBINUL) injection 0.1 mg  0.1 mg IntraVENous Q8H    vancomycin (VANCOCIN) 500 mg in 0.9% sodium chloride (MBP/ADV) 100 mL MBP  500 mg IntraVENous Q12H    [START ON 10/20/2021] DUE: Vancomycin Trough 10/20 at 2100   Other ONCE    dextrose 5% infusion  135 mL/hr IntraVENous CONTINUOUS    potassium chloride (KLOR-CON) packet for solution 20 mEq  20 mEq Per NG tube TID WITH MEALS    [Held by provider] mirtazapine (REMERON) tablet 15 mg  15 mg Oral QHS    [Held by provider] zolpidem (AMBIEN) tablet 5 mg  5 mg Oral QHS    sodium chloride (NS) flush 5-40 mL  5-40 mL IntraVENous Q8H    sodium chloride (NS) flush 5-40 mL  5-40 mL IntraVENous PRN    acetaminophen (TYLENOL) tablet 650 mg  650 mg Oral Q6H PRN    Or    acetaminophen (TYLENOL) suppository 650 mg  650 mg Rectal Q6H PRN    polyethylene glycol (MIRALAX) packet 17 g  17 g Oral DAILY PRN    ondansetron (ZOFRAN ODT) tablet 4 mg  4 mg Oral Q8H PRN    Or    ondansetron (ZOFRAN) injection 4 mg  4 mg IntraVENous Q6H PRN    enoxaparin (LOVENOX) injection 40 mg  40 mg SubCUTAneous DAILY    piperacillin-tazobactam (ZOSYN) 3.375 g in 0.9% sodium chloride (MBP/ADV) 100 mL MBP  3.375 g IntraVENous Q8H       Review of Systems:   Review of systems not obtained due to patient factors. Objective:   Physical Exam:     Visit Vitals  /69 (BP 1 Location: Right leg, BP Patient Position: At rest)   Pulse (!) 59   Temp 96.8 °F (36 °C)   Resp 13   Ht 5' 6\" (1.676 m)   Wt 33.7 kg (74 lb 4.7 oz)   LMP  (LMP Unknown)   SpO2 (!) 13%   BMI 11.99 kg/m²    O2 Flow Rate (L/min): 1 l/min (placed on room air) O2 Device: None (Room air)    Temp (24hrs), Av.7 °F (36.5 °C), Min:96.8 °F (36 °C), Max:98.6 °F (37 °C)    No intake/output data recorded. 10/17 1901 - 10/19 0700  In: 145   Out: 200 [Urine:200]    General:   Unresponsive, cachectic and emaciated   Lungs:    Rhonchi bilateral   Chest wall:  No tenderness or deformity. Heart:  Regular rate and rhythm, S1, S2 normal, no murmur, click, rub or gallop. Abdomen:   Soft, non-tender. Bowel sounds normal. No masses,  No organomegaly. Extremities:  Contracted   Pulses: 2+ and symmetric all extremities. Skin: Skin color, texture, turgor normal. No rashes or lesions   Neurologic: CNII-XII intact.   Contractures of upper and lower extremities         Data Review: Recent Days:  Recent Labs     10/19/21  0500 10/18/21  0303 10/17/21  1940   WBC 13.6* 9.6 4.3   HGB 13.4 14.6 14.2   HCT 44.5 49.1* 48.9*    272 172     Recent Labs     10/19/21  0500 10/18/21  1358 10/18/21  0303 10/17/21  1940 10/17/21  1940   *  --  161*  --  164*  164*   K 3.0* 4.3 2.6*   < > 2.4*  2.4*   *  --  128*  --  126*  126*   CO2 28  --  26  --  23  23   *  --  112*  --  93  93   BUN 25*  --  35*  --  36*  36*   CREA 0.28*  --  0.36*  --  0.25*  0.25*   CA 8.7  --  8.7  --  7.2*  7.2*   MG  --   --  2.3  --   --    PHOS 2.0*  --   --   --   --    ALB 2.0*  --   --   --  1.7*   TBILI  --   --   --   --  0.7   ALT  --   --   --   --  13    < > = values in this interval not displayed. No results for input(s): PH, PCO2, PO2, HCO3, FIO2 in the last 72 hours.     24 Hour Results:  Recent Results (from the past 24 hour(s))   PROCALCITONIN    Collection Time: 10/18/21 11:15 AM   Result Value Ref Range    Procalcitonin 2.21 (H) 0 ng/mL   LACTIC ACID    Collection Time: 10/18/21  1:58 PM   Result Value Ref Range    Lactic acid 1.1 0.4 - 2.0 mmol/L   POTASSIUM    Collection Time: 10/18/21  1:58 PM   Result Value Ref Range    Potassium 4.3 3.5 - 5.1 mmol/L   MRSA SCREEN - PCR (NASAL)    Collection Time: 10/18/21  9:37 PM   Result Value Ref Range    MRSA by PCR, Nasal Not Detected Not Detected     CBC WITH AUTOMATED DIFF    Collection Time: 10/19/21  5:00 AM   Result Value Ref Range    WBC 13.6 (H) 3.6 - 11.0 K/uL    RBC 4.79 3.80 - 5.20 M/uL    HGB 13.4 11.5 - 16.0 g/dL    HCT 44.5 35.0 - 47.0 %    MCV 92.9 80.0 - 99.0 FL    MCH 28.0 26.0 - 34.0 PG    MCHC 30.1 30.0 - 36.5 g/dL    RDW 14.3 11.5 - 14.5 %    PLATELET 003 960 - 613 K/uL    MPV 10.4 8.9 - 12.9 FL    NRBC 0.0 0.0  WBC    ABSOLUTE NRBC 0.00 0.00 - 0.01 K/uL    NEUTROPHILS 84 (H) 32 - 75 %    LYMPHOCYTES 12 12 - 49 %    MONOCYTES 3 (L) 5 - 13 %    EOSINOPHILS 0 0 - 7 %    BASOPHILS 0 0 - 1 %    IMMATURE GRANULOCYTES 1 %    ABS. NEUTROPHILS 11.5 (H) 1.8 - 8.0 K/UL    ABS. LYMPHOCYTES 1.6 0.8 - 3.5 K/UL    ABS. MONOCYTES 0.4 0.0 - 1.0 K/UL    ABS. EOSINOPHILS 0.0 0.0 - 0.4 K/UL    ABS. BASOPHILS 0.0 0.0 - 0.1 K/UL    ABS. IMM. GRANS. 0.1 K/UL    DF Smear Scanned      RBC COMMENTS Eola cells  1+       RENAL FUNCTION PANEL    Collection Time: 10/19/21  5:00 AM   Result Value Ref Range    Sodium 160 (H) 136 - 145 mmol/L    Potassium 3.0 (L) 3.5 - 5.1 mmol/L    Chloride 126 (H) 97 - 108 mmol/L    CO2 28 21 - 32 mmol/L    Anion gap 6 5 - 15 mmol/L    Glucose 123 (H) 65 - 100 mg/dL    BUN 25 (H) 6 - 20 mg/dL    Creatinine 0.28 (L) 0.55 - 1.02 mg/dL    BUN/Creatinine ratio 89 (H) 12 - 20      GFR est AA >60 >60 ml/min/1.73m2    GFR est non-AA >60 >60 ml/min/1.73m2    Calcium 8.7 8.5 - 10.1 mg/dL    Phosphorus 2.0 (L) 2.6 - 4.7 mg/dL    Albumin 2.0 (L) 3.5 - 5.0 g/dL   VANCOMYCIN, RANDOM    Collection Time: 10/19/21  5:00 AM   Result Value Ref Range    Vancomycin, random 6.8 ug/mL       XR CHEST PORT   Final Result   FINDINGS: IMPRESSION: Single frontal view of the chest. Patient's left hand   obscures the left lung/hemithorax. Enteric tube distal tip below left hemidiaphragm. Normal heart size. Similar right perihilar and upper lobe airspace disease. No sizable pleural   effusion or pneumothorax. XR CHEST PORT   Final Result   Slightly retracted enteric tube, otherwise unchanged. XR CHEST PORT   Final Result   1. Enteric tube as above. 2. Persistent multifocal airspace disease with a cavitary lesion in the left   lateral lung. CTA CHEST W OR W WO CONT   Final Result   Negative for pulmonary embolus. Findings the lungs consistent with multilobar pneumonia with concomitant   bronchiolitis. The cavitary foci may represent pneumatoceles or potentially   septic emboli, echocardiogram recommended when clinically able. XR CHEST PORT   Final Result   1.  Large right perihilar opacity, mass versus airspace disease. Further   characterization with CT is recommended. 2. Nodular opacity in the left midlung zone. Further characterization with CT is   recommended. XR NECK SOFT TISSUE   Final Result   No significant soft tissue swelling or radiodense foreign body. XR ABD PORT  1 V    (Results Pending)   XR CHEST PORT    (Results Pending)   XR ABD (KUB)    (Results Pending)        Assessment:  Multifocal bilateral pneumonia with cavitary lesion left lower lobe    Acute respiratory failure with hypoxia    Hypernatremia due to dehydration    Sepsis with tachycardia and elevated lactate. Present on admission    Hypokalemia    Possible complicated UTI    Hereditary spinocerebellar ataxia with chronic debilitation    Adult failure to thrive    Severe protein calorie malnutrition      Plan:  Continue supportive care including IV zosyn/vancomycin  Monitor routine electrolytes  Replete potassium  Continue NG tube feeds    Prognosis appears guarded    Care Plan discussed with: Nurse     Dr Maryellen Wright updated her cousin Casper Canales who is legal next of kin. Discussed her very poor condition, severe malnutrition and severe pneumonia. Recommended she consider palliative care but she wants to continue being very aggressive. She is requesting a PEG tube as well, which patient will not be stable for unless her condition improves. For now, we will place an NG tube and start tube feeds    Disposition: Continued inpatient care    Total time spent with patient: 30 minutes.     Yossi Edwards MD

## 2021-10-19 NOTE — PROGRESS NOTES
Consult  Pulmonary, Critical Care    Name: Mary Vernon MRN: [id number]   : 1985 Hospital: [facility]   Date: 10/19/2021  Admission date: 10/17/2021 Hospital Day: 3       Subjective/Interval History:   Patient seen on the medical floor. Has best I can understand she is bedbound nonverbal but does eat orally. In any event she has had gurgling respirations were brought to the emergency room has bilateral pneumonia right base greater than left base with a cavitary area in the left lower lung pleural base. She is normally followed at [facility]  10/19 now in the ICU on room air. Apparently had worsening respiratory status last evening and had desaturation was transferred to the ICU has had frequent oral suctioning and is now back on room air    Hospital Problems  Never Reviewed        Codes Class Noted POA    Severe protein-calorie malnutrition ([facility] [address] ICD-10-CM: E43  ICD-9-CM: 262  10/18/2021 Unknown        Acute respiratory failure ([facility] [address] ICD-10-CM: J96.00  ICD-9-CM: 518.81  10/17/2021 Unknown        Failure to thrive in adult ICD-10-CM: R62.7  ICD-9-CM: 783.7  10/17/2021 Unknown        Multifocal pneumonia ICD-10-CM: J18.9  ICD-9-CM: 486  10/17/2021 Unknown              IMPRESSION:   1. Acute hypoxic respiratory failure back on room air  2. Aspiration pneumonia  3. Pharyngeal dysphagia  4. Possible gastroparesis with reflux  5. Probable impaction with ileus  6. Ileus  7. Altered mental status  8. Spinocerebellar ataxia  9. Severe hypernatremia minimally improved  10. Severe hypokalemia to be repleted  11. Severe malnutrition  Body mass index is 11.99 kg/m². 12.       RECOMMENDATIONS/PLAN:   1. Adjust oxygen supplementation to maintain oxygen saturation greater than 90%  2. Maintain n.p.o. status. 3. Would ask speech to evaluate  4. I think she needs a PEG tube  5. Agree IV D5W to correct hypernatremia  6. Replace potassium  7.  Abdominal x-ray to me suggest ileus with fecal impaction [x] High complexity decision making was performed  [x] See my orders for details      Subjective/Initial History:     I was asked by Yaneth Lucio MD to see Jonas Mcmillan  a 39 y.o.  female in consultation for a chief complaint of acute hypoxic respiratory failure aspiration pneumonia with cavitation        No Known Allergies     MAR reviewed and pertinent medications noted or modified as needed     Current Facility-Administered Medications   Medication    dextrose 5% infusion    Vancomycin - Pharmacy to Dose    Vancomycin - Random level to be drawn 10/19 @ 0400    potassium chloride (KLOR-CON) packet for solution 20 mEq    [Held by provider] mirtazapine (REMERON) tablet 15 mg    [Held by provider] zolpidem (AMBIEN) tablet 5 mg    sodium chloride (NS) flush 5-40 mL    sodium chloride (NS) flush 5-40 mL    acetaminophen (TYLENOL) tablet 650 mg    Or    acetaminophen (TYLENOL) suppository 650 mg    polyethylene glycol (MIRALAX) packet 17 g    ondansetron (ZOFRAN ODT) tablet 4 mg    Or    ondansetron (ZOFRAN) injection 4 mg    enoxaparin (LOVENOX) injection 40 mg    piperacillin-tazobactam (ZOSYN) 3.375 g in 0.9% sodium chloride (MBP/ADV) 100 mL MBP      Patient PCP: Unknown, Provider, MD  PMH:  has a past medical history of Cerebellar ataxia ([address] PSH:   has a past surgical history that includes pr breast surgery procedure unlisted. FHX: family history is not on file. SHX:  reports that she has never smoked. She has never used smokeless tobacco. She reports that she does not drink alcohol and does not use drugs.   Systemic review unobtainable as the patient is nonverbal      Objective:     Vital Signs: Telemetry:    normal sinus rhythm Intake/Output:   Visit Vitals  /75 (BP 1 Location: Right leg, BP Patient Position: At rest)   Pulse 62   Temp 96.8 °F (36 °C)   Resp 12   Ht 5' 6\" (1.676 m)   Wt 33.7 kg (74 lb 4.7 oz)   LMP  (LMP Unknown)   SpO2 100%   BMI 11.99 kg/m²       Temp (24hrs), Av.7 °F (36.5 °C), Min:96.8 °F (36 °C), Max:98.6 °F (37 °C)        O2 Device: None (Room air) O2 Flow Rate (L/min): 1 l/min (placed on room air)       Wt Readings from Last 4 Encounters:   10/18/21 33.7 kg (74 lb 4.7 oz)          Intake/Output Summary (Last 24 hours) at 10/19/2021 0928  Last data filed at 10/19/2021 0300  Gross per 24 hour   Intake 145 ml   Output 200 ml   Net -55 ml       Last shift:      No intake/output data recorded. Last 3 shifts: 10/17 190 - 10/19 0700  In: 145   Out: 200 [Urine:200]       Physical Exam:   General:  female; in bed profound malnutrition with upper airway noise  HEENT: NCAT, poor dentition, lips and mucosa dry  Eyes: anicteric; conjunctiva clear random eye movement present  Neck: no nodes, no JVD, no accessory MM use  Chest: no deformity,   Cardiac: Rapid regular rhythm  Lungs: Gurgling upper airway noise over the neck transmitted to the lungs but I believe she also has rales and rhonchi both lateral and lower areas  Abd: Thin emaciated bowel sounds present  Ext: no edema; no joint swelling;  No clubbing  : clear urine  Neuro: Eyes are open does not look at voice has some random eye movement contracted extremities  Psych-nonverbal unable to assess  Skin: warm, dry, no cyanosis;   Pulses: Brachial radial femoral pulses intact  Capillary: Normal capillary refill    Labs:    Recent Labs     10/19/21  0500 10/18/21  0303 10/17/21  1940   WBC 13.6* 9.6 4.3   HGB 13.4 14.6 14.2    272 172     Recent Labs     10/19/21  0500 10/18/21  1358 10/18/21  0303 10/17/21  1940 10/17/21  1940   *  --  161*  --  164*  164*   K 3.0* 4.3 2.6*   < > 2.4*  2.4*   *  --  128*  --  126*  126*   CO2 28  --  26  --  23  23   *  --  112*  --  93  93   BUN 25*  --  35*  --  36*  36*   CREA 0.28*  --  0.36*  --  0.25*  0.25*   CA 8.7  --  8.7  --  7.2*  7.2*   MG  --   --  2.3  --   --    PHOS 2.0*  --   --   --   --    LAC --  1.1 2.2*  --  3.2*  3.2*   ALB 2.0*  --   --   --  1.7*   ALT  --   --   --   --  13    < > = values in this interval not displayed. Room air oxygen saturation 98%  Nasal MRSA smear negative  Blood no growth  Urine culture pending  COVID-19 antigen negative  Procalcitonin 2.21  Lab Results   Component Value Date/Time    Culture result: No growth after 2 hours 10/17/2021 05:30 PM     Imaging:    CXR Results  (Last 48 hours)               10/19/21 0333  XR CHEST PORT Final result    Impression:  FINDINGS: IMPRESSION: Single frontal view of the chest. Patient's left hand   obscures the left lung/hemithorax. Enteric tube distal tip below left hemidiaphragm. Normal heart size. Similar right perihilar and upper lobe airspace disease. No sizable pleural   effusion or pneumothorax. To me I agree there is right upper lobe infiltrate there is also left basilar infiltrate with obscuration of the hemidiaphragm       Narrative:  Aspiration pneumonia. Comparison chest x-ray 10/18/2021.           10/18/21 2115  XR CHEST PORT Final result    Impression:  Slightly retracted enteric tube, otherwise unchanged. Narrative:  AP portable chest, 2108 hours. Comparison: Earlier the same day at 2009 hours. Findings: The enteric tube has been retracted slightly. The tip is in the region   of the gastric fundus. The sidehole remains below the diaphragm. Cardiac   monitoring leads overlie the chest.       The heart is normal in size. Multifocal airspace disease and a left-sided   cavitary lesion are again noted. There is no pleural effusion or pneumothorax. There is gaseous distention of the colon. 10/18/21 2017  XR CHEST PORT Final result    Impression:  1. Enteric tube as above. 2. Persistent multifocal airspace disease with a cavitary lesion in the left   lateral lung. Narrative:  AP portable chest, 2009 hours. Comparison: 10/17/2021. Findings:  There is an enteric tube in place which coils overlying the gastric   fundus with the tip projected back towards the gastroesophageal junction. Cardiac monitoring leads overlie the chest. The heart is normal in size. There   is persistent dense consolidation in the right perihilar region. There is a   cavitary lesion laterally in the left midlung zone. No pleural effusion or   pneumothorax is identified. The osseous structures are unremarkable. There is   gaseous distention of the bowel. 10/17/21 1512  XR CHEST PORT Final result    Impression:  1. Large right perihilar opacity, mass versus airspace disease. Further   characterization with CT is recommended. 2. Nodular opacity in the left midlung zone. Further characterization with CT is   recommended. Narrative:  AP portable chest, 1500 hours. Comparison: None. Findings: Multiple cardiac monitoring leads overlie the chest. The heart is   normal in size. There is an 8.4 cm right perihilar opacity. There is a 3.7 cm   opacity in the left midlung zone. There is no pulmonary vascular congestion. No   pleural effusion or pneumothorax is identified. The osseous structures are   unremarkable. Results from [place] encounter on 10/17/21    XR CHEST PORT    Narrative  AP portable chest, 2108 hours. Comparison: Earlier the same day at 2009 hours. Findings: The enteric tube has been retracted slightly. The tip is in the region  of the gastric fundus. The sidehole remains below the diaphragm. Cardiac  monitoring leads overlie the chest.    The heart is normal in size. Multifocal airspace disease and a left-sided  cavitary lesion are again noted. There is no pleural effusion or pneumothorax. There is gaseous distention of the colon. Impression  Slightly retracted enteric tube, otherwise unchanged. XR CHEST PORT    Narrative  AP portable chest, 2009 hours. Comparison: 10/17/2021. Findings:  There is an enteric tube in place which coils overlying the gastric  fundus with the tip projected back towards the gastroesophageal junction. Cardiac monitoring leads overlie the chest. The heart is normal in size. There  is persistent dense consolidation in the right perihilar region. There is a  cavitary lesion laterally in the left midlung zone. No pleural effusion or  pneumothorax is identified. The osseous structures are unremarkable. There is  gaseous distention of the bowel. Impression  1. Enteric tube as above. 2. Persistent multifocal airspace disease with a cavitary lesion in the left  lateral lung. XR CHEST PORT    Narrative  Aspiration pneumonia. Comparison chest x-ray 10/18/2021. Impression  FINDINGS: IMPRESSION: Single frontal view of the chest. Patient's left hand  obscures the left lung/hemithorax. Enteric tube distal tip below left hemidiaphragm. Normal heart size. Similar right perihilar and upper lobe airspace disease. No sizable pleural  effusion or pneumothorax. Results from [place] encounter on 10/17/21    CTA CHEST W OR W WO CONT    Narrative  Chest CTA    TECHNIQUE: Multiple continuous axial images were obtained from the thoracic  inlet to the upper abdomen after the uneventful administration of intravenous  contrast. Reformatted images as well as maximum intensity projection images were  obtained in the sagittal and coronal planes. Comment on dose reduction: All CT scans at this facility are performed using  dose reduction optimization technique as appropriate to perform the exam  including the following; automated exposure control, adjustments of the mA  and/or kV according to patient size, or use of iterative reconstructed  technique. Comparison examination: Chest radiograph dated October 17, 2021    Findings:  LYMPHADENOPATHY: Mediastinal lymph nodes top normal in size, likely reactive to  the process in the lungs described below.     CARDIOVASCULAR: Negative for pulmonary embolus. Heart normal in size. Thoracic  aorta normal in caliber, negative for dissection. Great vessels patent. LUNGS AND PLEURA: Tree-in-bud centrilobular micronodules diffusely throughout  the lungs with subpleural consolidation present right lower lobe and left  posterior basilar segment. Cavitary lesion with air-fluid level present left  lower lobe measuring approximately 21 mm, cavitary lesion with air-fluid level  medial segment middle lobe measuring 21 mm. INCLUDED ABDOMEN: The visualized upper abdomen images normally. CHEST WALL: No suspicious lytic or blastic lesion is identified. Impression  Negative for pulmonary embolus. Findings the lungs consistent with multilobar pneumonia with concomitant  bronchiolitis. The cavitary foci may represent pneumatoceles or potentially  septic emboli, echocardiogram recommended when clinically able. Discussion patient has gurgling respirations has pharyngeal dysphagia is chronically aspirating. Initial x-ray was suspicious for gastric distention as well very likely has gastroparesis with reflux and aspiration on that. Chest x-ray and CT show bibasilar infiltrates right greater than left consistent with aspiration and there is a cavitary area in the left lung which suggest this is a chronic process. She is on Zosyn I agree with that. Agree n.p.o. status with feeding tube. She likely needs a PEG tube  10/19 now in the ICU due to respiratory distress has a lot of upper airway noise with inability to control oral secretions. Nurses are requesting Robinul however abdominal x-ray is suggestive of ileus with fecal impaction. Robinul could possibly make this worse. Despite this we will place on small dose Robinul. We will give enema start Colace per tube. Nasal MRSA smear and blood culture are negative we will discontinue vancomycin. Potassium to be repleted IV. Sodium remains highly elevated but is minimally improved.   Time of care 30 millicent Kimbrough, MD

## 2021-10-20 NOTE — ROUTINE PROCESS
Report called to Adelina Ely RN on [address] [place] [address]. Patient to be transferred to room 509 on room air. No patient belongings at bedside. Tele box [phone].

## 2021-10-20 NOTE — PROGRESS NOTES
Consult  Pulmonary, Critical Care    Name: Braulio Muse MRN: [id number]   : 1985 Hospital: [address] [place] [address]   Date: 10/20/2021  Admission date: 10/17/2021 Hospital Day: 4       Subjective/Interval History:   Patient seen on the medical floor. Has best I can understand she is bedbound nonverbal but does eat orally. In any event she has had gurgling respirations were brought to the emergency room has bilateral pneumonia right base greater than left base with a cavitary area in the left lower lung pleural base. She is normally followed at [facility]  10/19 now in the ICU on room air. Apparently had worsening respiratory status last evening and had desaturation was transferred to the ICU has had frequent oral suctioning and is now back on room air  10/20 NG tube in place currently on room air saturation 98% looks at voice otherwise unresponsive    Hospital Problems  Never Reviewed        Codes Class Noted POA    Severe protein-calorie malnutrition ([facility] ICD-10-CM: E43  ICD-9-CM: 043  10/18/2021 Unknown        Acute respiratory failure ([facility] ICD-10-CM: J96.00  ICD-9-CM: 518.81  10/17/2021 Unknown        Failure to thrive in adult ICD-10-CM: R62.7  ICD-9-CM: 783.7  10/17/2021 Unknown        Multifocal pneumonia ICD-10-CM: J18.9  ICD-9-CM: 486  10/17/2021 Unknown              IMPRESSION:   1. Acute hypoxic respiratory failure back on room air  2. Aspiration pneumonia chest x-ray improving  3. Pharyngeal dysphagia  4. Possible gastroparesis with reflux  5. Fecal impaction with ileus  6. Ileus has bowel sounds  7. Altered mental status  8. Spinocerebellar ataxia  9. Severe hypernatremia labs pending  10. Severe hypokalemia labs pending  11. Severe malnutrition  Body mass index is 12.28 kg/m². 12.       RECOMMENDATIONS/PLAN:   1. Leave on room air  2. Maintain n.p.o. status. 3. Will reconsult speech  4. I think she needs a PEG tube  5.  Continue IV D5W to correct hypernatremia decrease rate  6.   7. Abdominal x-ray to me suggest ileus with fecal impaction          [x] High complexity decision making was performed  [x] See my orders for details      Subjective/Initial History:     I was asked by Abelino Jasso MD to see Silvina Encarnacion  a 39 y.o.  female in consultation for a chief complaint of acute hypoxic respiratory failure aspiration pneumonia with cavitation        No Known Allergies     MAR reviewed and pertinent medications noted or modified as needed     Current Facility-Administered Medications   Medication    docusate (COLACE) 50 mg/5 mL oral liquid 100 mg    glycopyrrolate (ROBINUL) injection 0.1 mg    vancomycin (VANCOCIN) 500 mg in 0.9% sodium chloride (MBP/ADV) 100 mL MBP    DUE: Vancomycin Trough 10/20 at 2100    dextrose 5% infusion    potassium chloride (KLOR-CON) packet for solution 20 mEq    [Held by provider] mirtazapine (REMERON) tablet 15 mg    [Held by provider] zolpidem (AMBIEN) tablet 5 mg    sodium chloride (NS) flush 5-40 mL    sodium chloride (NS) flush 5-40 mL    acetaminophen (TYLENOL) tablet 650 mg    Or    acetaminophen (TYLENOL) suppository 650 mg    polyethylene glycol (MIRALAX) packet 17 g    ondansetron (ZOFRAN ODT) tablet 4 mg    Or    ondansetron (ZOFRAN) injection 4 mg    enoxaparin (LOVENOX) injection 40 mg    piperacillin-tazobactam (ZOSYN) 3.375 g in 0.9% sodium chloride (MBP/ADV) 100 mL MBP      Patient PCP: Unknown, Provider, MD  PMH:  has a past medical history of Cerebellar ataxia ([facility] [address] PSH:   has a past surgical history that includes pr breast surgery procedure unlisted. FHX: family history is not on file. SHX:  reports that she has never smoked. She has never used smokeless tobacco. She reports that she does not drink alcohol and does not use drugs.   Systemic review unobtainable as the patient is nonverbal      Objective:     Vital Signs: Telemetry:    normal sinus rhythm Intake/Output:   Visit Vitals  /84 Pulse 67   Temp 96.9 °F (36.1 °C)   Resp 18   Ht 5' 6\" (1.676 m)   Wt 34.5 kg (76 lb 0.9 oz)   LMP  (LMP Unknown)   SpO2 100%   BMI 12.28 kg/m²       Temp (24hrs), Av.2 °F (36.2 °C), Min:96.5 °F (35.8 °C), Max:98.2 °F (36.8 °C)        O2 Device: None (Room air) O2 Flow Rate (L/min): 2 l/min       Wt Readings from Last 4 Encounters:   10/20/21 34.5 kg (76 lb 0.9 oz)          Intake/Output Summary (Last 24 hours) at 10/20/2021 0900  Last data filed at 10/20/2021 2585  Gross per 24 hour   Intake 630 ml   Output    Net 630 ml       Last shift:      No intake/output data recorded. Last 3 shifts: 10/18 1901 - 10/20 0700  In: 775 [I.V.:200]  Out: 200 [Urine:200]       Physical Exam:   General:  female; in bed profound malnutrition no upper airway noise  HEENT: NCAT, poor dentition, lips and mucosa dry  Eyes: anicteric; conjunctiva clear random eye movement present  Neck: no nodes, no JVD, no accessory MM use  Chest: no deformity,   Cardiac: Regular rate and rhythm  Lungs: No upper airway noise today has a few rales toward the bases very shallow abrasions  Abd: Thin emaciated bowel sounds present  Ext: no edema; no joint swelling;  No clubbing  : clear urine  Neuro: Eyes are open does not look at voice has some random eye movement contracted extremities  Psych-nonverbal unable to assess  Skin: warm, dry, no cyanosis;   Pulses: Brachial radial femoral pulses intact  Capillary: Normal capillary refill    Labs:    Recent Labs     10/19/21  0500 10/18/21  0303 10/17/21  1940   WBC 13.6* 9.6 4.3   HGB 13.4 14.6 14.2    272 172     Recent Labs     10/19/21  0500 10/18/21  1358 10/18/21  0303 10/17/21  1940 10/17/21  1940   *  --  161*  --  164*  164*   K 3.0* 4.3 2.6*   < > 2.4*  2.4*   *  --  128*  --  126*  126*   CO2 28  --  26  --  23  23   *  --  112*  --  93  93   BUN 25*  --  35*  --  36*  36*   CREA 0.28*  --  0.36*  --  0.25*  0.25*   CA 8.7  --  8.7  --  7.2* 7.2*   MG  --   --  2.3  --   --    PHOS 2.0*  --   --   --   --    LAC  --  1.1 2.2*  --  3.2*  3.2*   ALB 2.0*  --   --   --  1.7*   ALT  --   --   --   --  13    < > = values in this interval not displayed. 10/20 on 2 L nasal oxygen PO2 115 PCO2 40 pH 7.42  10/20 room air oxygen saturation 98%     Nasal MRSA smear negative  Blood 1 of 4 bottles with gram-positive cocci  Urine culture no growth  COVID-19 antigen negative  Procalcitonin 2.21  Lab Results   Component Value Date/Time    Culture result: No significant growth, <10,000 CFU/mL 10/18/2021 11:15 AM    Culture result:  10/17/2021 05:30 PM     One of four bottles has been flagged positive by instrument. Bottle has been sent to [facility] laboratory to assess for possible growth. Gram Positive Cocci CALLED TO AND READ BACK BY HOME Ayala R.N. 1030 10/19/2021 BY DPKAI     Imaging:    CXR Results  (Last 48 hours)               10/19/21 0333  XR CHEST PORT Final result    Impression:  FINDINGS: IMPRESSION: Single frontal view of the chest. Patient's left hand   obscures the left lung/hemithorax. Enteric tube distal tip below left hemidiaphragm. Normal heart size. Similar right perihilar and upper lobe airspace disease. No sizable pleural   effusion or pneumothorax. To me I agree there is right upper lobe infiltrate there is also left basilar infiltrate with obscuration of the hemidiaphragm       Narrative:  Aspiration pneumonia. Comparison chest x-ray 10/18/2021.           10/18/21 2115  XR CHEST PORT Final result    Impression:  Slightly retracted enteric tube, otherwise unchanged. Narrative:  AP portable chest, 2108 hours. Comparison: Earlier the same day at 2009 hours. Findings: The enteric tube has been retracted slightly. The tip is in the region   of the gastric fundus. The sidehole remains below the diaphragm. Cardiac   monitoring leads overlie the chest.       The heart is normal in size.  Multifocal airspace disease and a left-sided   cavitary lesion are again noted. There is no pleural effusion or pneumothorax. There is gaseous distention of the colon. 10/18/21 2017  XR CHEST PORT Final result    Impression:  1. Enteric tube as above. 2. Persistent multifocal airspace disease with a cavitary lesion in the left   lateral lung. Narrative:  AP portable chest, 2009 hours. Comparison: 10/17/2021. Findings: There is an enteric tube in place which coils overlying the gastric   fundus with the tip projected back towards the gastroesophageal junction. Cardiac monitoring leads overlie the chest. The heart is normal in size. There   is persistent dense consolidation in the right perihilar region. There is a   cavitary lesion laterally in the left midlung zone. No pleural effusion or   pneumothorax is identified. The osseous structures are unremarkable. There is   gaseous distention of the bowel. 10/17/21 1512  XR CHEST PORT Final result    Impression:  1. Large right perihilar opacity, mass versus airspace disease. Further   characterization with CT is recommended. 2. Nodular opacity in the left midlung zone. Further characterization with CT is   recommended. Narrative:  AP portable chest, 1500 hours. Comparison: None. Findings: Multiple cardiac monitoring leads overlie the chest. The heart is   normal in size. There is an 8.4 cm right perihilar opacity. There is a 3.7 cm   opacity in the left midlung zone. There is no pulmonary vascular congestion. No   pleural effusion or pneumothorax is identified. The osseous structures are   unremarkable. Results from [place] encounter on 10/17/21    XR ABD (KUB)    Narrative  Abdomen, single view. Comparison single view abdomen October 19, 2021. NG tube noted with its tip extending to the left upper quadrant abdomen. Air through nondilated small and large bowel loops.  Excess stool through distal  colon/rectum, suspect fecal impaction. XR CHEST PORT    Narrative  Chest single view. Comparison single view chest October 19, 2021. Stable NG tube. Slightly lesser volume RUL opacity. Remainder of the lungs are clear. Cardiac  and mediastinal structures unchanged. No pneumothorax or sizable pleural  effusion. XR CHEST PORT    Narrative  AP portable chest, 2108 hours. Comparison: Earlier the same day at 2009 hours. Findings: The enteric tube has been retracted slightly. The tip is in the region  of the gastric fundus. The sidehole remains below the diaphragm. Cardiac  monitoring leads overlie the chest.    The heart is normal in size. Multifocal airspace disease and a left-sided  cavitary lesion are again noted. There is no pleural effusion or pneumothorax. There is gaseous distention of the colon. Impression  Slightly retracted enteric tube, otherwise unchanged. Results from [place] encounter on 10/17/21    CTA CHEST W OR W WO CONT    Narrative  Chest CTA    TECHNIQUE: Multiple continuous axial images were obtained from the thoracic  inlet to the upper abdomen after the uneventful administration of intravenous  contrast. Reformatted images as well as maximum intensity projection images were  obtained in the sagittal and coronal planes. Comment on dose reduction: All CT scans at this facility are performed using  dose reduction optimization technique as appropriate to perform the exam  including the following; automated exposure control, adjustments of the mA  and/or kV according to patient size, or use of iterative reconstructed  technique. Comparison examination: Chest radiograph dated October 17, 2021    Findings:  LYMPHADENOPATHY: Mediastinal lymph nodes top normal in size, likely reactive to  the process in the lungs described below. CARDIOVASCULAR: Negative for pulmonary embolus. Heart normal in size.  Thoracic  aorta normal in caliber, negative for dissection. Great vessels patent. LUNGS AND PLEURA: Tree-in-bud centrilobular micronodules diffusely throughout  the lungs with subpleural consolidation present right lower lobe and left  posterior basilar segment. Cavitary lesion with air-fluid level present left  lower lobe measuring approximately 21 mm, cavitary lesion with air-fluid level  medial segment middle lobe measuring 21 mm. INCLUDED ABDOMEN: The visualized upper abdomen images normally. CHEST WALL: No suspicious lytic or blastic lesion is identified. Impression  Negative for pulmonary embolus. Findings the lungs consistent with multilobar pneumonia with concomitant  bronchiolitis. The cavitary foci may represent pneumatoceles or potentially  septic emboli, echocardiogram recommended when clinically able. Discussion patient has gurgling respirations has pharyngeal dysphagia is chronically aspirating. Initial x-ray was suspicious for gastric distention as well very likely has gastroparesis with reflux and aspiration on that. Chest x-ray and CT show bibasilar infiltrates right greater than left consistent with aspiration and there is a cavitary area in the left lung which suggest this is a chronic process. She is on Zosyn I agree with that. Agree n.p.o. status with feeding tube. She likely needs a PEG tube  10/19 now in the ICU due to respiratory distress has a lot of upper airway noise with inability to control oral secretions. Nurses are requesting Robinul however abdominal x-ray is suggestive of ileus with fecal impaction. Robinul could possibly make this worse. Despite this we will place on small dose Robinul. We will give enema start Colace per tube. Nasal MRSA smear and blood culture are negative we will discontinue vancomycin. Potassium to be repleted IV. Sodium remains highly elevated but is minimally improved. 10/20 respirations nonlabored today upper airway noise absent.   Will decrease dose Robinul as I am worried about ileus.  Abdominal x-ray still suggest fecal impaction. She received an enema yesterday we will repeat today and add sorbitol per G-tube.   Would continue NG feedings  Time of care 30 minutes    Rita Dillard MD

## 2021-10-20 NOTE — CONSULTS
Consult    Patient: Minoo Avery MRN: [id number]  SSN: [id number]    YOB: 1985  Age: 39 y.o. Sex: female      Subjective:      Minoo Avery is a 39 y.o. female who is being seen for difficult to feeding, possible PEG tube placement needed. History from medical records  Patient with cerebral ataxia with some chronic difficulty swallowing, normally followed by [facility].  Presented hospital with respiratory distress, CTA showed multiple airspace disease, patient made hospital for antibiotic treatment for aspiration pneumonia sepsis, had NG tube placed, GI consultation for PEG tube placement, patient not improving history, history from medical records, patient has transferred to medical unit today, now patient had more issue with breathing on the medical unit, patient will be transferred back to the hospital in ICU for further evaluation,  Past Medical History:   Diagnosis Date    Cerebellar ataxia ([address]      Past Surgical History:   Procedure Laterality Date    [place] BREAST SURGERY PROCEDURE UNLISTED      breast reduction      History reviewed. No pertinent family history.   Social History     Tobacco Use    Smoking status: Never Smoker    Smokeless tobacco: Never Used   Substance Use Topics    Alcohol use: Never      Current Facility-Administered Medications   Medication Dose Route Frequency Provider Last Rate Last Admin    sorbitoL 70 % solution 30 mL  30 mL Per G Tube DAILY Grace Wise MD   30 mL at 10/20/21 3630    glycopyrrolate (ROBINUL) tablet 0.5 mg  0.5 mg Per G Tube BID Grace Wise MD        sodium phosphate (FLEET'S) enema 1 Enema  1 Enema Rectal NOW Grace Wise MD        docusate (COLACE) 50 mg/5 mL oral liquid 100 mg  100 mg Per G Tube BID Grace Wise MD   100 mg at 10/20/21 6296    dextrose 5% infusion  75 mL/hr IntraVENous CONTINUOUS Grace Wise MD 75 mL/hr at 10/20/21 0928 75 mL/hr at 10/20/21 0928    potassium chloride (KLOR-CON) packet for solution 20 mEq  20 mEq Per NG tube TID WITH MEALS Solange Garg MD   20 mEq at 10/20/21 0928    [Held by provider] mirtazapine (REMERON) tablet 15 mg  15 mg Oral QHS Omero Roland MD        sodium chloride (NS) flush 5-40 mL  5-40 mL IntraVENous Q8H Omero Roland MD   10 mL at 10/20/21 0646    sodium chloride (NS) flush 5-40 mL  5-40 mL IntraVENous PRN Jacob Ruvalcaba MD        acetaminophen (TYLENOL) tablet 650 mg  650 mg Oral Q6H PRN Omero Roland MD        Or    acetaminophen (TYLENOL) suppository 650 mg  650 mg Rectal Q6H PRN Omero Roland MD        polyethylene glycol (MIRALAX) packet 17 g  17 g Oral DAILY PRN Omero Roland MD        ondansetron (ZOFRAN ODT) tablet 4 mg  4 mg Oral Q8H PRN Omero Roland MD        Or    ondansetron (ZOFRAN) injection 4 mg  4 mg IntraVENous Q6H PRN Omero Roland MD        enoxaparin (LOVENOX) injection 40 mg  40 mg SubCUTAneous DAILY Omero Roland MD   40 mg at 10/20/21 0928    piperacillin-tazobactam (ZOSYN) 3.375 g in 0.9% sodium chloride (MBP/ADV) 100 mL MBP  3.375 g IntraVENous Q8H Omero Roland MD 25 mL/hr at 10/20/21 0928 3.375 g at 10/20/21 0928        No Known Allergies    Review of Systems:  Review of Systems   Unable to perform ROS: Mental acuity        Objective:     Vitals:    10/20/21 0600 10/20/21 0700 10/20/21 0800 10/20/21 0935   BP: 117/84 105/79 112/86 115/86   Pulse: 68 (!) 58 67 70   Resp: 18 15 15 15   Temp:       SpO2: 100% 100% 100% 100%   Weight:       Height:            Physical Exam:  Physical Exam  Constitutional:       Appearance: She is ill-appearing. HENT:      Head: Normocephalic. Mouth/Throat:      Mouth: Mucous membranes are dry. Eyes:      General: No scleral icterus. Pupils: Pupils are equal, round, and reactive to light. Cardiovascular:      Rate and Rhythm: Regular rhythm. Pulmonary:      Breath sounds: Wheezing and rhonchi present. Abdominal:      General: Abdomen is flat.       Tenderness: There is no abdominal tenderness. Musculoskeletal:         General: Deformity present. Cervical back: Normal range of motion. Skin:     Findings: No bruising. Recent Results (from the past 24 hour(s))   GLUCOSE, POC    Collection Time: 10/19/21  6:11 PM   Result Value Ref Range    Glucose (POC) 110 65 - 117 mg/dL    Performed by Mook IBRAHIM    BLOOD GAS, ARTERIAL    Collection Time: 10/20/21  5:45 AM   Result Value Ref Range    pH 7.42 7.35 - 7.45      PCO2 40 35 - 45 mmHg    PO2 115 (H) 75 - 100 mmHg    O2 SAT 97 >95 %    BICARBONATE 25 22 - 26 mmol/L    BASE EXCESS 0.9 0 - 2 mmol/L    O2 METHOD Nasal Cannula      O2 FLOW RATE 2.0 L/min    SITE Left Radial      HUGO'S TEST PASS     CBC WITH AUTOMATED DIFF    Collection Time: 10/20/21  8:23 AM   Result Value Ref Range    WBC 14.1 (H) 3.6 - 11.0 K/uL    RBC 4.06 3.80 - 5.20 M/uL    HGB 11.5 11.5 - 16.0 g/dL    HCT 36.4 35.0 - 47.0 %    MCV 89.7 80.0 - 99.0 FL    MCH 28.3 26.0 - 34.0 PG    MCHC 31.6 30.0 - 36.5 g/dL    RDW 13.6 11.5 - 14.5 %    PLATELET 986 918 - 311 K/uL    NRBC 0.1 (H) 0.0  WBC    ABSOLUTE NRBC 0.02 (H) 0.00 - 0.01 K/uL    NEUTROPHILS 89 (H) 32 - 75 %    LYMPHOCYTES 10 (L) 12 - 49 %    MONOCYTES 1 (L) 5 - 13 %    EOSINOPHILS 0 0 - 7 %    BASOPHILS 0 0 - 1 %    IMMATURE GRANULOCYTES 0 %    ABS. NEUTROPHILS 12.6 (H) 1.8 - 8.0 K/UL    ABS. LYMPHOCYTES 1.4 0.8 - 3.5 K/UL    ABS. MONOCYTES 0.1 0.0 - 1.0 K/UL    ABS. EOSINOPHILS 0.0 0.0 - 0.4 K/UL    ABS. BASOPHILS 0.0 0.0 - 0.1 K/UL    ABS. IMM.  GRANS. 0.0 K/UL    DF Smear Scanned      RBC COMMENTS [place] cells  1+       MAGNESIUM    Collection Time: 10/20/21  8:23 AM   Result Value Ref Range    Magnesium 1.8 1.6 - 2.4 mg/dL   RENAL FUNCTION PANEL    Collection Time: 10/20/21  8:23 AM   Result Value Ref Range    Sodium 144 136 - 145 mmol/L    Potassium 3.2 (L) 3.5 - 5.1 mmol/L    Chloride 113 (H) 97 - 108 mmol/L    CO2 25 21 - 32 mmol/L    Anion gap 6 5 - 15 mmol/L    Glucose 170 (H) 65 - 100 mg/dL    BUN 10 6 - 20 mg/dL    Creatinine 0.20 (L) 0.55 - 1.02 mg/dL    BUN/Creatinine ratio 50 (H) 12 - 20      GFR est AA >60 >60 ml/min/1.73m2    GFR est non-AA >60 >60 ml/min/1.73m2    Calcium 8.0 (L) 8.5 - 10.1 mg/dL    Phosphorus 1.1 (L) 2.6 - 4.7 mg/dL    Albumin 1.6 (L) 3.5 - 5.0 g/dL        XR CHEST PORT   Final Result      XR ABD (KUB)   Final Result      XR ABD PORT  1 V   Final Result      XR CHEST PORT   Final Result   FINDINGS: IMPRESSION: Single frontal view of the chest. Patient's left hand   obscures the left lung/hemithorax. Enteric tube distal tip below left hemidiaphragm. Normal heart size. Similar right perihilar and upper lobe airspace disease. No sizable pleural   effusion or pneumothorax. XR CHEST PORT   Final Result   Slightly retracted enteric tube, otherwise unchanged. XR CHEST PORT   Final Result   1. Enteric tube as above. 2. Persistent multifocal airspace disease with a cavitary lesion in the left   lateral lung. CTA CHEST W OR W WO CONT   Final Result   Negative for pulmonary embolus. Findings the lungs consistent with multilobar pneumonia with concomitant   bronchiolitis. The cavitary foci may represent pneumatoceles or potentially   septic emboli, echocardiogram recommended when clinically able. XR CHEST PORT   Final Result   1. Large right perihilar opacity, mass versus airspace disease. Further   characterization with CT is recommended. 2. Nodular opacity in the left midlung zone. Further characterization with CT is   recommended. XR NECK SOFT TISSUE   Final Result   No significant soft tissue swelling or radiodense foreign body.       XR CHEST PORT    (Results Pending)      Assessment:     Hospital Problems  Never Reviewed        Codes Class Noted POA    Severe protein[facility]calorie malnutrition ([facility] ICD-10-CM: E43  ICD-9-CM: 262  10/18/2021 Unknown        Acute respiratory failure ([facility]) ICD-10-CM: J96.00  ICD-9-CM: 518.81 10/17/2021 Unknown        Failure to thrive in adult ICD-10-CM: R62.7  ICD-9-CM: 783.7  10/17/2021 Unknown        Multifocal pneumonia ICD-10-CM: J18.9  ICD-9-CM: 486  10/17/2021 Unknown          Had aspiration, chronic, malnutrition, due to  Poor in taking, calori deficiency,    Has been on NG tube feeding since admission,  X-ray showed ileus with fecal impaction,   hypokalemia, continue on the  Plan:   Antibiotic treatment for pneumonia  Continue IV replacement  Patient will back to ICU for further evaluation,  If patient is a stable respiratory cardiovascular wise, if her power of  agree, may place a PEG tube and EGD guidance.   The indication, risks, options, limitations will be discussed in detail  Signed By: Rip Duran MD     October 20, 2021         Thank you for allowing me to participate in this patients care  Cc Referring Physician   Unknown, Provider, MD

## 2021-10-20 NOTE — ANESTHESIA PREPROCEDURE EVALUATION
Relevant Problems   RESPIRATORY SYSTEM   (+) Multifocal pneumonia       Anesthetic History   No history of anesthetic complications            Review of Systems / Medical History  Patient summary reviewed, nursing notes reviewed and pertinent labs reviewed    Pulmonary          Pneumonia      Comments: ACUTE RESPIRATORY FAILURE. ? ASPIRATION PNEUMONIA. Neuro/Psych             Comments: CEREBELLER   ATAXIA Cardiovascular  Within defined limits                     GI/Hepatic/Renal               Comments: SEVERE PROTEIN CALORIE MALNUTRITION. FAILURE TO THRIVE. Endo/Other             Other Findings   Comments: Acute respiratory failure ([address]        CT SCAN (10-17-21) IMPRESSION  Negative for pulmonary embolus.      Findings the lungs consistent with multilobar pneumonia with concomitant  bronchiolitis. The cavitary foci may represent pneumatoceles or potentially  septic emboli, echocardiogram recommended when clinically able. DNR EXCEPT INTUBATION IF NEEDED. Physical Exam    Airway            Comments: UNABLE TO FOLLOW COMMANDS Cardiovascular    Rhythm: regular  Rate: normal         Dental    Dentition: Poor dentition     Pulmonary      Decreased breath sounds          Comments: Few crackles B/L Abdominal  GI exam deferred       Other Findings            Anesthetic Plan    ASA: 3  Anesthesia type: MAC and total IV anesthesia          Induction: Intravenous  Anesthetic plan and risks discussed with: Healthcare power of  and Family      FAMILY SUSPENDED DNR DURING PROCEDURE AND IN RECOVERY PERIOD.

## 2021-10-21 NOTE — PROGRESS NOTES
Hospitalist Progress Note               Daily Progress Note: 10/21/2021      Subjective:   Patient is seen for follow-up. 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 Patient was noted to be nonverbal, chronically ill looking, contracted and in respiratory distress. Initially oxygen saturations were in the 80s. She was placed on a nonrebreather. CTA of the chest showed multi focal airspace disease and a small cavitary lesion in the left lower lobe. Patient was admitted and started on Zosyn. Family requested DNR CODE STATUS although did agree to intubation if needed    Patient seen in ICU. Resting comfortably in no respiratory distress. On room air.       Problem List:  Problem List as of 10/21/2021 Never Reviewed        Codes Class Noted - Resolved    Severe protein-calorie malnutrition ([facility] [address] ICD-10-CM: E43  ICD-9-CM: 262  10/18/2021 - Present        Acute respiratory failure ([facility] [address] ICD-10-CM: J96.00  ICD-9-CM: 518.81  10/17/2021 - Present        Failure to thrive in adult ICD-10-CM: R62.7  ICD-9-CM: 783.7  10/17/2021 - Present        Multifocal pneumonia ICD-10-CM: J18.9  ICD-9-CM: 652  10/17/2021 - Present              Medications reviewed  Current Facility-Administered Medications   Medication Dose Route Frequency    albuterol-ipratropium (DUO-NEB) 2.5 MG-0.5 MG/3 ML  3 mL Nebulization Q4H RT    sorbitoL 70 % solution 30 mL  30 mL Per G Tube DAILY    glycopyrrolate (ROBINUL) tablet 0.5 mg  0.5 mg Per G Tube BID    docusate (COLACE) 50 mg/5 mL oral liquid 100 mg  100 mg Per G Tube BID    dextrose 5% infusion  75 mL/hr IntraVENous CONTINUOUS    potassium chloride (KLOR-CON) packet for solution 20 mEq  20 mEq Per NG tube TID WITH MEALS    [Held by provider] mirtazapine (REMERON) tablet 15 mg  15 mg Oral QHS    sodium chloride (NS) flush 5-40 mL  5-40 mL IntraVENous Q8H    sodium chloride (NS) flush 5-40 mL  5-40 mL IntraVENous PRN    acetaminophen (TYLENOL) tablet 650 mg  650 mg Oral Q6H PRN    Or    acetaminophen (TYLENOL) suppository 650 mg  650 mg Rectal Q6H PRN    polyethylene glycol (MIRALAX) packet 17 g  17 g Oral DAILY PRN    ondansetron (ZOFRAN ODT) tablet 4 mg  4 mg Oral Q8H PRN    Or    ondansetron (ZOFRAN) injection 4 mg  4 mg IntraVENous Q6H PRN    enoxaparin (LOVENOX) injection 40 mg  40 mg SubCUTAneous DAILY    piperacillin-tazobactam (ZOSYN) 3.375 g in 0.9% sodium chloride (MBP/ADV) 100 mL MBP  3.375 g IntraVENous Q8H       Review of Systems:   Review of systems not obtained due to patient factors. Objective:   Physical Exam:     Visit Vitals  BP (!) 123/94 (BP 1 Location: Right leg, BP Patient Position: At rest)   Pulse 65   Temp (!) 95.9 °F (35.5 °C)   Resp 19   Ht 5' 6\" (1.676 m)   Wt 34 kg (74 lb 15.3 oz)   LMP  (LMP Unknown)   SpO2 100%   BMI 12.10 kg/m²    O2 Flow Rate (L/min): 2 l/min O2 Device: None (Room air)    Temp (24hrs), Av.7 °F (35.9 °C), Min:95.9 °F (35.5 °C), Max:97.3 °F (36.3 °C)    No intake/output data recorded. 10/19 1901 - 10/21 0700  In: 3978 [I.V.:3803]  Out: 0     General:   Unresponsive, cachectic and emaciated   Lungs:    Rhonchi bilateral   Chest wall:  No tenderness or deformity. Heart:  Regular rate and rhythm, S1, S2 normal, no murmur, click, rub or gallop. Abdomen:   Soft, non-tender. Bowel sounds normal. No masses,  No organomegaly. Extremities:  Contracted   Pulses: 2+ and symmetric all extremities. Skin: Skin color, texture, turgor normal. No rashes or lesions   Neurologic: CNII-XII intact.   Contractures of upper and lower extremities         Data Review:       Recent Days:  Recent Labs     10/21/21  0400 10/20/21  0823 10/19/21  0500   WBC 9.0 14.1* 13.6*   HGB 12.9 11.5 13.4   HCT 40.5 36.4 44.5    166 224     Recent Labs     10/21/21  0400 10/20/21  0823 10/19/21  0500   * 144 160*   K 3.6 3.2* 3.0*   * 113* 126*   CO2 29 25 28   GLU 92 170* 123*   BUN 6 10 25*   CREA <0.15* 0.20* 0.28*   CA 7.9* 8.0* 8.7   MG 1.9 1.8  --    PHOS 1.4* 1.1* 2.0*   ALB 1.8* 1.6* 2.0*     Recent Labs     10/20/21  0545   PH 7.42   PCO2 40   PO2 115*   HCO3 25       24 Hour Results:  Recent Results (from the past 24 hour(s))   RENAL FUNCTION PANEL    Collection Time: 10/21/21  4:00 AM   Result Value Ref Range    Sodium 147 (H) 136 - 145 mmol/L    Potassium 3.6 3.5 - 5.1 mmol/L    Chloride 112 (H) 97 - 108 mmol/L    CO2 29 21 - 32 mmol/L    Anion gap 6 5 - 15 mmol/L    Glucose 92 65 - 100 mg/dL    BUN 6 6 - 20 mg/dL    Creatinine <0.15 (L) 0.55 - 1.02 mg/dL    BUN/Creatinine ratio Not calculated 12 - 20      GFR est AA Not calculated >60 ml/min/1.73m2    GFR est non-AA Not calculated >60 ml/min/1.73m2    Calcium 7.9 (L) 8.5 - 10.1 mg/dL    Phosphorus 1.4 (L) 2.6 - 4.7 mg/dL    Albumin 1.8 (L) 3.5 - 5.0 g/dL   MAGNESIUM    Collection Time: 10/21/21  4:00 AM   Result Value Ref Range    Magnesium 1.9 1.6 - 2.4 mg/dL   CBC WITH AUTOMATED DIFF    Collection Time: 10/21/21  4:00 AM   Result Value Ref Range    WBC 9.0 3.6 - 11.0 K/uL    RBC 4.61 3.80 - 5.20 M/uL    HGB 12.9 11.5 - 16.0 g/dL    HCT 40.5 35.0 - 47.0 %    MCV 87.9 80.0 - 99.0 FL    MCH 28.0 26.0 - 34.0 PG    MCHC 31.9 30.0 - 36.5 g/dL    RDW 13.5 11.5 - 14.5 %    PLATELET 391 633 - 902 K/uL    MPV 10.8 8.9 - 12.9 FL    NRBC 0.2 (H) 0.0  WBC    ABSOLUTE NRBC 0.02 (H) 0.00 - 0.01 K/uL    NEUTROPHILS 77 (H) 32 - 75 %    LYMPHOCYTES 19 12 - 49 %    MONOCYTES 3 (L) 5 - 13 %    EOSINOPHILS 0 0 - 7 %    BASOPHILS 0 0 - 1 %    IMMATURE GRANULOCYTES 1 (H) 0 - 0.5 %    ABS. NEUTROPHILS 6.9 1.8 - 8.0 K/UL    ABS. LYMPHOCYTES 1.7 0.8 - 3.5 K/UL    ABS. MONOCYTES 0.3 0.0 - 1.0 K/UL    ABS. EOSINOPHILS 0.0 0.0 - 0.4 K/UL    ABS. BASOPHILS 0.0 0.0 - 0.1 K/UL    ABS. IMM.  GRANS. 0.1 (H) 0.00 - 0.04 K/UL    DF AUTOMATED         XR CHEST PORT   Final Result      XR CHEST PORT   Final Result   Feeding tube as above. No significant interval change. XR CHEST PORT   Final Result   FINDINGS: IMPRESSION: Single frontal view of the abdomen. Enteric tube passes below the left hemidiaphragm, coiled in the gastric fundus   region. Normal heart size. Increased hypoinflation and bilateral pulmonary airspace pneumonia and/or   atelectasis. Cavitary lesion left lower lobe as previous. No large pleural   effusion or pneumothorax. No free air under the diaphragm. XR CHEST PORT   Final Result      XR ABD (KUB)   Final Result      XR ABD PORT  1 V   Final Result      XR CHEST PORT   Final Result   FINDINGS: IMPRESSION: Single frontal view of the chest. Patient's left hand   obscures the left lung/hemithorax. Enteric tube distal tip below left hemidiaphragm. Normal heart size. Similar right perihilar and upper lobe airspace disease. No sizable pleural   effusion or pneumothorax. XR CHEST PORT   Final Result   Slightly retracted enteric tube, otherwise unchanged. XR CHEST PORT   Final Result   1. Enteric tube as above. 2. Persistent multifocal airspace disease with a cavitary lesion in the left   lateral lung. CTA CHEST W OR W WO CONT   Final Result   Negative for pulmonary embolus. Findings the lungs consistent with multilobar pneumonia with concomitant   bronchiolitis. The cavitary foci may represent pneumatoceles or potentially   septic emboli, echocardiogram recommended when clinically able. XR CHEST PORT   Final Result   1. Large right perihilar opacity, mass versus airspace disease. Further   characterization with CT is recommended. 2. Nodular opacity in the left midlung zone. Further characterization with CT is   recommended. XR NECK SOFT TISSUE   Final Result   No significant soft tissue swelling or radiodense foreign body.       XR CHEST PORT    (Results Pending)        Assessment:  Multifocal bilateral pneumonia with cavitary lesion left lower lobe    Acute respiratory failure with hypoxia    Hypernatremia due to dehydration    Sepsis with tachycardia and elevated lactate. Present on admission    Hypokalemia    Possible complicated UTI    Hereditary spinocerebellar ataxia with chronic debilitation    Adult failure to thrive    Severe protein calorie malnutrition    Severe constipation      Plan:  Continue supportive care including IV zosyn/vancomycin  Monitor routine electrolytes  Replete potassium  Continue NG tube feeds  Transfer to medical tele floor    Prognosis appears guarded    Care Plan discussed with: Nurse     Dr Ernesto Wooten updated her cousin Steven who is legal next of kin. Discussed her very poor condition, severe malnutrition and severe pneumonia. Recommended she consider palliative care but she wants to continue being very aggressive. She is requesting a PEG tube. Tube to be considered when patient more stable    Disposition: Continued inpatient care    Total time spent with patient: 30 minutes.     Pablo Reece MD

## 2021-10-21 NOTE — PROGRESS NOTES
CM reviewed clinical chart. Patient is still being actively treated by gastroenterology, internal medicine, and pulmonology. CM team will continue to follow for any needs at discharge.

## 2021-10-21 NOTE — PROGRESS NOTES
Pulmonary, Critical Care Note    Name: Gordon Sevilla MRN: [id number]   : 1985 Hospital: [address] [place] [address]   Date: 10/21/2021  Admission date: 10/17/2021 Hospital Day: 5       Subjective/Interval History:   Patient seen on the medical floor. Has best I can understand she is bedbound nonverbal but does eat orally. In any event she has had gurgling respirations were brought to the emergency room has bilateral pneumonia right base greater than left base with a cavitary area in the left lower lung pleural base. She is normally [facility] at [facility]  10/19 now in the ICU on room air. Apparently had worsening respiratory status last evening and had desaturation was transferred to the ICU has had frequent oral suctioning and is now back on room air  10/20 NG tube in place currently on room air saturation 98% looks at voice otherwise unresponsive    Hospital Problems  Never Reviewed        Codes Class Noted POA    Severe protein-calorie malnutrition ([facility] [address] ICD-10-CM: E43  ICD-9-CM: 454  10/18/2021 Unknown        Acute respiratory failure ([facility] [address] ICD-10-CM: J96.00  ICD-9-CM: 518.81  10/17/2021 Unknown        Failure to thrive in adult ICD-10-CM: R62.7  ICD-9-CM: 783.7  10/17/2021 Unknown        Multifocal pneumonia ICD-10-CM: J18.9  ICD-9-CM: 486  10/17/2021 Unknown              IMPRESSION:   1. Acute hypoxic respiratory failure back on room air  2. Aspiration pneumonia chest x-ray improving  3. Pharyngeal dysphagia  4. Possible gastroparesis with reflux  5. Fecal impaction with ileus  6. Ileus has bowel sounds  7. Altered mental status  8. Spinocerebellar ataxia  9. Severe hypernatremia labs pending  10. Severe hypokalemia labs pending  11. Severe malnutrition  Body mass index is 12.1 kg/m². RECOMMENDATIONS/PLAN:   1. Leave on room air NG tube in place  2. Maintain n.p.o. status. 3. Will reconsult speech  4. I think she needs a PEG tube  5.  Continue IV D5W to correct hypernatremia decrease rate  6. Abdominal x-ray to me suggest ileus with fecal impaction          [x] High complexity decision making was performed  [x] See my orders for details      Subjective/Initial History:     I was asked by Laura Ramsey MD to see Skylar Conklin  a 39 y.o.  female in consultation for a chief complaint of acute hypoxic respiratory failure aspiration pneumonia with cavitation        No Known Allergies     MAR reviewed and pertinent medications noted or modified as needed     Current Facility-Administered Medications   Medication    sorbitoL 70 % solution 30 mL    glycopyrrolate (ROBINUL) tablet 0.5 mg    docusate (COLACE) 50 mg/5 mL oral liquid 100 mg    dextrose 5% infusion    potassium chloride (KLOR-CON) packet for solution 20 mEq    [Held by provider] mirtazapine (REMERON) tablet 15 mg    sodium chloride (NS) flush 5-40 mL    sodium chloride (NS) flush 5-40 mL    acetaminophen (TYLENOL) tablet 650 mg    Or    acetaminophen (TYLENOL) suppository 650 mg    polyethylene glycol (MIRALAX) packet 17 g    ondansetron (ZOFRAN ODT) tablet 4 mg    Or    ondansetron (ZOFRAN) injection 4 mg    enoxaparin (LOVENOX) injection 40 mg    piperacillin-tazobactam (ZOSYN) 3.375 g in 0.9% sodium chloride (MBP/ADV) 100 mL MBP      Patient PCP: Unknown, Provider, MD  PMH:  has a past medical history of Cerebellar ataxia ([address] PSH:   has a past surgical history that includes pr breast surgery procedure unlisted. FHX: family history is not on file. SHX:  reports that she has never smoked. She has never used smokeless tobacco. She reports that she does not drink alcohol and does not use drugs.   Systemic review unobtainable as the patient is nonverbal      Objective:     Vital Signs: Telemetry:    normal sinus rhythm Intake/Output:   Visit Vitals  BP (!) 128/98 (BP 1 Location: Right leg, BP Patient Position: At rest)   Pulse 72   Temp (!) 96.6 °F (35.9 °C)   Resp 21   Ht 5' 6\" (1.676 m)   Wt 34 kg (74 lb 15.3 oz)   LMP  (LMP Unknown)   SpO2 99%   BMI 12.10 kg/m²       Temp (24hrs), Av.9 °F (36.1 °C), Min:96.4 °F (35.8 °C), Max:97.3 °F (36.3 °C)        O2 Device: None (Room air) O2 Flow Rate (L/min): 2 l/min       Wt Readings from Last 4 Encounters:   10/21/21 34 kg (74 lb 15.3 oz)          Intake/Output Summary (Last 24 hours) at 10/21/2021 0814  Last data filed at 10/21/2021 0634  Gross per 24 hour   Intake 2128 ml   Output    Net 2128 ml       Last shift:      No intake/output data recorded. Last 3 shifts: 10/19 1901 - 10/21 0700  In: 3978 [I.V.:3803]  Out: 0        Physical Exam:   General:  female; in bed profound malnutrition no upper airway noise  HEENT: NCAT, poor dentition, lips and mucosa dry  Eyes: anicteric; conjunctiva clear random eye movement present  Neck: no nodes, no JVD, no accessory MM use  Chest: no deformity,   Cardiac: Regular rate and rhythm  Lungs: No upper airway noise today has a few rales toward the bases very shallow abrasions  Abd: Thin emaciated bowel sounds present  Ext: no edema; no joint swelling;  No clubbing  : clear urine  Neuro: Eyes are open does not look at voice has some random eye movement contracted extremities  Psych-nonverbal unable to assess  Skin: warm, dry, no cyanosis;   Pulses: Brachial radial femoral pulses intact  Capillary: Normal capillary refill    Labs:    Recent Labs     10/21/21  0400 10/20/21  0823 10/19/21  0500   WBC 9.0 14.1* 13.6*   HGB 12.9 11.5 13.4    166 224     Recent Labs     10/21/21  0400 10/20/21  0823 10/19/21  0500 10/18/21  1358 10/18/21  1358   * 144 160*  --   --    K 3.6 3.2* 3.0*   < > 4.3   * 113* 126*  --   --    CO2 29 25 28  --   --    GLU 92 170* 123*  --   --    BUN 6 10 25*  --   --    CREA <0.15* 0.20* 0.28*  --   --    CA 7.9* 8.0* 8.7  --   --    MG 1.9 1.8  --   --   --    PHOS 1.4* 1.1* 2.0*  --   --    LAC  --   --   --   --  1.1   ALB 1.8* 1.6* 2.0*  --   --     < > = values in this interval not displayed. 10/20 on 2 L nasal oxygen PO2 115 PCO2 40 pH 7.42  10/20 room air oxygen saturation 98%     Nasal MRSA smear negative  Blood 1 of 4 bottles with gram-positive cocci  Urine culture no growth  COVID-19 antigen negative  Procalcitonin 2.21  Lab Results   Component Value Date/Time    Culture result: No significant growth, <10,000 CFU/mL 10/18/2021 11:15 AM    Culture result: (A) 10/17/2021 05:30 PM     Gram Positive Cocci CALLED TO AND READ BACK BY HOME Marquez R.N. 1030 10/19/2021 BY DPW    Culture result:  10/17/2021 05:30 PM     Staphylococcus species, coagulase negative growing in 1 of 4 bottles drawn NO SITE     Imaging:    CXR Results  (Last 48 hours)               10/19/21 0333  XR CHEST PORT Final result    Impression:  FINDINGS: IMPRESSION: Single frontal view of the chest. Patient's left hand   obscures the left lung/hemithorax. Enteric tube distal tip below left hemidiaphragm. Normal heart size. Similar right perihilar and upper lobe airspace disease. No sizable pleural   effusion or pneumothorax. To me I agree there is right upper lobe infiltrate there is also left basilar infiltrate with obscuration of the hemidiaphragm       Narrative:  Aspiration pneumonia. Comparison chest x-ray 10/18/2021.           10/18/21 2115  XR CHEST PORT Final result    Impression:  Slightly retracted enteric tube, otherwise unchanged. Narrative:  AP portable chest, 2108 hours. Comparison: Earlier the same day at 2009 hours. Findings: The enteric tube has been retracted slightly. The tip is in the region   of the gastric fundus. The sidehole remains below the diaphragm. Cardiac   monitoring leads overlie the chest.       The heart is normal in size. Multifocal airspace disease and a left-sided   cavitary lesion are again noted. There is no pleural effusion or pneumothorax. There is gaseous distention of the colon.            10/18/21 2017  XR CHEST PORT Final result    Impression:  1. Enteric tube as above. 2. Persistent multifocal airspace disease with a cavitary lesion in the left   lateral lung. Narrative:  AP portable chest, 2009 hours. Comparison: 10/17/2021. Findings: There is an enteric tube in place which coils overlying the gastric   fundus with the tip projected back towards the gastroesophageal junction. Cardiac monitoring leads overlie the chest. The heart is normal in size. There   is persistent dense consolidation in the right perihilar region. There is a   cavitary lesion laterally in the left midlung zone. No pleural effusion or   pneumothorax is identified. The osseous structures are unremarkable. There is   gaseous distention of the bowel. 10/17/21 1512  XR CHEST PORT Final result    Impression:  1. Large right perihilar opacity, mass versus airspace disease. Further   characterization with CT is recommended. 2. Nodular opacity in the left midlung zone. Further characterization with CT is   recommended. Narrative:  AP portable chest, 1500 hours. Comparison: None. Findings: Multiple cardiac monitoring leads overlie the chest. The heart is   normal in size. There is an 8.4 cm right perihilar opacity. There is a 3.7 cm   opacity in the left midlung zone. There is no pulmonary vascular congestion. No   pleural effusion or pneumothorax is identified. The osseous structures are   unremarkable. Results from [place] encounter on 10/17/21    XR CHEST PORT    Narrative  Chest, frontal view, 10/20/2021    History: NG tube retracted due to coiling. Placement. Comparison: Including chest, same day. Findings: Feeding tube tip is at the stomach. No coiling is evident. The  cardiac silhouette is stable. Lung volumes are stable.   Airspace consolidation  in the lungs most pronounced at the right perihilar region and bases are stable  as compared to the study performed earlier the same day. Known cavitary lesion  in the left lung is not well-visualized. No pleural effusion or pneumothorax is  evident. The osseous structures are stable. Impression  Feeding tube as above. No significant interval change. XR CHEST PORT    Narrative  NG tube placement. Comparison chest x-ray 10/20/2021 at 2:43 AM.    Impression  FINDINGS: IMPRESSION: Single frontal view of the abdomen. Enteric tube passes below the left hemidiaphragm, coiled in the gastric fundus  region. Normal heart size. Increased hypoinflation and bilateral pulmonary airspace pneumonia and/or  atelectasis. Cavitary lesion left lower lobe as previous. No large pleural  effusion or pneumothorax. No free air under the diaphragm. XR CHEST PORT    Narrative  Indication: Aspiration pneumonia. AP semiupright portable chest radiograph 0245 hours 10/21/2021. Comparison 20 October 2021. Multifocal bilateral airspace disease consistent with pneumonia again noted. Bilateral lung cavitary lesions better seen on prior CTA chest.  Lungs remain underexpanded particularly the left. No pneumothorax. Heart margins obscured, without overt cardiomegaly. NG tube tip over gastric fundal region. Interval gaseous distention of bowel throughout the included upper abdomen. Consider CT abdomen and pelvis correlation if clinically indicated. Results from [place] encounter on 10/17/21    CTA CHEST W OR W WO CONT    Narrative  Chest CTA    TECHNIQUE: Multiple continuous axial images were obtained from the thoracic  inlet to the upper abdomen after the uneventful administration of intravenous  contrast. Reformatted images as well as maximum intensity projection images were  obtained in the sagittal and coronal planes.       Comment on dose reduction: All CT scans at this facility are performed using  dose reduction optimization technique as appropriate to perform the exam  including the following; automated exposure control, adjustments of the mA  and/or kV according to patient size, or use of iterative reconstructed  technique. Comparison examination: Chest radiograph dated October 17, 2021    Findings:  LYMPHADENOPATHY: Mediastinal lymph nodes top normal in size, likely reactive to  the process in the lungs described below. CARDIOVASCULAR: Negative for pulmonary embolus. Heart normal in size. Thoracic  aorta normal in caliber, negative for dissection. Great vessels patent. LUNGS AND PLEURA: Tree-in-bud centrilobular micronodules diffusely throughout  the lungs with subpleural consolidation present right lower lobe and left  posterior basilar segment. Cavitary lesion with air-fluid level present left  lower lobe measuring approximately 21 mm, cavitary lesion with air-fluid level  medial segment middle lobe measuring 21 mm. INCLUDED ABDOMEN: The visualized upper abdomen images normally. CHEST WALL: No suspicious lytic or blastic lesion is identified. Impression  Negative for pulmonary embolus. Findings the lungs consistent with multilobar pneumonia with concomitant  bronchiolitis. The cavitary foci may represent pneumatoceles or potentially  septic emboli, echocardiogram recommended when clinically able. Discussion patient has gurgling respirations has pharyngeal dysphagia is chronically aspirating. Initial x-ray was suspicious for gastric distention as well very likely has gastroparesis with reflux and aspiration on that. Chest x-ray and CT show bibasilar infiltrates right greater than left consistent with aspiration and there is a cavitary area in the left lung which suggest this is a chronic process. She is on Zosyn I agree with that. Agree n.p.o. status with feeding tube. She likely needs a PEG tube  10/19 now in the ICU due to respiratory distress has a lot of upper airway noise with inability to control oral secretions.  Nurses are requesting Robinul however abdominal x-ray is suggestive of ileus with fecal impaction. Robinul could possibly make this worse. Despite this we will place on small dose Robinul. We will give enema start Colace per tube. Nasal MRSA smear and blood culture are negative we will discontinue vancomycin. Potassium to be repleted IV. Sodium remains highly elevated but is minimally improved. 10/20 respirations nonlabored today upper airway noise absent. Will decrease dose Robinul as I am worried about ileus. Abdominal x-ray still suggest fecal impaction. She received an enema yesterday we will repeat today and add sorbitol per G-tube.   Would continue NG feedings  10/21 chest x-ray shows bilateral infiltrate with cavitary lesion NG tube is in the gastric fundal region  Time of care 30 minutes    Ag Cobos MD

## 2021-10-21 NOTE — PROGRESS NOTES
Progress Note    Patient: Brandon Funk MRN: [id number]  SSN: [id number]    YOB: 1985  Age: 39 y.o. Sex: female      Admit Date: 10/17/2021    LOS: 4 days     Subjective:      Back to ICU and on non breath with stress.    Past Medical History:   Diagnosis Date    Cerebellar ataxia ([address]         Current Facility-Administered Medications:     albuterol-ipratropium (DUO-NEB) 2.5 MG-0.5 MG/3 ML, 3 mL, Nebulization, Q4H RT, Galen Damon MD    sorbitoL 70 % solution 30 mL, 30 mL, Per G Tube, DAILY, Ajay Burrows MD, 30 mL at 10/20/21 4781    glycopyrrolate (ROBINUL) tablet 0.5 mg, 0.5 mg, Per G Tube, BID, Ajay Burrows MD, 0.5 mg at 10/20/21 2156    docusate (COLACE) 50 mg/5 mL oral liquid 100 mg, 100 mg, Per G Tube, BID, Ajay Burrows MD, 100 mg at 10/20/21 1431    dextrose 5% infusion, 75 mL/hr, IntraVENous, CONTINUOUS, Ajay Burrows MD, Last Rate: 75 mL/hr at 10/21/21 0701, 75 mL/hr at 10/21/21 0701    potassium chloride (KLOR-CON) packet for solution 20 mEq, 20 mEq, Per NG tube, TID WITH MEALS, Moisés Washington MD, 20 mEq at 10/20/21 1653    [Held by provider] mirtazapine (REMERON) tablet 15 mg, 15 mg, Oral, QHS, Omero Roland MD    sodium chloride (NS) flush 5-40 mL, 5-40 mL, IntraVENous, Q8H, Omero Roland MD, 10 mL at 10/21/21 0502    sodium chloride (NS) flush 5-40 mL, 5-40 mL, IntraVENous, PRN, Omero Roland MD    acetaminophen (TYLENOL) tablet 650 mg, 650 mg, Oral, Q6H PRN **OR** acetaminophen (TYLENOL) suppository 650 mg, 650 mg, Rectal, Q6H PRN, Omero Roland MD    polyethylene glycol (MIRALAX) packet 17 g, 17 g, Oral, DAILY PRN, Omero Roland MD    ondansetron (ZOFRAN ODT) tablet 4 mg, 4 mg, Oral, Q8H PRN **OR** ondansetron (ZOFRAN) injection 4 mg, 4 mg, IntraVENous, Q6H PRN, Omero Roland MD    enoxaparin (LOVENOX) injection 40 mg, 40 mg, SubCUTAneous, DAILY, Omero Roland MD, 40 mg at 10/20/21 0928    piperacillin-tazobactam (ZOSYN) 3.375 g in 0.9% sodium chloride (MBP/ADV) 100 mL MBP, 3.375 g, IntraVENous, Q8H, Omero Roland MD, Last Rate: 25 mL/hr at 10/21/21 0817, 3.375 g at 10/21/21 0817    Objective:     Vitals:    10/21/21 0600 10/21/21 0700 10/21/21 0701 10/21/21 0819   BP: (!) 121/94  (!) 128/98 (!) 123/94   Pulse: 61  72 65   Resp: 16 21 19   Temp:  97 °F (36.1 °C) (!) 96.6 °F (35.9 °C)    SpO2: 99%  99% 100%   Weight:       Height:            Intake and Output:  Current Shift: No intake/output data recorded. Last three shifts: 10/19 1901 - 10/21 0700  In: 3978 [I.V.:3803]  Out: 0     Physical Exam:   Physical Exam  Constitutional:       Appearance: She is ill-appearing. HENT:      Head: Atraumatic. Eyes:      Extraocular Movements: Extraocular movements intact. Cardiovascular:      Rate and Rhythm: Regular rhythm. Pulmonary:      Breath sounds: Normal breath sounds. Abdominal:      General: Abdomen is flat. Bowel sounds are normal.      Tenderness: There is no abdominal tenderness. Skin:     Findings: Erythema present. Neurological:      Motor: No weakness.    Psychiatric:         Mood and Affect: Mood normal.          Lab/Data Review:  Recent Results (from the past 24 hour(s))   RENAL FUNCTION PANEL    Collection Time: 10/21/21  4:00 AM   Result Value Ref Range    Sodium 147 (H) 136 - 145 mmol/L    Potassium 3.6 3.5 - 5.1 mmol/L    Chloride 112 (H) 97 - 108 mmol/L    CO2 29 21 - 32 mmol/L    Anion gap 6 5 - 15 mmol/L    Glucose 92 65 - 100 mg/dL    BUN 6 6 - 20 mg/dL    Creatinine <0.15 (L) 0.55 - 1.02 mg/dL    BUN/Creatinine ratio Not calculated 12 - 20      GFR est AA Not calculated >60 ml/min/1.73m2    GFR est non-AA Not calculated >60 ml/min/1.73m2    Calcium 7.9 (L) 8.5 - 10.1 mg/dL    Phosphorus 1.4 (L) 2.6 - 4.7 mg/dL    Albumin 1.8 (L) 3.5 - 5.0 g/dL   MAGNESIUM    Collection Time: 10/21/21  4:00 AM   Result Value Ref Range    Magnesium 1.9 1.6 - 2.4 mg/dL   CBC WITH AUTOMATED DIFF    Collection Time: 10/21/21  4:00 AM Result Value Ref Range    WBC 9.0 3.6 - 11.0 K/uL    RBC 4.61 3.80 - 5.20 M/uL    HGB 12.9 11.5 - 16.0 g/dL    HCT 40.5 35.0 - 47.0 %    MCV 87.9 80.0 - 99.0 FL    MCH 28.0 26.0 - 34.0 PG    MCHC 31.9 30.0 - 36.5 g/dL    RDW 13.5 11.5 - 14.5 %    PLATELET 281 796 - 464 K/uL    MPV 10.8 8.9 - 12.9 FL    NRBC 0.2 (H) 0.0  WBC    ABSOLUTE NRBC 0.02 (H) 0.00 - 0.01 K/uL    NEUTROPHILS 77 (H) 32 - 75 %    LYMPHOCYTES 19 12 - 49 %    MONOCYTES 3 (L) 5 - 13 %    EOSINOPHILS 0 0 - 7 %    BASOPHILS 0 0 - 1 %    IMMATURE GRANULOCYTES 1 (H) 0 - 0.5 %    ABS. NEUTROPHILS 6.9 1.8 - 8.0 K/UL    ABS. LYMPHOCYTES 1.7 0.8 - 3.5 K/UL    ABS. MONOCYTES 0.3 0.0 - 1.0 K/UL    ABS. EOSINOPHILS 0.0 0.0 - 0.4 K/UL    ABS. BASOPHILS 0.0 0.0 - 0.1 K/UL    ABS. IMM. GRANS. 0.1 (H) 0.00 - 0.04 K/UL    DF AUTOMATED          XR CHEST PORT   Final Result      XR CHEST PORT   Final Result   Feeding tube as above. No significant interval change. XR CHEST PORT   Final Result   FINDINGS: IMPRESSION: Single frontal view of the abdomen. Enteric tube passes below the left hemidiaphragm, coiled in the gastric fundus   region. Normal heart size. Increased hypoinflation and bilateral pulmonary airspace pneumonia and/or   atelectasis. Cavitary lesion left lower lobe as previous. No large pleural   effusion or pneumothorax. No free air under the diaphragm. XR CHEST PORT   Final Result      XR ABD (KUB)   Final Result      XR ABD PORT  1 V   Final Result      XR CHEST PORT   Final Result   FINDINGS: IMPRESSION: Single frontal view of the chest. Patient's left hand   obscures the left lung/hemithorax. Enteric tube distal tip below left hemidiaphragm. Normal heart size. Similar right perihilar and upper lobe airspace disease. No sizable pleural   effusion or pneumothorax. XR CHEST PORT   Final Result   Slightly retracted enteric tube, otherwise unchanged. XR CHEST PORT   Final Result   1.  Enteric tube as above. 2. Persistent multifocal airspace disease with a cavitary lesion in the left   lateral lung. CTA CHEST W OR W WO CONT   Final Result   Negative for pulmonary embolus. Findings the lungs consistent with multilobar pneumonia with concomitant   bronchiolitis. The cavitary foci may represent pneumatoceles or potentially   septic emboli, echocardiogram recommended when clinically able. XR CHEST PORT   Final Result   1. Large right perihilar opacity, mass versus airspace disease. Further   characterization with CT is recommended. 2. Nodular opacity in the left midlung zone. Further characterization with CT is   recommended. XR NECK SOFT TISSUE   Final Result   No significant soft tissue swelling or radiodense foreign body. XR CHEST PORT    (Results Pending)        Assessment:     Active Problems:    Acute respiratory failure ([address] (10/17/2021)      Failure to thrive in adult (10/17/2021)      Multifocal pneumonia (10/17/2021)      Severe protein-calorie malnutrition ([address] (10/18/2021)    Had aspiration, chronic, malnutrition, due to  Poor in taking, calori deficiency,     Has been on NG tube feeding since admission,  X-ray showed ileus with fecal impaction,   hypokalemia, continue on the  Plan:   Antibiotic treatment for pneumonia  Continue IV replacement  Patient will back to ICU for further evaluation,  If patient is a stable respiratory cardiovascular wise, if her power of  agree, may place a PEG tube and EGD guidance but not today since she is not stable.  .             Signed By: Micaela Hermosillo MD     October 21, 2021        Thank you for allowing me to participate in this patients care  Cc Referring Physician   Unknown, Provider, MD

## 2021-10-22 NOTE — PROGRESS NOTES
Secondary line (zosyn) clamped from previous administration at 1741. Running abx at this time, will retime next dose per order.

## 2021-10-22 NOTE — PROGRESS NOTES
Vascular access note. Patient has functioning PIV and is enroute to ENDO for PEG placement. Let care nurse know I can place another PIV site if needed.

## 2021-10-22 NOTE — PROGRESS NOTES
Comprehensive Nutrition Assessment    Type and Reason for Visit: Reassess (interim)    Nutrition Recommendations/Plan:   As PEG placed and available for use,  Reinitiate Jevity 1.5 via NG at goal of 35ml/hr, continuous  Flush with 145ml q4h or per MD     Order provides 1260kcal (95%), 54g pro (108%), and 1500ml (100%)     Start thiamin 048CD and folic acid 1mg via IV     Replete lytes as prn  Obtain DAILY K, Mg, and Phos     Document TF formula, TF rate, flushes, and BM in I/O's    Nutrition Assessment:  Admitted for sudden onset of gurgling and respiratory distress witnessed by family. On 2L NC. Patient is nonverbal, chronically ill looking; MD rec'd palliative but family would like to continue care. Worsening resp status so transferred to ICU. NG placed for nutrition (10/18); plans for PEG today. TF initiated (10/18) and slowly advanced to goal. Held for transfer to ICU and goal rate resumed until held for PEG today. Major concern for refeeding syndrome- lytes continue to fluctuate. Appears no phos repletion ordered. Also noted no IV Thiamine initiated per RD recs. Labs: Hct 49.1, Na 146, BUN wnl, Cr <0.15, BG 92, K wnl, Mg wnl, Phos 1.4. Meds: D5 at 75mL/hr, rubinol, sorbitol, zosyn, docusate, KCl. Mirtazipine previously ordered, now d/c. Malnutrition Assessment:  Malnutrition Status:  Severe malnutrition    Context:  Chronic illness     Findings of the 6 clinical characteristics of malnutrition:   Energy Intake:  Unable to assess  Weight Loss:  Unable to assess     Body Fat Loss:  7 - Severe body fat loss, Triceps, Orbital, Fat overlying ribs, Buccal region   Muscle Mass Loss:  7 - Severe muscle mass loss, Calf (gastrocnemius), Clavicles (pectoralis &deltoids), Hand (interosseous), Scapula (trapezius), Thigh (quadriceps), Temples (temporalis)  Fluid Accumulation:  No significant fluid accumulation,      Estimated Daily Nutrient Needs:  Energy (kcal): 1320kcal (40kcal/kg);  Weight Used for Energy Requirements: Current  Protein (g): 50g (1.5g/kg); Weight Used for Protein Requirements: Current  Fluid (ml/day): 1500ml; Method Used for Fluid Requirements: Other (comment)    Nutrition Related Findings:  NFPE showing severe wasting throughout body. Extremely cachetic. No known N/V. Noted constipation- no BM until colace and sorbitol added. Last BM 10/21, loose. No edema. Wounds:    None       Current Nutrition Therapies:  DIET NPO  ADULT TUBE FEEDING Nasogastric; Standard with Fiber; Delivery Method: Continuous; Continuous Initial Rate (mL/hr): 10; Continuous Advance Tube Feeding: Yes; Advancement Volume (mL/hr): 10; Advancement Frequency: Q 12 hours; Continuous Goal Rate (m. .. Anthropometric Measures:  · Height:  5' 6\" (167.6 cm)  · Current Body Wt:  32.9 kg (72 lb 8.5 oz)   · Ideal Body Wt:  130 lbs:  55.8 %   · BMI Category:  Underweight (BMI less than 22) age over 72       Nutrition Diagnosis:   · Inadequate oral intake related to cognitive or neurological impairment as evidenced by BMI, severe muscle loss, severe loss of subcutaneous fat    Nutrition Interventions:   Food and/or Nutrient Delivery: Start tube feeding  Nutrition Education and Counseling: Education not indicated  Coordination of Nutrition Care: Continue to monitor while inpatient    Goals:  Pt to meet >75% of EEN via NGT within 3 days. Wt gain +0.5kg/week. Lytes WNL. Nutrition Monitoring and Evaluation:   Behavioral-Environmental Outcomes: None identified  Food/Nutrient Intake Outcomes: Vitamin/mineral intake, Enteral nutrition intake/tolerance  Physical Signs/Symptoms Outcomes: Biochemical data, Weight, Nutrition focused physical findings    Discharge Planning:     Too soon to determine     Electronically signed by Stefanie Ingram on 10/22/2021 at 12:22 PM    Contact:

## 2021-10-22 NOTE — PROGRESS NOTES
Report called to Karyn Montes RN. Rm is ready. Pt to be transported to  589 on telemetry at this time.

## 2021-10-22 NOTE — ANESTHESIA POSTPROCEDURE EVALUATION
Procedure(s):  PERCUTANEOUS ENDOSCOPIC GASTROSTOMY TUBE INSERTION  ESOPHAGOGASTRODUODENOSCOPY (EGD).     MAC    Anesthesia Post Evaluation      Multimodal analgesia: multimodal analgesia not used between 6 hours prior to anesthesia start to PACU discharge  Patient location during evaluation: bedside  Patient participation: complete - patient cannot participate  Level of consciousness: responsive to light touch  Pain score: 0  Pain management: adequate  Airway patency: patent  Anesthetic complications: no  Cardiovascular status: acceptable  Respiratory status: acceptable  Hydration status: acceptable  Post anesthesia nausea and vomiting:  none  Final Post Anesthesia Temperature Assessment:  Normothermia (36.0-37.5 degrees C)      INITIAL Post-op Vital signs:   Vitals Value Taken Time   /75 10/22/21 1613   Temp 36 °C (96.8 °F) 10/22/21 1613   Pulse 69 10/22/21 1613   Resp 8 10/22/21 1613   SpO2 100 % 10/22/21 1613

## 2021-10-22 NOTE — PROGRESS NOTES
Pt transported to endoscopy for PEG placement. Handoff given to Vinny Frost RN. Will notify [facility] nurse Alberto Camarena.

## 2021-10-22 NOTE — PROGRESS NOTES
Pulmonary, Critical Care Note    Name: Gordon Sevilla MRN: [id number]   : 1985 Hospital: [address] [place] [address]   Date: 10/22/2021  Admission date: 10/17/2021 Hospital Day: 6       Subjective/Interval History:   Patient seen on the medical floor. Has best I can understand she is bedbound nonverbal but does eat orally. In any event she has had gurgling respirations were brought to the emergency room has bilateral pneumonia right base greater than left base with a cavitary area in the left lower lung pleural base. She is normally followed at [facility]  10/19 now in the ICU on room air. Apparently had worsening respiratory status last evening and had desaturation was transferred to the ICU has had frequent oral suctioning and is now back on room air  10/20 NG tube in place currently on room air saturation 98% looks at voice otherwise unresponsive    Hospital Problems  Never Reviewed        Codes Class Noted POA    Severe protein-calorie malnutrition ([facility] [address] ICD-10-CM: E43  ICD-9-CM: 830  10/18/2021 Unknown        Acute respiratory failure ([facility] [address] ICD-10-CM: J96.00  ICD-9-CM: 518.81  10/17/2021 Unknown        Failure to thrive in adult ICD-10-CM: R62.7  ICD-9-CM: 783.7  10/17/2021 Unknown        Multifocal pneumonia ICD-10-CM: J18.9  ICD-9-CM: 486  10/17/2021 Unknown              IMPRESSION:   1. Acute hypoxic respiratory failure back on room air  2. Aspiration pneumonia chest x-ray improving  3. Pharyngeal dysphagia  4. Possible gastroparesis with reflux  5. Fecal impaction with ileus  6. Ileus has bowel sounds  7. Altered mental status  8. Spinocerebellar ataxia  9. Severe hypernatremia labs pending  10. Severe hypokalemia labs pending  11. Severe malnutrition  Body mass index is 12.1 kg/m². RECOMMENDATIONS/PLAN:   1. Yesterday patient was having more shortness of breath hypoxic episode received Solu-Medrol chest x-ray done condition improved she is back on room air saturation 100%  2.  Leave on room air NG tube in place for EGD possible today  3. Maintain n.p.o. status. 4. Will reconsult speech  5. I think she needs a PEG tube  6. Continue IV D5W to correct hypernatremia decrease rate  7. Abdominal x-ray to me suggest ileus with fecal impaction          [x] High complexity decision making was performed  [x] See my orders for details      Subjective/Initial History:     I was asked by Jama Aldridge MD to see Sundar Badillo  a 39 y.o.  female in consultation for a chief complaint of acute hypoxic respiratory failure aspiration pneumonia with cavitation        No Known Allergies     MAR reviewed and pertinent medications noted or modified as needed     Current Facility-Administered Medications   Medication    albuterol-ipratropium (DUO-NEB) 2.5 MG-0.5 MG/3 ML    albuterol-ipratropium (DUO-NEB) 2.5 MG-0.5 MG/3 ML    sorbitoL 70 % solution 30 mL    glycopyrrolate (ROBINUL) tablet 0.5 mg    docusate (COLACE) 50 mg/5 mL oral liquid 100 mg    dextrose 5% infusion    potassium chloride (KLOR-CON) packet for solution 20 mEq    [Held by provider] mirtazapine (REMERON) tablet 15 mg    sodium chloride (NS) flush 5-40 mL    sodium chloride (NS) flush 5-40 mL    acetaminophen (TYLENOL) tablet 650 mg    Or    acetaminophen (TYLENOL) suppository 650 mg    polyethylene glycol (MIRALAX) packet 17 g    ondansetron (ZOFRAN ODT) tablet 4 mg    Or    ondansetron (ZOFRAN) injection 4 mg    enoxaparin (LOVENOX) injection 40 mg    piperacillin-tazobactam (ZOSYN) 3.375 g in 0.9% sodium chloride (MBP/ADV) 100 mL MBP      Patient PCP: Unknown, Provider, MD  PMH:  has a past medical history of Cerebellar ataxia ([address] PSH:   has a past surgical history that includes pr breast surgery procedure unlisted. FHX: family history is not on file. SHX:  reports that she has never smoked. She has never used smokeless tobacco. She reports that she does not drink alcohol and does not use drugs.   Systemic review unobtainable as the patient is nonverbal      Objective:     Vital Signs: Telemetry:    normal sinus rhythm Intake/Output:   Visit Vitals  /88   Pulse 62   Temp 97.4 °F (36.3 °C)   Resp 14   Ht 5' 6\" (1.676 m)   Wt 34 kg (74 lb 15.3 oz)   LMP  (LMP Unknown)   SpO2 96%   BMI 12.10 kg/m²       Temp (24hrs), Av.8 °F (36 °C), Min:95.9 °F (35.5 °C), Max:97.4 °F (36.3 °C)        O2 Device: None (Room air) O2 Flow Rate (L/min): 1 l/min       Wt Readings from Last 4 Encounters:   10/21/21 34 kg (74 lb 15.3 oz)          Intake/Output Summary (Last 24 hours) at 10/22/2021 0829  Last data filed at 10/21/2021 1900  Gross per 24 hour   Intake 1435.75 ml   Output    Net 1435.75 ml       Last shift:      No intake/output data recorded. Last 3 shifts: 10/20 190 - 10/22 0700  In: 2403.3 [I.V.:.3]  Out: -        Physical Exam:   General:  female; in bed profound malnutrition no upper airway noise  HEENT: NCAT, poor dentition, lips and mucosa dry  Eyes: anicteric; conjunctiva clear random eye movement present  Neck: no nodes, no JVD, no accessory MM use  Chest: no deformity,   Cardiac: Regular rate and rhythm  Lungs: No upper airway noise today has a few rales toward the bases very shallow abrasions  Abd: Thin emaciated bowel sounds present  Ext: no edema; no joint swelling;  No clubbing  : clear urine  Neuro: Eyes are open does not look at voice has some random eye movement contracted extremities  Psych-nonverbal unable to assess  Skin: warm, dry, no cyanosis;   Pulses: Brachial radial femoral pulses intact  Capillary: Normal capillary refill    Labs:    Recent Labs     10/21/21  0400 10/20/21  0823   WBC 9.0 14.1*   HGB 12.9 11.5    166     Recent Labs     10/21/21  0400 10/20/21  0823   * 144   K 3.6 3.2*   * 113*   CO2 29 25   GLU 92 170*   BUN 6 10   CREA <0.15* 0.20*   CA 7.9* 8.0*   MG 1.9 1.8   PHOS 1.4* 1.1*   ALB 1.8* 1.6*   10/20 on 2 L nasal oxygen PO2 115 PCO2 40 pH 7.42  10/20 room air oxygen saturation 98%     Nasal MRSA smear negative  Blood 1 of 4 bottles with gram-positive cocci  Urine culture no growth  COVID-19 antigen negative  Procalcitonin 2.21  Lab Results   Component Value Date/Time    Culture result: No significant growth, <10,000 CFU/mL 10/18/2021 11:15 AM    Culture result: (A) 10/17/2021 05:30 PM     Gram Positive Cocci CALLED TO AND READ BACK BY HOME [place] RRosetteNRosette 1030 10/19/2021 BY DPW    Culture result:  10/17/2021 05:30 PM     Staphylococcus species, coagulase negative growing in 1 of 4 bottles drawn NO SITE     Imaging:    CXR Results  (Last 48 hours)               10/19/21 0333  XR CHEST PORT Final result    Impression:  FINDINGS: IMPRESSION: Single frontal view of the chest. Patient's left hand   obscures the left lung/hemithorax. Enteric tube distal tip below left hemidiaphragm. Normal heart size. Similar right perihilar and upper lobe airspace disease. No sizable pleural   effusion or pneumothorax. To me I agree there is right upper lobe infiltrate there is also left basilar infiltrate with obscuration of the hemidiaphragm       Narrative:  Aspiration pneumonia. Comparison chest x-ray 10/18/2021.           10/18/21 2115  XR CHEST PORT Final result    Impression:  Slightly retracted enteric tube, otherwise unchanged. Narrative:  AP portable chest, 2108 hours. Comparison: Earlier the same day at 2009 hours. Findings: The enteric tube has been retracted slightly. The tip is in the region   of the gastric fundus. The sidehole remains below the diaphragm. Cardiac   monitoring leads overlie the chest.       The heart is normal in size. Multifocal airspace disease and a left-sided   cavitary lesion are again noted. There is no pleural effusion or pneumothorax. There is gaseous distention of the colon. 10/18/21 2017  XR CHEST PORT Final result    Impression:  1. Enteric tube as above.    2. Persistent multifocal airspace disease with a cavitary lesion in the left   lateral lung. Narrative:  AP portable chest, 2009 hours. Comparison: 10/17/2021. Findings: There is an enteric tube in place which coils overlying the gastric   fundus with the tip projected back towards the gastroesophageal junction. Cardiac monitoring leads overlie the chest. The heart is normal in size. There   is persistent dense consolidation in the right perihilar region. There is a   cavitary lesion laterally in the left midlung zone. No pleural effusion or   pneumothorax is identified. The osseous structures are unremarkable. There is   gaseous distention of the bowel. 10/17/21 1512  XR CHEST PORT Final result    Impression:  1. Large right perihilar opacity, mass versus airspace disease. Further   characterization with CT is recommended. 2. Nodular opacity in the left midlung zone. Further characterization with CT is   recommended. Narrative:  AP portable chest, 1500 hours. Comparison: None. Findings: Multiple cardiac monitoring leads overlie the chest. The heart is   normal in size. There is an 8.4 cm right perihilar opacity. There is a 3.7 cm   opacity in the left midlung zone. There is no pulmonary vascular congestion. No   pleural effusion or pneumothorax is identified. The osseous structures are   unremarkable. Results from [place] encounter on 10/17/21    XR CHEST PORT    Narrative  1 view at 5927    NG tube remains    Essentially clear lungs. No effusion or pneumothorax. Normal heart and stable  mediastinum. Diffuse bowel distention again evident      XR CHEST PORT    Narrative  Chest, frontal view, 10/20/2021    History: NG tube retracted due to coiling. Placement. Comparison: Including chest, same day. Findings: Feeding tube tip is at the stomach. No coiling is evident. The  cardiac silhouette is stable. Lung volumes are stable.   Airspace consolidation  in the lungs most pronounced at the right perihilar region and bases are stable  as compared to the study performed earlier the same day. Known cavitary lesion  in the left lung is not well-visualized. No pleural effusion or pneumothorax is  evident. The osseous structures are stable. Impression  Feeding tube as above. No significant interval change. XR CHEST PORT    Narrative  NG tube placement. Comparison chest x-ray 10/20/2021 at 2:43 AM.    Impression  FINDINGS: IMPRESSION: Single frontal view of the abdomen. Enteric tube passes below the left hemidiaphragm, coiled in the gastric fundus  region. Normal heart size. Increased hypoinflation and bilateral pulmonary airspace pneumonia and/or  atelectasis. Cavitary lesion left lower lobe as previous. No large pleural  effusion or pneumothorax. No free air under the diaphragm. Results from [place] encounter on 10/17/21    CTA CHEST W OR W WO CONT    Narrative  Chest CTA    TECHNIQUE: Multiple continuous axial images were obtained from the thoracic  inlet to the upper abdomen after the uneventful administration of intravenous  contrast. Reformatted images as well as maximum intensity projection images were  obtained in the sagittal and coronal planes. Comment on dose reduction: All CT scans at this facility are performed using  dose reduction optimization technique as appropriate to perform the exam  including the following; automated exposure control, adjustments of the mA  and/or kV according to patient size, or use of iterative reconstructed  technique. Comparison examination: Chest radiograph dated October 17, 2021    Findings:  LYMPHADENOPATHY: Mediastinal lymph nodes top normal in size, likely reactive to  the process in the lungs described below. CARDIOVASCULAR: Negative for pulmonary embolus. Heart normal in size. Thoracic  aorta normal in caliber, negative for dissection.  Great vessels patent. LUNGS AND PLEURA: Tree-in-bud centrilobular micronodules diffusely throughout  the lungs with subpleural consolidation present right lower lobe and left  posterior basilar segment. Cavitary lesion with air-fluid level present left  lower lobe measuring approximately 21 mm, cavitary lesion with air-fluid level  medial segment middle lobe measuring 21 mm. INCLUDED ABDOMEN: The visualized upper abdomen images normally. CHEST WALL: No suspicious lytic or blastic lesion is identified. Impression  Negative for pulmonary embolus. Findings the lungs consistent with multilobar pneumonia with concomitant  bronchiolitis. The cavitary foci may represent pneumatoceles or potentially  septic emboli, echocardiogram recommended when clinically able. Discussion patient has gurgling respirations has pharyngeal dysphagia is chronically aspirating. Initial x-ray was suspicious for gastric distention as well very likely has gastroparesis with reflux and aspiration on that. Chest x-ray and CT show bibasilar infiltrates right greater than left consistent with aspiration and there is a cavitary area in the left lung which suggest this is a chronic process. She is on Zosyn I agree with that. Agree n.p.o. status with feeding tube. She likely needs a PEG tube  10/19 now in the ICU due to respiratory distress has a lot of upper airway noise with inability to control oral secretions. Nurses are requesting Robinul however abdominal x-ray is suggestive of ileus with fecal impaction. Robinul could possibly make this worse. Despite this we will place on small dose Robinul. We will give enema start Colace per tube. Nasal MRSA smear and blood culture are negative we will discontinue vancomycin. Potassium to be repleted IV. Sodium remains highly elevated but is minimally improved. 10/20 respirations nonlabored today upper airway noise absent. Will decrease dose Robinul as I am worried about ileus.   Abdominal x-ray still suggest fecal impaction. She received an enema yesterday we will repeat today and add sorbitol per G-tube.   Would continue NG feedings  10/21 chest x-ray shows bilateral infiltrate with cavitary lesion NG tube is in the gastric fundal region  10/22 condition stable this morning saturation 100% on room air  Time of care 30 minutes    Fox Wakefield MD

## 2021-10-22 NOTE — PROGRESS NOTES
Spoke with Dr. Fairy Duane via phone. Hold lovenox for today for PEG placement and pt is okay for meds down NGT with small amounts of water.

## 2021-10-22 NOTE — ANESTHESIA PREPROCEDURE EVALUATION
Relevant Problems   RESPIRATORY SYSTEM   (+) Multifocal pneumonia       Anesthesia Evaluation         Anesthetic Plan    ASA: 3                  Please see my preop note dated 10-20-21

## 2021-10-22 NOTE — ROUTINE PROCESS
TRANSFER - OUT REPORT:    Verbal report given to Valerie(name) on Flor Lara  being transferred to 5W unit) for routine post - op       Report consisted of patients Situation, Background, Assessment and   Recommendations(SBAR). Information from the following report(s) SBAR, Kardex, Procedure Summary, Cardiac Rhythm NSR and Procedure Verification was reviewed with the receiving nurse. Opportunity for questions and clarification was provided.       Patient transported with:   Monitor  Tech  Transporter

## 2021-10-22 NOTE — PROGRESS NOTES
Hospitalist Progress Note               Daily Progress Note: 10/22/2021      Subjective:   Patient is seen for follow-up. 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 Patient was noted to be nonverbal, chronically ill looking, contracted and in respiratory distress. Initially oxygen saturations were in the 80s. She was placed on a nonrebreather. CTA of the chest showed multi focal airspace disease and a small cavitary lesion in the left lower lobe. Patient was admitted and started on Zosyn. Family requested DNR CODE STATUS although did agree to intubation if needed    Patient seen in ICU. Resting comfortably in no respiratory distress. On room air.       Problem List:  Problem List as of 10/22/2021 Never Reviewed        Codes Class Noted - Resolved    Severe protein-calorie malnutrition ([facility] [address] ICD-10-CM: E43  ICD-9-CM: 262  10/18/2021 - Present        Acute respiratory failure ([facility] [address] ICD-10-CM: J96.00  ICD-9-CM: 518.81  10/17/2021 - Present        Failure to thrive in adult ICD-10-CM: R62.7  ICD-9-CM: 783.7  10/17/2021 - Present        Multifocal pneumonia ICD-10-CM: J18.9  ICD-9-CM: 521  10/17/2021 - Present              Medications reviewed  Current Facility-Administered Medications   Medication Dose Route Frequency    albuterol-ipratropium (DUO-NEB) 2.5 MG-0.5 MG/3 ML  3 mL Nebulization Q6HWA RT    albuterol-ipratropium (DUO-NEB) 2.5 MG-0.5 MG/3 ML  3 mL Nebulization Q6H PRN    sorbitoL 70 % solution 30 mL  30 mL Per G Tube DAILY    glycopyrrolate (ROBINUL) tablet 0.5 mg  0.5 mg Per G Tube BID    docusate (COLACE) 50 mg/5 mL oral liquid 100 mg  100 mg Per G Tube BID    dextrose 5% infusion  75 mL/hr IntraVENous CONTINUOUS    potassium chloride (KLOR-CON) packet for solution 20 mEq  20 mEq Per NG tube TID WITH MEALS    [Held by provider] mirtazapine (Ricardo Loo) tablet 15 mg  15 mg Oral QHS    sodium chloride (NS) flush 5-40 mL  5-40 mL IntraVENous Q8H    sodium chloride (NS) flush 5-40 mL  5-40 mL IntraVENous PRN    acetaminophen (TYLENOL) tablet 650 mg  650 mg Oral Q6H PRN    Or    acetaminophen (TYLENOL) suppository 650 mg  650 mg Rectal Q6H PRN    polyethylene glycol (MIRALAX) packet 17 g  17 g Oral DAILY PRN    ondansetron (ZOFRAN ODT) tablet 4 mg  4 mg Oral Q8H PRN    Or    ondansetron (ZOFRAN) injection 4 mg  4 mg IntraVENous Q6H PRN    enoxaparin (LOVENOX) injection 40 mg  40 mg SubCUTAneous DAILY    piperacillin-tazobactam (ZOSYN) 3.375 g in 0.9% sodium chloride (MBP/ADV) 100 mL MBP  3.375 g IntraVENous Q8H       Review of Systems:   Review of systems not obtained due to patient factors. Objective:   Physical Exam:     Visit Vitals  BP (!) 140/87   Pulse 64   Temp 97.4 °F (36.3 °C)   Resp 15   Ht 5' 6\" (1.676 m)   Wt 34 kg (74 lb 15.3 oz)   LMP  (LMP Unknown)   SpO2 100%   BMI 12.10 kg/m²    O2 Flow Rate (L/min): 1 l/min O2 Device: None (Room air)    Temp (24hrs), Av.8 °F (36 °C), Min:95.9 °F (35.5 °C), Max:97.4 °F (36.3 °C)    No intake/output data recorded. 10/20 1901 - 10/22 0700  In: 2403.3 [I.V.:.3]  Out: -     General:   Unresponsive, cachectic and emaciated   Lungs:    Rhonchi bilateral   Chest wall:  No tenderness or deformity. Heart:  Regular rate and rhythm, S1, S2 normal, no murmur, click, rub or gallop. Abdomen:   Soft, non-tender. Bowel sounds normal. No masses,  No organomegaly. Extremities:  Contracted   Pulses: 2+ and symmetric all extremities. Skin: Skin color, texture, turgor normal. No rashes or lesions   Neurologic: CNII-XII intact.   Contractures of upper and lower extremities         Data Review:       Recent Days:  Recent Labs     10/21/21  0400 10/20/21  0823   WBC 9.0 14.1*   HGB 12.9 11.5   HCT 40.5 36.4    166     Recent Labs     10/21/21  0400 10/20/21  0823   * 144   K 3.6 3.2*   * 113* CO2 29 25   GLU 92 170*   BUN 6 10   CREA <0.15* 0.20*   CA 7.9* 8.0*   MG 1.9 1.8   PHOS 1.4* 1.1*   ALB 1.8* 1.6*     Recent Labs     10/20/21  0545   PH 7.42   PCO2 40   PO2 115*   HCO3 25       24 Hour Results:  No results found for this or any previous visit (from the past 24 hour(s)). XR CHEST PORT   Final Result      XR CHEST PORT   Final Result      XR CHEST PORT   Final Result   Feeding tube as above. No significant interval change. XR CHEST PORT   Final Result   FINDINGS: IMPRESSION: Single frontal view of the abdomen. Enteric tube passes below the left hemidiaphragm, coiled in the gastric fundus   region. Normal heart size. Increased hypoinflation and bilateral pulmonary airspace pneumonia and/or   atelectasis. Cavitary lesion left lower lobe as previous. No large pleural   effusion or pneumothorax. No free air under the diaphragm. XR CHEST PORT   Final Result      XR ABD (KUB)   Final Result      XR ABD PORT  1 V   Final Result      XR CHEST PORT   Final Result   FINDINGS: IMPRESSION: Single frontal view of the chest. Patient's left hand   obscures the left lung/hemithorax. Enteric tube distal tip below left hemidiaphragm. Normal heart size. Similar right perihilar and upper lobe airspace disease. No sizable pleural   effusion or pneumothorax. XR CHEST PORT   Final Result   Slightly retracted enteric tube, otherwise unchanged. XR CHEST PORT   Final Result   1. Enteric tube as above. 2. Persistent multifocal airspace disease with a cavitary lesion in the left   lateral lung. CTA CHEST W OR W WO CONT   Final Result   Negative for pulmonary embolus. Findings the lungs consistent with multilobar pneumonia with concomitant   bronchiolitis. The cavitary foci may represent pneumatoceles or potentially   septic emboli, echocardiogram recommended when clinically able. XR CHEST PORT   Final Result   1.  Large right perihilar opacity, mass versus airspace disease. Further   characterization with CT is recommended. 2. Nodular opacity in the left midlung zone. Further characterization with CT is   recommended. XR NECK SOFT TISSUE   Final Result   No significant soft tissue swelling or radiodense foreign body. Assessment:  Multifocal bilateral pneumonia with cavitary lesion left lower lobe    Acute respiratory failure with hypoxia    Hypernatremia due to dehydration    Sepsis with tachycardia and elevated lactate. Present on admission    Hypokalemia    Possible complicated UTI    Hereditary spinocerebellar ataxia with chronic debilitation    Adult failure to thrive    Severe protein calorie malnutrition    Severe constipation      Plan:  Continue supportive care including IV zosyn/vancomycin  Monitor routine electrolytes  Replete potassium  Continue NG tube feeds  Transfer to medical tele floor    Prognosis appears guarded    Clinical updates provided to niece, Ms Dorothea Connell. I explained to her the difficulties of attempting to place PEG. We also discussed hospice versus comfort care but she is not inclined to hospice at this time. She would like to give cousin a chance. Care Plan discussed with: Nurse         Disposition: Continued inpatient care    Total time spent with patient: 30 minutes.     Zhanna Menezes MD

## 2021-10-23 NOTE — PROGRESS NOTES
Progress Note    Patient: Silvina Encarnacion MRN: [id number]  SSN: [id number]    YOB: 1985  Age: 39 y.o. Sex: female      Admit Date: 10/17/2021    LOS: 6 days     Subjective:   Patient examined, no distress, on room air,  Start postop.   PEG tube placement yesterday  Past Medical History:   Diagnosis Date    Cerebellar ataxia ([facility] [address]         Current Facility-Administered Medications:     albuterol-ipratropium (DUO-NEB) 2.5 MG-0.5 MG/3 ML, 3 mL, Nebulization, Q6HWA RT, Galen Damon MD, 3 mL at 10/23/21 1038    albuterol-ipratropium (DUO-NEB) 2.5 MG-0.5 MG/3 ML, 3 mL, Nebulization, Q6H PRN, Galen Damon MD    sorbitoL 70 % solution 30 mL, 30 mL, Per G Tube, DAILY, Len Wallace MD, 30 mL at 10/23/21 1041    glycopyrrolate (ROBINUL) tablet 0.5 mg, 0.5 mg, Per G Tube, BID, Len Wallace MD, 0.5 mg at 10/23/21 1023    docusate (COLACE) 50 mg/5 mL oral liquid 100 mg, 100 mg, Per G Tube, BID, Len Wallace MD, 100 mg at 10/23/21 1050    dextrose 5% infusion, 75 mL/hr, IntraVENous, CONTINUOUS, Len Wallace MD, Held at 10/23/21 0244    potassium chloride (KLOR-CON) packet for solution 20 mEq, 20 mEq, Per NG tube, TID WITH MEALS, Elsa Sheets MD, 20 mEq at 10/23/21 1040    [Held by provider] mirtazapine (REMERON) tablet 15 mg, 15 mg, Oral, QHS, Omero Roland MD    sodium chloride (NS) flush 5-40 mL, 5-40 mL, IntraVENous, Q8H, Omero Roland MD, 10 mL at 10/23/21 0551    sodium chloride (NS) flush 5-40 mL, 5-40 mL, IntraVENous, PRN, Omero Roland MD    acetaminophen (TYLENOL) tablet 650 mg, 650 mg, Oral, Q6H PRN **OR** acetaminophen (TYLENOL) suppository 650 mg, 650 mg, Rectal, Q6H PRN, Omero Roland MD    polyethylene glycol (MIRALAX) packet 17 g, 17 g, Oral, DAILY PRN, Omero Roland MD    ondansetron (ZOFRAN ODT) tablet 4 mg, 4 mg, Oral, Q8H PRN **OR** ondansetron (ZOFRAN) injection 4 mg, 4 mg, IntraVENous, Q6H PRN, Omero Roland MD    enoxaparin (LOVENOX) injection 40 mg, 40 mg, SubCUTAneous, DAILY, Omero Roland MD, 40 mg at 10/23/21 1038    piperacillin-tazobactam (ZOSYN) 3.375 g in 0.9% sodium chloride (MBP/ADV) 100 mL MBP, 3.375 g, IntraVENous, Q8H, Omero Roland MD, Last Rate: 25 mL/hr at 10/23/21 0305, 3.375 g at 10/23/21 0305    Objective:     Vitals:    10/23/21 0346 10/23/21 0800 10/23/21 1019 10/23/21 1038   BP: 124/82  101/70    Pulse: 64 (!) 52 (!) 52    Resp: 16  16    Temp: 97.2 °F (36.2 °C)  (!) 96.1 °F (35.6 °C)    SpO2: 93%  93% 93%   Weight:       Height:            Intake and Output:  Current Shift: No intake/output data recorded. Last three shifts: 10/21 1901 - 10/23 0700  In: 1231.3 [I.V.:1231.3]  Out: -     Physical Exam:   Physical Exam  Constitutional:       Appearance: She is ill-appearing. HENT:      Head: Atraumatic. Eyes:      Extraocular Movements: Extraocular movements intact. Cardiovascular:      Rate and Rhythm: Regular rhythm. Pulmonary:      Breath sounds: Normal breath sounds. Abdominal:      General: Abdomen is flat. Bowel sounds are normal.      Tenderness: There is no abdominal tenderness. Skin:     Findings: Erythema present. Neurological:      Motor: No weakness. Psychiatric:         Mood and Affect: Mood normal.     PEG sites no bleeding, no drainage,    Lab/Data Review:  No results found for this or any previous visit (from the past 24 hour(s)). XR CHEST PORT   Final Result      XR CHEST PORT   Final Result      XR CHEST PORT   Final Result   Feeding tube as above. No significant interval change. XR CHEST PORT   Final Result   FINDINGS: IMPRESSION: Single frontal view of the abdomen. Enteric tube passes below the left hemidiaphragm, coiled in the gastric fundus   region. Normal heart size. Increased hypoinflation and bilateral pulmonary airspace pneumonia and/or   atelectasis. Cavitary lesion left lower lobe as previous. No large pleural   effusion or pneumothorax.       No free air under the diaphragm. XR CHEST PORT   Final Result      XR ABD (KUB)   Final Result      XR ABD PORT  1 V   Final Result      XR CHEST PORT   Final Result   FINDINGS: IMPRESSION: Single frontal view of the chest. Patient's left hand   obscures the left lung/hemithorax. Enteric tube distal tip below left hemidiaphragm. Normal heart size. Similar right perihilar and upper lobe airspace disease. No sizable pleural   effusion or pneumothorax. XR CHEST PORT   Final Result   Slightly retracted enteric tube, otherwise unchanged. XR CHEST PORT   Final Result   1. Enteric tube as above. 2. Persistent multifocal airspace disease with a cavitary lesion in the left   lateral lung. CTA CHEST W OR W WO CONT   Final Result   Negative for pulmonary embolus. Findings the lungs consistent with multilobar pneumonia with concomitant   bronchiolitis. The cavitary foci may represent pneumatoceles or potentially   septic emboli, echocardiogram recommended when clinically able. XR CHEST PORT   Final Result   1. Large right perihilar opacity, mass versus airspace disease. Further   characterization with CT is recommended. 2. Nodular opacity in the left midlung zone. Further characterization with CT is   recommended. XR NECK SOFT TISSUE   Final Result   No significant soft tissue swelling or radiodense foreign body.            Assessment:     Active Problems:    Acute respiratory failure ([address] (10/17/2021)      Failure to thrive in adult (10/17/2021)      Multifocal pneumonia (10/17/2021)      Severe protein-calorie malnutrition ([address] (10/18/2021)    Had aspiration, chronic, malnutrition, due to  Poor in taking, calori deficiency,     Has been on NG tube feeding since admission,  X-ray showed ileus with fecal impaction,    Status post peg  tube placement  Plan:   Antibiotic treatment for pneumonia  Continue IV replacement          PEG tube feeding          Nutrition follow-up for feeding rate           Signed By: Melissa Escamilla MD     October 23, 2021        Thank you for allowing me to participate in this patients care  Cc Referring Physician   Unknown, Provider, MD

## 2021-10-23 NOTE — PROGRESS NOTES
10/23/21 1549   Peripheral IV 10/23/21 Distal;Right;Upper Cephalic   Placement Date/Time: 10/23/21 1539   Number of Attempts: (c) 1  Size: (c) 20 G  Orientation: Distal;Right;Upper  Location: Cephalic   Site Assessment Clean, dry, & intact   Phlebitis Assessment 0   Infiltration Assessment 0   Dressing Status Clean, dry, & intact; New   Dressing Type Tape;Transparent   Hub Color/Line Status Pink;Flushed;Patent  (+ blood return)   Alcohol Cap Used Yes      Patient w/severe Upper Ext. Contractions  and available vascular access is depleted.

## 2021-10-23 NOTE — PROGRESS NOTES
Hospitalist Progress Note Daily Progress Note: 10/23/2021 Subjective:  
Patient is seen for follow-up. 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 Patient was noted to be nonverbal, chronically ill looking, contracted and in respiratory distress. Initially oxygen saturations were in the 80s. She was placed on a nonrebreather. CTA of the chest showed multi focal airspace disease and a small cavitary lesion in the left lower lobe. Patient was admitted and started on Zosyn. Family requested DNR CODE STATUS although did agree to intubation if needed Patient seen in ICU. Resting comfortably in no respiratory distress. On room air. Problem List: 
Problem List as of 10/23/2021 Never Reviewed Codes Class Noted - Resolved Severe protein-calorie malnutrition ([facility] [address] ICD-10-CM: N91 ICD-9-CM: 884  10/18/2021 - Present Acute respiratory failure ([facility] [address] ICD-10-CM: J96.00 
ICD-9-CM: 518.81  10/17/2021 - Present Failure to thrive in adult ICD-10-CM: R62.7 ICD-9-CM: 783.7  10/17/2021 - Present Multifocal pneumonia ICD-10-CM: J18.9 ICD-9-CM: 444  10/17/2021 - Present Medications reviewed Current Facility-Administered Medications Medication Dose Route Frequency  albuterol-ipratropium (DUO-NEB) 2.5 MG-0.5 MG/3 ML  3 mL Nebulization Q6HWA RT  
 albuterol-ipratropium (DUO-NEB) 2.5 MG-0.5 MG/3 ML  3 mL Nebulization Q6H PRN  
 sorbitoL 70 % solution 30 mL  30 mL Per G Tube DAILY  glycopyrrolate (ROBINUL) tablet 0.5 mg  0.5 mg Per G Tube BID  docusate (COLACE) 50 mg/5 mL oral liquid 100 mg  100 mg Per G Tube BID  dextrose 5% infusion  75 mL/hr IntraVENous CONTINUOUS  
 potassium chloride (KLOR-CON) packet for solution 20 mEq  20 mEq Per NG tube TID WITH MEALS  
 [Held by provider] mirtazapine ([place]) tablet 15 mg  15 mg Oral QHS  sodium chloride (NS) flush 5-40 mL  5-40 mL IntraVENous Q8H  
 sodium chloride (NS) flush 5-40 mL  5-40 mL IntraVENous PRN  
 acetaminophen (TYLENOL) tablet 650 mg  650 mg Oral Q6H PRN Or  
 acetaminophen (TYLENOL) suppository 650 mg  650 mg Rectal Q6H PRN  polyethylene glycol (MIRALAX) packet 17 g  17 g Oral DAILY PRN  
 ondansetron (ZOFRAN ODT) tablet 4 mg  4 mg Oral Q8H PRN Or  
 ondansetron (ZOFRAN) injection 4 mg  4 mg IntraVENous Q6H PRN  
 enoxaparin (LOVENOX) injection 40 mg  40 mg SubCUTAneous DAILY  piperacillin-tazobactam (ZOSYN) 3.375 g in 0.9% sodium chloride (MBP/ADV) 100 mL MBP  3.375 g IntraVENous Q8H Review of Systems:  
Review of systems not obtained due to patient factors. Objective:  
Physical Exam:  
 
Visit Vitals /70 (BP 1 Location: Left upper arm, BP Patient Position: At rest;Supine) Pulse (!) 52 Temp (!) 96.1 °F (35.6 °C) Resp 16 Ht 5' 6\" (1.676 m) Wt 34 kg (74 lb 15.3 oz) LMP  (LMP Unknown) SpO2 93% BMI 12.10 kg/m² O2 Flow Rate (L/min): 1 l/min O2 Device: None (Room air) Temp (24hrs), Av.2 °F (36.2 °C), Min:96.1 °F (35.6 °C), Max:98.7 °F (37.1 °C) No intake/output data recorded. 10/21 1901 - 10/23 0700 In: 1231.3 [I.V.:1231.3] Out: - General:   Unresponsive, cachectic and emaciated Lungs:    Rhonchi bilateral  
Chest wall:  No tenderness or deformity. Heart:  Regular rate and rhythm, S1, S2 normal, no murmur, click, rub or gallop. Abdomen:   Soft, non-tender. Bowel sounds normal. No masses,  No organomegaly. Extremities:  Contracted Pulses: 2+ and symmetric all extremities. Skin: Skin color, texture, turgor normal. No rashes or lesions Neurologic: CNII-XII intact. Contractures of upper and lower extremities Data Review:  
   
Recent Days: 
Recent Labs 10/21/21 
0400 WBC 9.0 HGB 12.9 HCT 40.5  Recent Labs 10/21/21 
0400 * K 3.6 * CO2 29  
GLU 92 BUN 6  
CREA <0.15* CA 7.9*  
MG 1.9 PHOS 1.4* ALB 1.8* No results for input(s): PH, PCO2, PO2, HCO3, FIO2 in the last 72 hours. 24 Hour Results: No results found for this or any previous visit (from the past 24 hour(s)). XR CHEST PORT Final Result XR CHEST PORT Final Result XR CHEST PORT Final Result Feeding tube as above. No significant interval change. XR CHEST PORT Final Result FINDINGS: IMPRESSION: Single frontal view of the abdomen. Enteric tube passes below the left hemidiaphragm, coiled in the gastric fundus  
region. Normal heart size. Increased hypoinflation and bilateral pulmonary airspace pneumonia and/or  
atelectasis. Cavitary lesion left lower lobe as previous. No large pleural  
effusion or pneumothorax. No free air under the diaphragm. XR CHEST PORT Final Result XR ABD (KUB) Final Result XR ABD PORT  1 V Final Result XR CHEST PORT Final Result FINDINGS: IMPRESSION: Single frontal view of the chest. Patient's left hand  
obscures the left lung/hemithorax. Enteric tube distal tip below left hemidiaphragm. Normal heart size. Similar right perihilar and upper lobe airspace disease. No sizable pleural  
effusion or pneumothorax. XR CHEST PORT Final Result Slightly retracted enteric tube, otherwise unchanged. XR CHEST PORT Final Result 1. Enteric tube as above. 2. Persistent multifocal airspace disease with a cavitary lesion in the left  
lateral lung. CTA CHEST W OR W WO CONT Final Result Negative for pulmonary embolus. Findings the lungs consistent with multilobar pneumonia with concomitant  
bronchiolitis. The cavitary foci may represent pneumatoceles or potentially  
septic emboli, echocardiogram recommended when clinically able. XR CHEST PORT Final Result 1. Large right perihilar opacity, mass versus airspace disease. Further  
characterization with CT is recommended. 2. Nodular opacity in the left midlung zone. Further characterization with CT is  
recommended. XR NECK SOFT TISSUE Final Result No significant soft tissue swelling or radiodense foreign body. Assessment: 
Multifocal bilateral pneumonia with cavitary lesion left lower lobe Acute respiratory failure with hypoxia Hypernatremia due to dehydration Sepsis with tachycardia and elevated lactate. Present on admission Hypokalemia Possible complicated UTI Hereditary spinocerebellar ataxia with chronic debilitation Adult failure to thrive Severe protein calorie malnutrition Severe constipation Plan: 
Continue supportive care including IV zosyn/vancomycin Monitor routine electrolytes Replete potassium PEG tube placed , tolerating PEF feed. She is on room air, By Dr. Taryn Dixon. Clinical updates provided to niece, Ms Donny Waters. I explained to her the difficulties of attempting to place PEG. We also discussed hospice versus comfort care but she is not inclined to hospice at this time. She would like to give cousin a chance. I didn't get a chance to talk to the family, but its time to dc vancomycin , continue zosyn,. And dispo planning Care Plan discussed with: Nurse Disposition: Continued inpatient care Total time spent with patient: 30 minutes.  
 
Howard Dean MD

## 2021-10-23 NOTE — ROUTINE PROCESS
Received patient from OR  post PEG placement vitals stable. Dressing dry and intact. PEG tube can be used for medications after 6 hours(11 PM) tonight.

## 2021-10-23 NOTE — WOUND CARE
IP WOUND CONSULT    Renny Manriquez  MEDICAL RECORD NUMBER:  [id number]  AGE: 39 y.o. GENDER: female  : 1985  TODAY'S DATE:  10/23/2021    GENERAL     [] Follow-up   [x] New Consult    Renny Manriquez is a 39 y.o. female referred by:   [x] Physician  [] Nursing  [] Other:         PAST MEDICAL HISTORY    Past Medical History:   Diagnosis Date    Cerebellar ataxia ([address]         PAST SURGICAL HISTORY    Past Surgical History:   Procedure Laterality Date    OH BREAST SURGERY PROCEDURE UNLISTED      breast reduction       FAMILY HISTORY    History reviewed. No pertinent family history. ALLERGIES    No Known Allergies    MEDICATIONS    No current facility-administered medications on file prior to encounter. Current Outpatient Medications on File Prior to Encounter   Medication Sig Dispense Refill    baclofen (LIORESAL) 10 mg tablet Take 10 mg by mouth three (3) times daily.  carbidopa-levodopa (SINEMET)  mg per tablet Take 1 Tablet by mouth two (2) times a day.  mirtazapine (REMERON) 15 mg tablet       zolpidem (AMBIEN) 10 mg tablet  (Patient not taking: Reported on 10/18/2021)           [unfilled]  Visit Vitals  /70 (BP 1 Location: Left upper arm, BP Patient Position: At rest;Supine)   Pulse (!) 52   Temp (!) 96.1 °F (35.6 °C)   Resp 16   Ht 5' 6\" (1.676 m)   Wt 34 kg (74 lb 15.3 oz)   LMP  (LMP Unknown)   SpO2 93%   BMI 12.10 kg/m²       ASSESSMENT     Wound Identification & Type: Stage 2 PI to right greater trochanteric; Stage 2 PI to right ischial; friction / shear injury with MASD to sacrum; injury to right 1st toe, possibly pressure related or mechanical injury; scab to left 2nd toe, superficial; fluid-filled bullae to left 5th toe. Dressing change: Yes, see flow chart  Verbal consent for picture: Yes per family member.   Patient is non-verbal.      Contributing Factors: anticoagulation therapy, edema, chronic pressure, decreased mobility, shear force, incontinence of stool, incontinence of urine, malnutrition and Cachectic    Wound Leg upper Anterior;Proximal;Right reddend hip with blister present (Active)   Wound Image   10/23/21 1515   Wound Etiology Pressure Stage 2 10/23/21 1515   Cleansed Cleansed with saline 10/23/21 1515   Dressing/Treatment Honey gel/honey paste; Foam 10/23/21 1515   Dressing Change Due 10/24/21 10/23/21 1515   Wound Length (cm) 2.3 cm 10/23/21 1515   Wound Width (cm) 2 cm 10/23/21 1515   Wound Depth (cm) 0.1 cm 10/23/21 1515   Wound Surface Area (cm^2) 4.6 cm^2 10/23/21 1515   Wound Volume (cm^3) 0.46 cm^3 10/23/21 1515   Wound Assessment Pink/red 10/23/21 1515   Drainage Amount Scant 10/23/21 1515   Drainage Description Serosanguinous 10/23/21 1515   Wound Odor None 10/23/21 1515   Mandy-Wound/Incision Assessment Intact; Blanchable erythema 10/23/21 1515   Edges Flat/open edges; Unattached edges 10/23/21 1515   Wound Thickness Description Partial thickness 10/23/21 1515   Number of days: 5       Wound Buttocks Right reddend with blister present (Active)   Wound Image   10/23/21 1517   Wound Etiology Pressure Stage 2 10/23/21 1517   Dressing Status Clean;Dry; Intact 10/22/21 2257   Cleansed Cleansed with saline 10/23/21 1517   Dressing/Treatment Honey gel/honey paste; Foam 10/23/21 1517   Dressing Change Due 10/24/21 10/23/21 1517   Wound Length (cm) 3.8 cm 10/23/21 1517   Wound Width (cm) 0.9 cm 10/23/21 1517   Wound Depth (cm) 0.1 cm 10/23/21 1517   Wound Surface Area (cm^2) 3.42 cm^2 10/23/21 1517   Wound Volume (cm^3) 0.342 cm^3 10/23/21 1517   Wound Assessment Pink/red 10/23/21 1517   Drainage Amount Scant 10/23/21 1517   Drainage Description Serosanguinous 10/23/21 1517   Wound Odor None 10/23/21 1517   Mandy-Wound/Incision Assessment Intact 10/23/21 1517   Edges Unattached edges; Defined edges 10/23/21 1517   Wound Thickness Description Partial thickness 10/23/21 1517   Number of days: 5       Wound Sacral/coccyx red, nonblanchable (Active)   Wound Image   10/23/21 1520   Wound Etiology Other (Comment) 10/23/21 1520   Dressing Status Clean;Dry; Intact 10/22/21 2257   Cleansed Other (Comment) 10/23/21 1520   Dressing/Treatment Zinc paste 10/23/21 1520   Wound Assessment Dry;Pink/red 10/23/21 1520   Drainage Amount None 10/23/21 1520   Wound Odor None 10/23/21 1520   Mandy-Wound/Incision Assessment Dry/flaky; Other (Comment) 10/23/21 1520   Edges Undefined edges 10/23/21 1520   Number of days: 5       Wound Toe (Comment  which one) Anterior;Right (Active)   Wound Image   10/23/21 1509   Dressing Status New dressing applied 10/23/21 1509   Cleansed Cleansed with saline 10/23/21 1509   Dressing/Treatment Foam;Honey gel/honey paste 10/23/21 1509   Dressing Change Due 10/23/21 10/23/21 1509   Wound Length (cm) 0.4 cm 10/23/21 1509   Wound Width (cm) 0.4 cm 10/23/21 1509   Wound Depth (cm) 0.4 cm 10/23/21 1509   Wound Surface Area (cm^2) 0.16 cm^2 10/23/21 1509   Wound Volume (cm^3) 0.064 cm^3 10/23/21 1509   Wound Assessment Dry;Pink/red 10/23/21 1509   Drainage Amount None 10/23/21 1509   Wound Odor None 10/23/21 1509   Mandy-Wound/Incision Assessment Dry/flaky; Other (Comment) 10/23/21 1509   Edges Unattached edges 10/23/21 1509   Wound Thickness Description Full thickness 10/23/21 1509   Number of days: 5       Wound Toe (Comment  which one) Anterior; Left dried/ scabbed (Active)   Wound Image   10/23/21 1514   Wound Etiology Other (Comment) 10/23/21 1514   Dressing Status Clean;Dry; Intact 10/22/21 2257   Cleansed Cleansed with saline 10/23/21 1514   Dressing/Treatment Open to air 10/23/21 1514   Wound Assessment Dry;Pink/red; Other (Comment) 10/23/21 1514   Drainage Amount None 10/23/21 1514   Wound Odor None 10/23/21 1514   Mandy-Wound/Incision Assessment Dry/flaky 10/23/21 1514   Edges Attached edges 10/23/21 1514   Number of days: 5       Wound Toe (Comment  which one) Anterior;Left;Proximal Fluid-filled Bullae 10/23/21 (Active)   Wound Image   10/23/21 1513 Dressing/Treatment Open to air 10/23/21 1513   Wound Assessment Dry;Fluid filled blister 10/23/21 1513   Drainage Amount None 10/23/21 1513   Wound Odor None 10/23/21 1513   Mandy-Wound/Incision Assessment Edematous 10/23/21 1513   Edges Attached edges 10/23/21 1513   Number of days: 0          PLAN     Skin Care & Pressure Relief Recommendations  Speciality bed Envella Air Fluidized Bed  Minimize layers of linen  Turn/reposition approximately every 2 hours  Pillow wedges  Manage incontinence   Promote continence; Skin Protective lotion/cream to buttocks and sacrum daily and as needed with incontinence care  Offload heels pillows  Other: Maintain pillows between BLEs    [address]  Blood Glucose: 92 on 10/21/21                             Albumin: 1.8 on 10/21/21  WBCs: 9.0 on 10/21/21    Support Surface: Currently on Gel mattress. Ordered a Envella Air Fluidized bed from "[employer]" for increased pressure reduction and climate control. Confirmation [id number]. Bed should deliver this evening. Physician/Provider notified: Dr. Monica Martin  Recommendations: Ensure patient is turned HOURLY to decrease pressure to bony prominences and promote wound healing. Patient is able to be turned and repositioned at 30 degree angle or more using pillows. Patient has foot drop to both feet making it difficult to maintain heel boots on feet properly. Ensure feet are floated with 2-3 pillows at all times daily while in the bed for offloading heels and other bony prominences. When turned on sides, ensure toes are not resting on mattress. Apply zinc paste TID and prn for soiling to sacrum and gluteal folds / cleft for skin protectant r/t incontinence and minor wound healing. Apply Therahoney gel daily to wounds on right greater trochanteric and right ischial, see dressing order. Apply Therahoney gel daily to wound on right 1st toe, see dressing order. Informed Dr. Monica Martin of swelling to feet and swelling to interior right thigh. Will continue to follow.        Discharge Wound Care Needs: TBD    Teaching completed with:   [] Patient           [] Family member       [] Caregiver          [] Nursing  [] Other    Patient/Caregiver Teaching:  Level of patient/caregiver understanding able to:   [] Indicates understanding       [] Needs reinforcement  [] Unsuccessful      [] Verbal Understanding  [] Demonstrated understanding       [] No evidence of learning  [] Refused teaching         [] N/A       Electronically signed by Cheryal Epley, RN on 10/23/2021 at 3:22 PM

## 2021-10-23 NOTE — PROGRESS NOTES
Pulmonary, Critical Care Note    Name: Ede Morris MRN: [id number]   : 1985 Hospital: [facility]   Date: 10/23/2021  Admission date: 10/17/2021 Hospital Day: 7       Subjective/Interval History:   Patient seen on the medical floor. Has best I can understand she is bedbound nonverbal but does eat orally. In any event she has had gurgling respirations were brought to the emergency room has bilateral pneumonia right base greater than left base with a cavitary area in the left lower lung pleural base. She is normally followed at [facility]  10/19 now in the ICU on room air. Apparently had worsening respiratory status last evening and had desaturation was transferred to the ICU has had frequent oral suctioning and is now back on room air  10/20 NG tube in place currently on room air saturation 98% looks at voice otherwise unresponsive    Hospital Problems  Never Reviewed        Codes Class Noted POA    Severe protein-calorie malnutrition ([facility] [address] ICD-10-CM: E43  ICD-9-CM: 862  10/18/2021 Unknown        Acute respiratory failure ([facility] [address] ICD-10-CM: J96.00  ICD-9-CM: 518.81  10/17/2021 Unknown        Failure to thrive in adult ICD-10-CM: R62.7  ICD-9-CM: 783.7  10/17/2021 Unknown        Multifocal pneumonia ICD-10-CM: J18.9  ICD-9-CM: 486  10/17/2021 Unknown              IMPRESSION:   1. Acute hypoxic respiratory failure back on room air  2. Aspiration pneumonia chest x-ray improving  3. Pharyngeal dysphagia  4. Possible gastroparesis with reflux  5. Fecal impaction with ileus  6. Ileus has bowel sounds  7. Severe malnutrition  Body mass index is 12.1 kg/m². RECOMMENDATIONS/PLAN:   1. Yesterday patient was having more shortness of breath hypoxic episode received Solu-Medrol chest x-ray done condition improved she is back on room air saturation 100%  2. Leave on room air NG tube in place for EGD possible today  3. Maintain n.p.o. status.   4. Overall condition and prognosis poor          [x] High complexity decision making was performed  [x] See my orders for details      Subjective/Initial History:     I was asked by Jama Aldridge MD to see Sundar Badillo  a 39 y.o.  female in consultation for a chief complaint of acute hypoxic respiratory failure aspiration pneumonia with cavitation        No Known Allergies     MAR reviewed and pertinent medications noted or modified as needed     Current Facility-Administered Medications   Medication    albuterol-ipratropium (DUO-NEB) 2.5 MG-0.5 MG/3 ML    albuterol-ipratropium (DUO-NEB) 2.5 MG-0.5 MG/3 ML    sorbitoL 70 % solution 30 mL    glycopyrrolate (ROBINUL) tablet 0.5 mg    docusate (COLACE) 50 mg/5 mL oral liquid 100 mg    dextrose 5% infusion    potassium chloride (KLOR-CON) packet for solution 20 mEq    [Held by provider] mirtazapine (REMERON) tablet 15 mg    sodium chloride (NS) flush 5-40 mL    sodium chloride (NS) flush 5-40 mL    acetaminophen (TYLENOL) tablet 650 mg    Or    acetaminophen (TYLENOL) suppository 650 mg    polyethylene glycol (MIRALAX) packet 17 g    ondansetron (ZOFRAN ODT) tablet 4 mg    Or    ondansetron (ZOFRAN) injection 4 mg    enoxaparin (LOVENOX) injection 40 mg    piperacillin-tazobactam (ZOSYN) 3.375 g in 0.9% sodium chloride (MBP/ADV) 100 mL MBP      Patient PCP: Unknown, Provider, MD  PMH:  has a past medical history of Cerebellar ataxia ([address] PSH:   has a past surgical history that includes pr breast surgery procedure unlisted. FHX: family history is not on file. SHX:  reports that she has never smoked. She has never used smokeless tobacco. She reports that she does not drink alcohol and does not use drugs.   Systemic review unobtainable as the patient is nonverbal      Objective:     Vital Signs: Telemetry:    normal sinus rhythm Intake/Output:   Visit Vitals  /70 (BP 1 Location: Left upper arm, BP Patient Position: At rest;Supine)   Pulse (!) 52   Temp (!) 96.1 °F (35.6 °C) Resp 16   Ht 5' 6\" (1.676 m)   Wt 34 kg (74 lb 15.3 oz)   LMP  (LMP Unknown)   SpO2 93%   BMI 12.10 kg/m²       Temp (24hrs), Av.2 °F (36.2 °C), Min:96.1 °F (35.6 °C), Max:98.7 °F (37.1 °C)        O2 Device: None (Room air) O2 Flow Rate (L/min): 1 l/min       Wt Readings from Last 4 Encounters:   10/21/21 34 kg (74 lb 15.3 oz)        No intake or output data in the 24 hours ending 10/23/21 1219    Last shift:      No intake/output data recorded. Last 3 shifts: 10/21 190 - 10/23 0700  In: 1231.3 [I.V.:1231.3]  Out: -        Physical Exam:   General:  female; in bed profound malnutrition no upper airway noise  HEENT: NCAT, poor dentition, lips and mucosa dry  Eyes: anicteric; conjunctiva clear random eye movement present  Neck: no nodes, no JVD, no accessory MM use  Chest: no deformity,   Cardiac: Regular rate and rhythm  Lungs: No upper airway noise today has a few rales toward the bases very shallow abrasions  Abd: Thin emaciated bowel sounds present  Ext: no edema; no joint swelling;  No clubbing  : clear urine  Neuro: Eyes are open does not look at voice has some random eye movement contracted extremities  Psych-nonverbal unable to assess  Skin: warm, dry, no cyanosis;   Pulses: Brachial radial femoral pulses intact  Capillary: Normal capillary refill    Labs:    Recent Labs     10/21/21  0400   WBC 9.0   HGB 12.9        Recent Labs     10/21/21  0400   *   K 3.6   *   CO2 29   GLU 92   BUN 6   CREA <0.15*   CA 7.9*   MG 1.9   PHOS 1.4*   ALB 1.8*   10/20 on 2 L nasal oxygen PO2 115 PCO2 40 pH 7.42  10/20 room air oxygen saturation 98%     Nasal MRSA smear negative  Blood 1 of 4 bottles with gram-positive cocci  Urine culture no growth  COVID-19 antigen negative  Procalcitonin 2.21  Lab Results   Component Value Date/Time    Culture result: No significant growth, <10,000 CFU/mL 10/18/2021 11:15 AM    Culture result: (A) 10/17/2021 05:30 PM     Gram Positive Cocci CALLED TO AND READ BACK BY Marisol Steen R.N. 1030 10/19/2021 BY ARYA    Culture result:  10/17/2021 05:30 PM     Staphylococcus species, coagulase negative growing in 1 of 4 bottles drawn NO SITE     Imaging:    CXR Results  (Last 48 hours)               10/19/21 0333  XR CHEST PORT Final result    Impression:  FINDINGS: IMPRESSION: Single frontal view of the chest. Patient's left hand   obscures the left lung/hemithorax. Enteric tube distal tip below left hemidiaphragm. Normal heart size. Similar right perihilar and upper lobe airspace disease. No sizable pleural   effusion or pneumothorax. To me I agree there is right upper lobe infiltrate there is also left basilar infiltrate with obscuration of the hemidiaphragm       Narrative:  Aspiration pneumonia. Comparison chest x-ray 10/18/2021.           10/18/21 2115  XR CHEST PORT Final result    Impression:  Slightly retracted enteric tube, otherwise unchanged. Narrative:  AP portable chest, 2108 hours. Comparison: Earlier the same day at 2009 hours. Findings: The enteric tube has been retracted slightly. The tip is in the region   of the gastric fundus. The sidehole remains below the diaphragm. Cardiac   monitoring leads overlie the chest.       The heart is normal in size. Multifocal airspace disease and a left-sided   cavitary lesion are again noted. There is no pleural effusion or pneumothorax. There is gaseous distention of the colon. 10/18/21 2017  XR CHEST PORT Final result    Impression:  1. Enteric tube as above. 2. Persistent multifocal airspace disease with a cavitary lesion in the left   lateral lung. Narrative:  AP portable chest, 2009 hours. Comparison: 10/17/2021. Findings: There is an enteric tube in place which coils overlying the gastric   fundus with the tip projected back towards the gastroesophageal junction.    Cardiac monitoring leads overlie the chest. The heart is normal in size. There   is persistent dense consolidation in the right perihilar region. There is a   cavitary lesion laterally in the left midlung zone. No pleural effusion or   pneumothorax is identified. The osseous structures are unremarkable. There is   gaseous distention of the bowel. 10/17/21 1512  XR CHEST PORT Final result    Impression:  1. Large right perihilar opacity, mass versus airspace disease. Further   characterization with CT is recommended. 2. Nodular opacity in the left midlung zone. Further characterization with CT is   recommended. Narrative:  AP portable chest, 1500 hours. Comparison: None. Findings: Multiple cardiac monitoring leads overlie the chest. The heart is   normal in size. There is an 8.4 cm right perihilar opacity. There is a 3.7 cm   opacity in the left midlung zone. There is no pulmonary vascular congestion. No   pleural effusion or pneumothorax is identified. The osseous structures are   unremarkable. Results from [place] encounter on 10/17/21    XR CHEST PORT    Narrative  1 view at 9246    NG tube remains    Essentially clear lungs. No effusion or pneumothorax. Normal heart and stable  mediastinum. Diffuse bowel distention again evident      XR CHEST PORT    Narrative  Chest, frontal view, 10/20/2021    History: NG tube retracted due to coiling. Placement. Comparison: Including chest, same day. Findings: Feeding tube tip is at the stomach. No coiling is evident. The  cardiac silhouette is stable. Lung volumes are stable. Airspace consolidation  in the lungs most pronounced at the right perihilar region and bases are stable  as compared to the study performed earlier the same day. Known cavitary lesion  in the left lung is not well-visualized. No pleural effusion or pneumothorax is  evident. The osseous structures are stable. Impression  Feeding tube as above. No significant interval change.       XR CHEST PORT    Narrative  NG tube placement. Comparison chest x-ray 10/20/2021 at 2:43 AM.    Impression  FINDINGS: IMPRESSION: Single frontal view of the abdomen. Enteric tube passes below the left hemidiaphragm, coiled in the gastric fundus  region. Normal heart size. Increased hypoinflation and bilateral pulmonary airspace pneumonia and/or  atelectasis. Cavitary lesion left lower lobe as previous. No large pleural  effusion or pneumothorax. No free air under the diaphragm. Results from [place] encounter on 10/17/21    CTA CHEST W OR W WO CONT    Narrative  Chest CTA    TECHNIQUE: Multiple continuous axial images were obtained from the thoracic  inlet to the upper abdomen after the uneventful administration of intravenous  contrast. Reformatted images as well as maximum intensity projection images were  obtained in the sagittal and coronal planes. Comment on dose reduction: All CT scans at this facility are performed using  dose reduction optimization technique as appropriate to perform the exam  including the following; automated exposure control, adjustments of the mA  and/or kV according to patient size, or use of iterative reconstructed  technique. Comparison examination: Chest radiograph dated October 17, 2021    Findings:  LYMPHADENOPATHY: Mediastinal lymph nodes top normal in size, likely reactive to  the process in the lungs described below. CARDIOVASCULAR: Negative for pulmonary embolus. Heart normal in size. Thoracic  aorta normal in caliber, negative for dissection. Great vessels patent. LUNGS AND PLEURA: Tree-in-bud centrilobular micronodules diffusely throughout  the lungs with subpleural consolidation present right lower lobe and left  posterior basilar segment. Cavitary lesion with air-fluid level present left  lower lobe measuring approximately 21 mm, cavitary lesion with air-fluid level  medial segment middle lobe measuring 21 mm.     INCLUDED ABDOMEN: The visualized upper abdomen images normally. CHEST WALL: No suspicious lytic or blastic lesion is identified. Impression  Negative for pulmonary embolus. Findings the lungs consistent with multilobar pneumonia with concomitant  bronchiolitis. The cavitary foci may represent pneumatoceles or potentially  septic emboli, echocardiogram recommended when clinically able. Discussion patient has gurgling respirations has pharyngeal dysphagia is chronically aspirating. Initial x-ray was suspicious for gastric distention as well very likely has gastroparesis with reflux and aspiration on that. Chest x-ray and CT show bibasilar infiltrates right greater than left consistent with aspiration and there is a cavitary area in the left lung which suggest this is a chronic process. She is on Zosyn I agree with that. Agree n.p.o. status with feeding tube. She likely needs a PEG tube  10/19 now in the ICU due to respiratory distress has a lot of upper airway noise with inability to control oral secretions. Nurses are requesting Robinul however abdominal x-ray is suggestive of ileus with fecal impaction. Robinul could possibly make this worse. Despite this we will place on small dose Robinul. We will give enema start Colace per tube. Nasal MRSA smear and blood culture are negative we will discontinue vancomycin. Potassium to be repleted IV. Sodium remains highly elevated but is minimally improved. 10/20 respirations nonlabored today upper airway noise absent. Will decrease dose Robinul as I am worried about ileus. Abdominal x-ray still suggest fecal impaction. She received an enema yesterday we will repeat today and add sorbitol per G-tube.   Would continue NG feedings  10/21 chest x-ray shows bilateral infiltrate with cavitary lesion NG tube is in the gastric fundal region  10/22 condition stable this morning saturation 100% on room air  Time of care 30 minutes    Neha Rosales MD

## 2021-10-24 NOTE — PROGRESS NOTES
Pulmonary, Critical Care Note    Name: Zayra Julian MRN: [id number]   : 1985 Hospital: [facility]   Date: 10/24/2021  Admission date: 10/17/2021 Hospital Day: 8       Subjective/Interval History:   Patient seen on the medical floor. Has best I can understand she is bedbound nonverbal but does eat orally. In any event she has had gurgling respirations were brought to the emergency room has bilateral pneumonia right base greater than left base with a cavitary area in the left lower lung pleural base. She is normally followed at [facility]  10/19 now in the ICU on room air. Apparently had worsening respiratory status last evening and had desaturation was transferred to the ICU has had frequent oral suctioning and is now back on room air  10/20 NG tube in place currently on room air saturation 98% looks at voice otherwise unresponsive    Hospital Problems  Never Reviewed        Codes Class Noted POA    Severe protein-calorie malnutrition ([facility] [address] ICD-10-CM: E43  ICD-9-CM: 246  10/18/2021 Unknown        Acute respiratory failure ([facility] [address] ICD-10-CM: J96.00  ICD-9-CM: 518.81  10/17/2021 Unknown        Failure to thrive in adult ICD-10-CM: R62.7  ICD-9-CM: 783.7  10/17/2021 Unknown        Multifocal pneumonia ICD-10-CM: J18.9  ICD-9-CM: 486  10/17/2021 Unknown              IMPRESSION:   1. Acute hypoxic respiratory failure back on room air  2. Aspiration pneumonia chest x-ray improving  3. Pharyngeal dysphagia  4. Possible gastroparesis with reflux  5. Fecal impaction with ileus  6. Ileus has bowel sounds  7. Severe malnutrition  Body mass index is 12.1 kg/m². RECOMMENDATIONS/PLAN:   1. Patient was having more shortness of breath hypoxic episode received Solu-Medrol chest x-ray done condition improved she is back on room air saturation 100%  2. Leave on room air NG tube in place for EGD  3. Maintain n.p.o. status.   4. Overall condition and prognosis poor comfort measures or hospice care evaluation          [x] High complexity decision making was performed  [x] See my orders for details      Subjective/Initial History:     I was asked by Gloria Dillon MD to see Raymond Murillo  a 39 y.o.  female in consultation for a chief complaint of acute hypoxic respiratory failure aspiration pneumonia with cavitation        No Known Allergies     MAR reviewed and pertinent medications noted or modified as needed     Current Facility-Administered Medications   Medication    zinc oxide-white petrolatum 17-57 % topical paste    albuterol-ipratropium (DUO-NEB) 2.5 MG-0.5 MG/3 ML    albuterol-ipratropium (DUO-NEB) 2.5 MG-0.5 MG/3 ML    sorbitoL 70 % solution 30 mL    glycopyrrolate (ROBINUL) tablet 0.5 mg    docusate (COLACE) 50 mg/5 mL oral liquid 100 mg    dextrose 5% infusion    potassium chloride (KLOR-CON) packet for solution 20 mEq    [Held by provider] mirtazapine (REMERON) tablet 15 mg    sodium chloride (NS) flush 5-40 mL    sodium chloride (NS) flush 5-40 mL    acetaminophen (TYLENOL) tablet 650 mg    Or    acetaminophen (TYLENOL) suppository 650 mg    polyethylene glycol (MIRALAX) packet 17 g    ondansetron (ZOFRAN ODT) tablet 4 mg    Or    ondansetron (ZOFRAN) injection 4 mg    enoxaparin (LOVENOX) injection 40 mg    piperacillin-tazobactam (ZOSYN) 3.375 g in 0.9% sodium chloride (MBP/ADV) 100 mL MBP      Patient PCP: Unknown, Provider, MD  PMH:  has a past medical history of Cerebellar ataxia ([facility] [address] PSH:   has a past surgical history that includes pr breast surgery procedure unlisted. FHX: family history is not on file. SHX:  reports that she has never smoked. She has never used smokeless tobacco. She reports that she does not drink alcohol and does not use drugs.   Systemic review unobtainable as the patient is nonverbal      Objective:     Vital Signs: Telemetry:    normal sinus rhythm Intake/Output:   Visit Vitals  /71 (BP 1 Location: Left upper arm, BP Patient Position: At rest)   Pulse (!) 53   Temp 96.8 °F (36 °C)   Resp 16   Ht 5' 6\" (1.676 m)   Wt 34 kg (74 lb 15.3 oz)   LMP  (LMP Unknown)   SpO2 94%   BMI 12.10 kg/m²       Temp (24hrs), Av.9 °F (36.6 °C), Min:96.8 °F (36 °C), Max:98.7 °F (37.1 °C)        O2 Device: None (Room air) O2 Flow Rate (L/min): 1 l/min       Wt Readings from Last 4 Encounters:   10/21/21 34 kg (74 lb 15.3 oz)        No intake or output data in the 24 hours ending 10/24/21 1050    Last shift:      No intake/output data recorded. Last 3 shifts: No intake/output data recorded. Physical Exam:   General:  female; in bed profound malnutrition no upper airway noise  HEENT: NCAT, poor dentition, lips and mucosa dry  Eyes: anicteric; conjunctiva clear random eye movement present  Neck: no nodes, no JVD, no accessory MM use  Chest: no deformity,   Cardiac: Regular rate and rhythm  Lungs: No upper airway noise today has a few rales toward the bases very shallow abrasions  Abd: Thin emaciated bowel sounds present  Ext: no edema; no joint swelling; No clubbing  : clear urine  Neuro: Eyes are open does not look at voice has some random eye movement contracted extremities  Psych-nonverbal unable to assess  Skin: warm, dry, no cyanosis;   Pulses: Brachial radial femoral pulses intact  Capillary: Normal capillary refill    Labs:    No results for input(s): WBC, HGB, PLT, INR, APTT, HGBEXT, PLTEXT, INREXT, HGBEXT, PLTEXT, INREXT in the last 72 hours. No lab exists for component: FIB, DDMER  No results for input(s): NA, K, CL, CO2, GLU, BUN, CREA, CA, MG, PHOS, LAC, ALB, TBIL, ALT, AML, LPSE in the last 72 hours.     No lab exists for component: SGOT10/20 on 2 L nasal oxygen PO2 115 PCO2 40 pH 7.42  10/20 room air oxygen saturation 98%     Nasal MRSA smear negative  Blood 1 of 4 bottles with gram-positive cocci  Urine culture no growth  COVID-19 antigen negative  Procalcitonin 2.21  Lab Results   Component Value Date/Time Culture result: No significant growth, <10,000 CFU/mL 10/18/2021 11:15 AM    Culture result: (A) 10/17/2021 05:30 PM     Gram Positive Cocci CALLED TO AND READ BACK BY HOME Mejia R.N. 1030 10/19/2021 BY DPW    Culture result:  10/17/2021 05:30 PM     Staphylococcus species, coagulase negative growing in 1 of 4 bottles drawn NO SITE     Imaging:    CXR Results  (Last 48 hours)               10/19/21 0333  XR CHEST PORT Final result    Impression:  FINDINGS: IMPRESSION: Single frontal view of the chest. Patient's left hand   obscures the left lung/hemithorax. Enteric tube distal tip below left hemidiaphragm. Normal heart size. Similar right perihilar and upper lobe airspace disease. No sizable pleural   effusion or pneumothorax. To me I agree there is right upper lobe infiltrate there is also left basilar infiltrate with obscuration of the hemidiaphragm       Narrative:  Aspiration pneumonia. Comparison chest x-ray 10/18/2021.           10/18/21 2115  XR CHEST PORT Final result    Impression:  Slightly retracted enteric tube, otherwise unchanged. Narrative:  AP portable chest, 2108 hours. Comparison: Earlier the same day at 2009 hours. Findings: The enteric tube has been retracted slightly. The tip is in the region   of the gastric fundus. The sidehole remains below the diaphragm. Cardiac   monitoring leads overlie the chest.       The heart is normal in size. Multifocal airspace disease and a left-sided   cavitary lesion are again noted. There is no pleural effusion or pneumothorax. There is gaseous distention of the colon. 10/18/21 2017  XR CHEST PORT Final result    Impression:  1. Enteric tube as above. 2. Persistent multifocal airspace disease with a cavitary lesion in the left   lateral lung. Narrative:  AP portable chest, 2009 hours. Comparison: 10/17/2021. Findings:  There is an enteric tube in place which coils overlying the gastric   fundus with the tip projected back towards the gastroesophageal junction. Cardiac monitoring leads overlie the chest. The heart is normal in size. There   is persistent dense consolidation in the right perihilar region. There is a   cavitary lesion laterally in the left midlung zone. No pleural effusion or   pneumothorax is identified. The osseous structures are unremarkable. There is   gaseous distention of the bowel. 10/17/21 1512  XR CHEST PORT Final result    Impression:  1. Large right perihilar opacity, mass versus airspace disease. Further   characterization with CT is recommended. 2. Nodular opacity in the left midlung zone. Further characterization with CT is   recommended. Narrative:  AP portable chest, 1500 hours. Comparison: None. Findings: Multiple cardiac monitoring leads overlie the chest. The heart is   normal in size. There is an 8.4 cm right perihilar opacity. There is a 3.7 cm   opacity in the left midlung zone. There is no pulmonary vascular congestion. No   pleural effusion or pneumothorax is identified. The osseous structures are   unremarkable. Results from [place] encounter on 10/17/21    XR CHEST PORT    Narrative  1 view at 1655    NG tube remains    Essentially clear lungs. No effusion or pneumothorax. Normal heart and stable  mediastinum. Diffuse bowel distention again evident      XR CHEST PORT    Narrative  Chest, frontal view, 10/20/2021    History: NG tube retracted due to coiling. Placement. Comparison: Including chest, same day. Findings: Feeding tube tip is at the stomach. No coiling is evident. The  cardiac silhouette is stable. Lung volumes are stable. Airspace consolidation  in the lungs most pronounced at the right perihilar region and bases are stable  as compared to the study performed earlier the same day. Known cavitary lesion  in the left lung is not well-visualized.   No pleural effusion or pneumothorax is  evident. The osseous structures are stable. Impression  Feeding tube as above. No significant interval change. XR CHEST PORT    Narrative  NG tube placement. Comparison chest x-ray 10/20/2021 at 2:43 AM.    Impression  FINDINGS: IMPRESSION: Single frontal view of the abdomen. Enteric tube passes below the left hemidiaphragm, coiled in the gastric fundus  region. Normal heart size. Increased hypoinflation and bilateral pulmonary airspace pneumonia and/or  atelectasis. Cavitary lesion left lower lobe as previous. No large pleural  effusion or pneumothorax. No free air under the diaphragm. Results from [place] encounter on 10/17/21    CTA CHEST W OR W WO CONT    Narrative  Chest CTA    TECHNIQUE: Multiple continuous axial images were obtained from the thoracic  inlet to the upper abdomen after the uneventful administration of intravenous  contrast. Reformatted images as well as maximum intensity projection images were  obtained in the sagittal and coronal planes. Comment on dose reduction: All CT scans at this facility are performed using  dose reduction optimization technique as appropriate to perform the exam  including the following; automated exposure control, adjustments of the mA  and/or kV according to patient size, or use of iterative reconstructed  technique. Comparison examination: Chest radiograph dated October 17, 2021    Findings:  LYMPHADENOPATHY: Mediastinal lymph nodes top normal in size, likely reactive to  the process in the lungs described below. CARDIOVASCULAR: Negative for pulmonary embolus. Heart normal in size. Thoracic  aorta normal in caliber, negative for dissection. Great vessels patent. LUNGS AND PLEURA: Tree-in-bud centrilobular micronodules diffusely throughout  the lungs with subpleural consolidation present right lower lobe and left  posterior basilar segment.  Cavitary lesion with air-fluid level present left  lower lobe measuring approximately 21 mm, cavitary lesion with air-fluid level  medial segment middle lobe measuring 21 mm. INCLUDED ABDOMEN: The visualized upper abdomen images normally. CHEST WALL: No suspicious lytic or blastic lesion is identified. Impression  Negative for pulmonary embolus. Findings the lungs consistent with multilobar pneumonia with concomitant  bronchiolitis. The cavitary foci may represent pneumatoceles or potentially  septic emboli, echocardiogram recommended when clinically able. Discussion patient has gurgling respirations has pharyngeal dysphagia is chronically aspirating. Initial x-ray was suspicious for gastric distention as well very likely has gastroparesis with reflux and aspiration on that. Chest x-ray and CT show bibasilar infiltrates right greater than left consistent with aspiration and there is a cavitary area in the left lung which suggest this is a chronic process. She is on Zosyn I agree with that. Agree n.p.o. status with feeding tube. She likely needs a PEG tube  10/19 now in the ICU due to respiratory distress has a lot of upper airway noise with inability to control oral secretions. Nurses are requesting Robinul however abdominal x-ray is suggestive of ileus with fecal impaction. Robinul could possibly make this worse. Despite this we will place on small dose Robinul. We will give enema start Colace per tube. Nasal MRSA smear and blood culture are negative we will discontinue vancomycin. Potassium to be repleted IV. Sodium remains highly elevated but is minimally improved. 10/20 respirations nonlabored today upper airway noise absent. Will decrease dose Robinul as I am worried about ileus. Abdominal x-ray still suggest fecal impaction. She received an enema yesterday we will repeat today and add sorbitol per G-tube.   Would continue NG feedings  10/21 chest x-ray shows bilateral infiltrate with cavitary lesion NG tube is in the gastric fundal region  10/22 condition stable this morning saturation 100% on room air  Time of care 30 minutes    Lorie Ansari MD

## 2021-10-24 NOTE — PROGRESS NOTES
Hospitalist Progress Note               Daily Progress Note: 10/24/2021      Subjective:   Patient is seen for follow-up. 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 Patient was noted to be nonverbal, chronically ill looking, contracted and in respiratory distress. Initially oxygen saturations were in the 80s. She was placed on a nonrebreather. CTA of the chest showed multi focal airspace disease and a small cavitary lesion in the left lower lobe. Patient was admitted and started on Zosyn. Family requested DNR CODE STATUS although did agree to intubation if needed    Patient seen in ICU. Resting comfortably in no respiratory distress. On room air.       Problem List:  Problem List as of 10/24/2021 Never Reviewed        Codes Class Noted - Resolved    Severe protein-calorie malnutrition ([facility] [address] ICD-10-CM: E43  ICD-9-CM: 262  10/18/2021 - Present        Acute respiratory failure ([facility] [address] ICD-10-CM: J96.00  ICD-9-CM: 518.81  10/17/2021 - Present        Failure to thrive in adult ICD-10-CM: R62.7  ICD-9-CM: 783.7  10/17/2021 - Present        Multifocal pneumonia ICD-10-CM: J18.9  ICD-9-CM: 881  10/17/2021 - Present              Medications reviewed  Current Facility-Administered Medications   Medication Dose Route Frequency    zinc oxide-white petrolatum 17-57 % topical paste   Topical TID    albuterol-ipratropium (DUO-NEB) 2.5 MG-0.5 MG/3 ML  3 mL Nebulization Q6HWA RT    albuterol-ipratropium (DUO-NEB) 2.5 MG-0.5 MG/3 ML  3 mL Nebulization Q6H PRN    sorbitoL 70 % solution 30 mL  30 mL Per G Tube DAILY    glycopyrrolate (ROBINUL) tablet 0.5 mg  0.5 mg Per G Tube BID    docusate (COLACE) 50 mg/5 mL oral liquid 100 mg  100 mg Per G Tube BID    dextrose 5% infusion  75 mL/hr IntraVENous CONTINUOUS    potassium chloride (KLOR-CON) packet for solution 20 mEq  20 mEq Per NG tube TID WITH MEALS    [Held by provider] mirtazapine (REMERON) tablet 15 mg  15 mg Oral QHS    sodium chloride (NS) flush 5-40 mL  5-40 mL IntraVENous Q8H    sodium chloride (NS) flush 5-40 mL  5-40 mL IntraVENous PRN    acetaminophen (TYLENOL) tablet 650 mg  650 mg Oral Q6H PRN    Or    acetaminophen (TYLENOL) suppository 650 mg  650 mg Rectal Q6H PRN    polyethylene glycol (MIRALAX) packet 17 g  17 g Oral DAILY PRN    ondansetron (ZOFRAN ODT) tablet 4 mg  4 mg Oral Q8H PRN    Or    ondansetron (ZOFRAN) injection 4 mg  4 mg IntraVENous Q6H PRN    enoxaparin (LOVENOX) injection 40 mg  40 mg SubCUTAneous DAILY    piperacillin-tazobactam (ZOSYN) 3.375 g in 0.9% sodium chloride (MBP/ADV) 100 mL MBP  3.375 g IntraVENous Q8H       Review of Systems:   Review of systems not obtained due to patient factors. Objective:   Physical Exam:     Visit Vitals  /73 (BP 1 Location: Left upper arm, BP Patient Position: At rest)   Pulse 63   Temp 97.5 °F (36.4 °C)   Resp 16   Ht 5' 6\" (1.676 m)   Wt 34 kg (74 lb 15.3 oz)   LMP  (LMP Unknown)   SpO2 92%   BMI 12.10 kg/m²    O2 Flow Rate (L/min): 1 l/min O2 Device: None (Room air)    Temp (24hrs), Av.8 °F (36.6 °C), Min:96.8 °F (36 °C), Max:98.7 °F (37.1 °C)    No intake/output data recorded. No intake/output data recorded. General:   Unresponsive, cachectic and emaciated   Lungs:    Rhonchi bilateral   Chest wall:  No tenderness or deformity. Heart:  Regular rate and rhythm, S1, S2 normal, no murmur, click, rub or gallop. Abdomen:   Soft, non-tender. Bowel sounds normal. No masses,  No organomegaly. Extremities:  Contracted   Pulses: 2+ and symmetric all extremities. Skin: Skin color, texture, turgor normal. No rashes or lesions   Neurologic: CNII-XII intact.   Contractures of upper and lower extremities         Data Review:       Recent Days:  No results for input(s): WBC, HGB, HCT, PLT, HGBEXT, HCTEXT, PLTEXT, HGBEXT, HCTEXT, PLTEXT in the last 72 hours. No results for input(s): NA, K, CL, CO2, GLU, BUN, CREA, CA, MG, PHOS, ALB, TBIL, TBILI, ALT, INR, INREXT, INREXT in the last 72 hours. No lab exists for component: SGOT  No results for input(s): PH, PCO2, PO2, HCO3, FIO2 in the last 72 hours. 24 Hour Results:  No results found for this or any previous visit (from the past 24 hour(s)). XR CHEST PORT   Final Result      XR CHEST PORT   Final Result      XR CHEST PORT   Final Result   Feeding tube as above. No significant interval change. XR CHEST PORT   Final Result   FINDINGS: IMPRESSION: Single frontal view of the abdomen. Enteric tube passes below the left hemidiaphragm, coiled in the gastric fundus   region. Normal heart size. Increased hypoinflation and bilateral pulmonary airspace pneumonia and/or   atelectasis. Cavitary lesion left lower lobe as previous. No large pleural   effusion or pneumothorax. No free air under the diaphragm. XR CHEST PORT   Final Result      XR ABD (KUB)   Final Result      XR ABD PORT  1 V   Final Result      XR CHEST PORT   Final Result   FINDINGS: IMPRESSION: Single frontal view of the chest. Patient's left hand   obscures the left lung/hemithorax. Enteric tube distal tip below left hemidiaphragm. Normal heart size. Similar right perihilar and upper lobe airspace disease. No sizable pleural   effusion or pneumothorax. XR CHEST PORT   Final Result   Slightly retracted enteric tube, otherwise unchanged. XR CHEST PORT   Final Result   1. Enteric tube as above. 2. Persistent multifocal airspace disease with a cavitary lesion in the left   lateral lung. CTA CHEST W OR W WO CONT   Final Result   Negative for pulmonary embolus. Findings the lungs consistent with multilobar pneumonia with concomitant   bronchiolitis. The cavitary foci may represent pneumatoceles or potentially   septic emboli, echocardiogram recommended when clinically able.        XR CHEST PORT   Final Result   1. Large right perihilar opacity, mass versus airspace disease. Further   characterization with CT is recommended. 2. Nodular opacity in the left midlung zone. Further characterization with CT is   recommended. XR NECK SOFT TISSUE   Final Result   No significant soft tissue swelling or radiodense foreign body. Assessment:  Multifocal bilateral pneumonia with cavitary lesion left lower lobe    Acute respiratory failure with hypoxia    Hypernatremia due to dehydration    Sepsis with tachycardia and elevated lactate. Present on admission    Hypokalemia    Possible complicated UTI    Hereditary spinocerebellar ataxia with chronic debilitation    Adult failure to thrive    Severe protein calorie malnutrition    Severe constipation      Plan:  Continue supportive care including IV zosyn/vancomycin  Monitor routine electrolytes  Replete potassium  PEG tube placed , tolerating PEF feed. She is on room air,       Clinical updates provided to niece, Ms Omar Ludwig. I explained to her the difficulties of attempting to place PEG. We also discussed hospice versus comfort care but she is not inclined to hospice at this time. She would like to give cousin a chance. We plan to dc her tomorrow, likely HH and PEG feeds    Care Plan discussed with: Nurse         Disposition: Continued inpatient care    Total time spent with patient: 30 minutes.     Edinson Slaughter MD

## 2021-10-25 NOTE — PROGRESS NOTES
Patient's temperature rectally 93. 2. contacted Dr. Luciana Pagan in regards to patient's condition and the unavailability of warming blanket machines on all unit. ICU transfer orders received and report called and given to [facility], [address] [place] [address]. Patient transferred to [facility] on cardiac monitor. Patient KIM Mathews contacted and updated on patient condition/transfer.

## 2021-10-25 NOTE — PROGRESS NOTES
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 Patient was noted to be nonverbal, chronically ill looking, contracted and in respiratory distress. Initially oxygen saturations were in the 80s. She was placed on a nonrebreather. CTA of the chest showed multi focal airspace disease and a small cavitary lesion in the left lower lobe. Patient was admitted and started on Zosyn.   Family requested DNR CODE STATUS although did agree to intubation if needed    On 10/24: developed fever with requiring 4 L NC,                     Possible aspiration                    Transfer to ICU    Problem List:  Problem List as of 10/25/2021 Never Reviewed        Codes Class Noted - Resolved    Severe protein-calorie malnutrition ([facility] [address] ICD-10-CM: E43  ICD-9-CM: 262  10/18/2021 - Present        Acute respiratory failure ([facility] [address] ICD-10-CM: J96.00  ICD-9-CM: 518.81  10/17/2021 - Present        Failure to thrive in adult ICD-10-CM: R62.7  ICD-9-CM: 783.7  10/17/2021 - Present        Multifocal pneumonia ICD-10-CM: J18.9  ICD-9-CM: 486  10/17/2021 - Present              Medications reviewed  Current Facility-Administered Medications   Medication Dose Route Frequency    zinc oxide-white petrolatum 17-57 % topical paste   Topical TID    albuterol-ipratropium (DUO-NEB) 2.5 MG-0.5 MG/3 ML  3 mL Nebulization Q6HWA RT    albuterol-ipratropium (DUO-NEB) 2.5 MG-0.5 MG/3 ML  3 mL Nebulization Q6H PRN    sorbitoL 70 % solution 30 mL  30 mL Per G Tube DAILY    glycopyrrolate (ROBINUL) tablet 0.5 mg  0.5 mg Per G Tube BID    docusate (COLACE) 50 mg/5 mL oral liquid 100 mg  100 mg Per G Tube BID    potassium chloride (KLOR-CON) packet for solution 20 mEq  20 mEq Per NG tube TID WITH MEALS    [Held by provider] mirtazapine (REMERON) tablet 15 mg  15 mg Oral QHS    sodium chloride (NS) flush 5-40 mL  5-40 mL IntraVENous Q8H    sodium chloride (NS) flush 5-40 mL  5-40 mL IntraVENous PRN    acetaminophen (TYLENOL) tablet 650 mg  650 mg Oral Q6H PRN    Or    acetaminophen (TYLENOL) suppository 650 mg  650 mg Rectal Q6H PRN    polyethylene glycol (MIRALAX) packet 17 g  17 g Oral DAILY PRN    ondansetron (ZOFRAN ODT) tablet 4 mg  4 mg Oral Q8H PRN    Or    ondansetron (ZOFRAN) injection 4 mg  4 mg IntraVENous Q6H PRN    enoxaparin (LOVENOX) injection 40 mg  40 mg SubCUTAneous DAILY    piperacillin-tazobactam (ZOSYN) 3.375 g in 0.9% sodium chloride (MBP/ADV) 100 mL MBP  3.375 g IntraVENous Q8H       Review of Systems:   Review of systems not obtained due to patient factors. Objective:   Physical Exam:     Visit Vitals  BP (!) 89/69 (BP 1 Location: Left upper arm, BP Patient Position: At rest)   Pulse 95   Temp 98.6 °F (37 °C)   Resp 14   Ht 5' 6\" (1.676 m)   Wt 34 kg (74 lb 15.3 oz)   LMP  (LMP Unknown)   SpO2 95%   BMI 12.10 kg/m²    O2 Flow Rate (L/min): 1 l/min O2 Device: None (Room air)    Temp (24hrs), Av °F (35.6 °C), Min:93.2 °F (34 °C), Max:99.1 °F (37.3 °C)    No intake/output data recorded. 10/23 190 - 10/25 0700  In: 145   Out: -     General:   Unresponsive, cachectic and emaciated   Lungs:    Rhonchi bilateral   Chest wall:  No tenderness or deformity. Heart:  Regular rate and rhythm, S1, S2 normal, no murmur, click, rub or gallop. Abdomen:   Soft, non-tender. Bowel sounds normal. No masses,  No organomegaly. Extremities:  Contracted   Pulses: 2+ and symmetric all extremities. Skin: Skin color, texture, turgor normal. No rashes or lesions   Neurologic: CNII-XII intact.   Contractures of upper and lower extremities         Data Review:       Recent Days:  Recent Labs     10/25/21  1515   WBC 6.4   HGB 13.0   HCT 39.8        Recent Labs     10/25/21  1330      K 4.8      CO2 31      BUN 3*   CREA 0.25*   CA 8.8   MG 2.0   PHOS 2.0*     No results for input(s): PH, PCO2, PO2, HCO3, FIO2 in the last 72 hours. 24 Hour Results:  Recent Results (from the past 24 hour(s))   GLUCOSE, POC    Collection Time: 10/24/21  7:40 PM   Result Value Ref Range    Glucose (POC) 56 (L) 65 - 117 mg/dL    Performed by Priya Rodas    GLUCOSE, POC    Collection Time: 10/24/21  9:32 PM   Result Value Ref Range    Glucose (POC) 141 (H) 65 - 117 mg/dL    Performed by [address]    Collection Time: 10/25/21  1:30 PM   Result Value Ref Range    Magnesium 2.0 1.6 - 2.4 mg/dL   METABOLIC PANEL, BASIC    Collection Time: 10/25/21  1:30 PM   Result Value Ref Range    Sodium 142 136 - 145 mmol/L    Potassium 4.8 3.5 - 5.1 mmol/L    Chloride 108 97 - 108 mmol/L    CO2 31 21 - 32 mmol/L    Anion gap 3 (L) 5 - 15 mmol/L    Glucose 100 65 - 100 mg/dL    BUN 3 (L) 6 - 20 mg/dL    Creatinine 0.25 (L) 0.55 - 1.02 mg/dL    BUN/Creatinine ratio 12 12 - 20      GFR est AA >60 >60 ml/min/1.73m2    GFR est non-AA >60 >60 ml/min/1.73m2    Calcium 8.8 8.5 - 10.1 mg/dL   PHOSPHORUS    Collection Time: 10/25/21  1:30 PM   Result Value Ref Range    Phosphorus 2.0 (L) 2.6 - 4.7 mg/dL   CBC WITH AUTOMATED DIFF    Collection Time: 10/25/21  3:15 PM   Result Value Ref Range    WBC 6.4 3.6 - 11.0 K/uL    RBC 4.62 3.80 - 5.20 M/uL    HGB 13.0 11.5 - 16.0 g/dL    HCT 39.8 35.0 - 47.0 %    MCV 86.1 80.0 - 99.0 FL    MCH 28.1 26.0 - 34.0 PG    MCHC 32.7 30.0 - 36.5 g/dL    RDW 13.4 11.5 - 14.5 %    PLATELET 538 275 - 635 K/uL    MPV 10.1 8.9 - 12.9 FL    NRBC 0.0 0.0  WBC    ABSOLUTE NRBC 0.00 0.00 - 0.01 K/uL    NEUTROPHILS 62 32 - 75 %    LYMPHOCYTES 32 12 - 49 %    MONOCYTES 5 5 - 13 %    EOSINOPHILS 0 0 - 7 %    BASOPHILS 0 0 - 1 %    IMMATURE GRANULOCYTES 1 (H) 0 - 0.5 %    ABS. NEUTROPHILS 4.0 1.8 - 8.0 K/UL    ABS. LYMPHOCYTES 2.0 0.8 - 3.5 K/UL    ABS. MONOCYTES 0.3 0.0 - 1.0 K/UL    ABS. EOSINOPHILS 0.0 0.0 - 0.4 K/UL    ABS. BASOPHILS 0.0 0.0 - 0.1 K/UL    ABS. IMM. GRANS. 0.1 (H) 0.00 - 0.04 K/UL    DF AUTOMATED         XR CHEST PORT   Final Result   Pulmonary hypoinflation. Unchanged cavitary lesion left lower lobe. XR CHEST PORT   Final Result      XR CHEST PORT   Final Result      XR CHEST PORT   Final Result   Feeding tube as above. No significant interval change. XR CHEST PORT   Final Result   FINDINGS: IMPRESSION: Single frontal view of the abdomen. Enteric tube passes below the left hemidiaphragm, coiled in the gastric fundus   region. Normal heart size. Increased hypoinflation and bilateral pulmonary airspace pneumonia and/or   atelectasis. Cavitary lesion left lower lobe as previous. No large pleural   effusion or pneumothorax. No free air under the diaphragm. XR CHEST PORT   Final Result      XR ABD (KUB)   Final Result      XR ABD PORT  1 V   Final Result      XR CHEST PORT   Final Result   FINDINGS: IMPRESSION: Single frontal view of the chest. Patient's left hand   obscures the left lung/hemithorax. Enteric tube distal tip below left hemidiaphragm. Normal heart size. Similar right perihilar and upper lobe airspace disease. No sizable pleural   effusion or pneumothorax. XR CHEST PORT   Final Result   Slightly retracted enteric tube, otherwise unchanged. XR CHEST PORT   Final Result   1. Enteric tube as above. 2. Persistent multifocal airspace disease with a cavitary lesion in the left   lateral lung. CTA CHEST W OR W WO CONT   Final Result   Negative for pulmonary embolus. Findings the lungs consistent with multilobar pneumonia with concomitant   bronchiolitis. The cavitary foci may represent pneumatoceles or potentially   septic emboli, echocardiogram recommended when clinically able. XR CHEST PORT   Final Result   1. Large right perihilar opacity, mass versus airspace disease.  Further characterization with CT is recommended. 2. Nodular opacity in the left midlung zone. Further characterization with CT is   recommended. XR NECK SOFT TISSUE   Final Result   No significant soft tissue swelling or radiodense foreign body. Assessment:  (1) Multifocal bilateral pneumonia with cavitary lesion left lower lobe: zosyn, repeat XR chest, stop IVF    (2) Acute respiratory failure with hypoxia: likely s/t #1. Repeat XR, stop IVF    (3) Hypernatremia due to dehydration: resolved    (4) Sepsis with tachycardia and elevated lactate. Present on admission: same as #1    Hypokalemia    (5) Possible complicated UTI: zosyn day 8    (6) Hereditary spinocerebellar ataxia with chronic debilitation: complicates all aspects of care    (7) Adult failure to thrive: with aspiration, S/p PEG tube on 10/22. Tube feebs. , labs ordered for refeeding syndrome    (8) Severe protein calorie malnutrition    (9) Severe constipation    [facility], she will eventually go home with [facility] once stable, they have [facility] discussed with: Nurse         Disposition: Continued inpatient care    Total time spent with patient: 30 minutes.     Cindy Kim MD

## 2021-10-25 NOTE — CONSULTS
Comprehensive Nutrition Assessment    Type and Reason for Visit: Reassess (Goal)    Nutrition Recommendations/Plan:   As PEG placed and available for use  Reinitiate Jevity 1.5 via PEG at goal of 35ml/hr, continuous  Flush with 145ml q4h or per MD     Order provides 1260kcal (95%), 54g pro (108%), and 1500ml (100%)      Replete lytes as prn   Obtain DAILY K, Mg, and Phos     Document TF formula, TF rate, flushes, and BM in I/O's    As pt is d/c rec'd   Jevity 1.5 via PEG at 220ml, 4x daily, bolus  Flush with 205ml after each feed or per MD      Provides 1320kcal (100%), 56g pro (112%), and 1500ml (100%)     Nutrition Assessment:  Admitted for sudden onset of gurgling and respiratory distress witnessed by family. Patient is nonverbal, chronically ill looking; MD rec'd palliative but family would like to continue care. Worsening resp status so transferred to ICU. NG placed for nutrition (10/18); PEG placed on 10/23. TF initiated (10/18) and slowly advanced to goal. Major concern for refeeding syndrome, but no lytes labs or IV thiamin initiated per RD recs. RD re consulted today for bolus TF recs. Labs: Glu POC . No new labs obtained since 10/21. Meds: D5 at 75ml/hr, colace, enoxaparin, zosyn, KCl, IVF, sorbitol. Malnutrition Assessment:  Malnutrition Status:  Severe malnutrition    Context:  Chronic illness     Findings of the 6 clinical characteristics of malnutrition:   Energy Intake:  No significant decrease in energy intake  Weight Loss:  Unable to assess     Body Fat Loss:  7 - Severe body fat loss, Fat overlying ribs, Orbital, Triceps   Muscle Mass Loss:  7 - Severe muscle mass loss, Calf (gastrocnemius), Clavicles (pectoralis &deltoids), Hand (interosseous), Thigh (quadriceps), Temples (temporalis)  Fluid Accumulation:  No significant fluid accumulation,      Estimated Daily Nutrient Needs:  Energy (kcal): 1320kcal (40kcal/kg);  Weight Used for Energy Requirements: Current  Protein (g): 50g (1.5g/kg); Weight Used for Protein Requirements: Current  Fluid (ml/day): 1500ml; Method Used for Fluid Requirements: 1 ml/kcal    Nutrition Related Findings:  NFPE showing severe wasting throughout body. Extremely cachetic. No known N/V. Noted constipation- no BM until colace and sorbitol added. Last BM 10/24, loose. No edema. Wounds:    None       Current Nutrition Therapies:  DIET NPO  ADULT TUBE FEEDING Nasogastric; Standard with Fiber; Delivery Method: Continuous; Continuous Initial Rate (mL/hr): 10; Continuous Advance Tube Feeding: Yes; Advancement Volume (mL/hr): 10; Advancement Frequency: Q 12 hours; Continuous Goal Rate (m. .. Anthropometric Measures:  · Height:  5' 6\" (167.6 cm)  · Current Body Wt:  32.9 kg (72 lb 8.5 oz)   · Usual Body Wt:  31.3 kg (69 lb)     · Ideal Body Wt:  130 lbs:  55.8 %   · BMI Category:  Underweight (BMI less than 22) age over 72       Nutrition Diagnosis:   · Inadequate oral intake related to cognitive or neurological impairment as evidenced by BMI, severe muscle loss, severe loss of subcutaneous fat    Nutrition Interventions:   Food and/or Nutrient Delivery: Continue tube feeding  Nutrition Education and Counseling: Education not indicated  Coordination of Nutrition Care: Continue to monitor while inpatient    Goals:  Pt to meet >75% of EEN via NGT within 3 days. Wt gain +0.5kg/week. Lytes WNL.        Nutrition Monitoring and Evaluation:   Behavioral-Environmental Outcomes: None identified  Food/Nutrient Intake Outcomes: Enteral nutrition intake/tolerance  Physical Signs/Symptoms Outcomes: Biochemical data, Weight, Nutrition focused physical findings    Discharge Planning:    Enteral nutrition     Electronically signed by Andrew Ji RD on 10/25/2021 at 10:13 AM    Contact: [phone]

## 2021-10-25 NOTE — PROGRESS NOTES
AROUND 1400, PT'S O2 SAT STARTING DROPPING TO LOWER 90s. PT WAS SUCTIONED ORALLY, AND PLACED ON O2 AT 2 L/M. AFTER A FEW MINUTES; THE O2 SAT DID NOT GO UP. PT CONTINUED TO COUGH, NONPRODUCTIVELY; HAD TO BE DEEP SUCTIONED. O2 SATS CONTINUED TO DROP. AT THIS TIME; THE DOCTOR (YURIY) WAS NOTIFIED AND CAME IN TO SEE PT.  TRANSFER OUT ORDER DISCONTINUED. PT WAS PLACED ON NONREBREATHER AT 15 L/M.  PT'S FAMILY NOTIFIED OF CANCELLATION OF TRANSFER.

## 2021-10-25 NOTE — PROGRESS NOTES
CM reviewed clinical chart. Patient is still being actively treated by internal medicine, gastroenterology, nutrition, pulmonology, and wound care nurse. CM team will continue to follow for any needs at discharge.

## 2021-10-25 NOTE — PROGRESS NOTES
Pulmonary, Critical Care Note    Name: Jonas Mcmillan MRN: [id number]   : 1985 Hospital: [facility]   Date: 10/25/2021  Admission date: 10/17/2021 Hospital Day: 9       Subjective/Interval History:   Patient seen on the medical floor. Has best I can understand she is bedbound nonverbal but does eat orally. In any event she has had gurgling respirations were brought to the emergency room has bilateral pneumonia right base greater than left base with a cavitary area in the left lower lung pleural base. She is normally followed at [facility]  10/19 now in the ICU on room air. Apparently had worsening respiratory status last evening and had desaturation was transferred to the ICU has had frequent oral suctioning and is now back on room air  10/20 NG tube in place currently on room air saturation 98% looks at voice otherwise unresponsive    Hospital Problems  Never Reviewed        Codes Class Noted POA    Severe protein-calorie malnutrition ([facility] [address] ICD-10-CM: E43  ICD-9-CM: 078  10/18/2021 Unknown        Acute respiratory failure ([facility] [address] ICD-10-CM: J96.00  ICD-9-CM: 518.81  10/17/2021 Unknown        Failure to thrive in adult ICD-10-CM: R62.7  ICD-9-CM: 783.7  10/17/2021 Unknown        Multifocal pneumonia ICD-10-CM: J18.9  ICD-9-CM: 486  10/17/2021 Unknown              IMPRESSION:   1. Acute hypoxic respiratory failure back on room air  2. Aspiration pneumonia chest x-ray improving  3. Hypothermia  4. Pharyngeal dysphagia  5. Possible gastroparesis with reflux  6. Fecal impaction with ileus  7. Ileus has bowel sounds  8. Severe malnutrition  Body mass index is 12.1 kg/m². RECOMMENDATIONS/PLAN:   1. Patient was having more shortness of breath hypoxic episode received Solu-Medrol chest x-ray done condition improved she is back on room air saturation 100%  2. Leave on room air NG tube in place for EGD  3. Maintain n.p.o. status.   4. Warming blanket          [x] High complexity decision making was performed  [x] See my orders for details      Subjective/Initial History:     I was asked by Gloria Dillon MD to see Raymond Murillo  a 39 y.o.  female in consultation for a chief complaint of acute hypoxic respiratory failure aspiration pneumonia with cavitation        No Known Allergies     MAR reviewed and pertinent medications noted or modified as needed     Current Facility-Administered Medications   Medication    zinc oxide-white petrolatum 17-57 % topical paste    albuterol-ipratropium (DUO-NEB) 2.5 MG-0.5 MG/3 ML    albuterol-ipratropium (DUO-NEB) 2.5 MG-0.5 MG/3 ML    sorbitoL 70 % solution 30 mL    glycopyrrolate (ROBINUL) tablet 0.5 mg    docusate (COLACE) 50 mg/5 mL oral liquid 100 mg    dextrose 5% infusion    potassium chloride (KLOR-CON) packet for solution 20 mEq    [Held by provider] mirtazapine (REMERON) tablet 15 mg    sodium chloride (NS) flush 5-40 mL    sodium chloride (NS) flush 5-40 mL    acetaminophen (TYLENOL) tablet 650 mg    Or    acetaminophen (TYLENOL) suppository 650 mg    polyethylene glycol (MIRALAX) packet 17 g    ondansetron (ZOFRAN ODT) tablet 4 mg    Or    ondansetron (ZOFRAN) injection 4 mg    enoxaparin (LOVENOX) injection 40 mg    piperacillin-tazobactam (ZOSYN) 3.375 g in 0.9% sodium chloride (MBP/ADV) 100 mL MBP      Patient PCP: Unknown, Provider, MD  PMH:  has a past medical history of Cerebellar ataxia ([address] PSH:   has a past surgical history that includes pr breast surgery procedure unlisted. FHX: family history is not on file. SHX:  reports that she has never smoked. She has never used smokeless tobacco. She reports that she does not drink alcohol and does not use drugs.   Systemic review unobtainable as the patient is nonverbal      Objective:     Vital Signs: Telemetry:    normal sinus rhythm Intake/Output:   Visit Vitals  BP 94/63 (BP 1 Location: Left upper arm, BP Patient Position: At rest)   Pulse 97   Temp (!) 96.6 °F (35.9 °C) Comment: bear hugger on   Resp 19   Ht 5' 6\" (1.676 m)   Wt 34 kg (74 lb 15.3 oz)   LMP  (LMP Unknown)   SpO2 95%   BMI 12.10 kg/m²       Temp (24hrs), Av.3 °F (35.2 °C), Min:93.2 °F (34 °C), Max:97.5 °F (36.4 °C)        O2 Device: None (Room air) O2 Flow Rate (L/min): 1 l/min       Wt Readings from Last 4 Encounters:   10/21/21 34 kg (74 lb 15.3 oz)          Intake/Output Summary (Last 24 hours) at 10/25/2021 1118  Last data filed at 10/25/2021 0336  Gross per 24 hour   Intake 145 ml   Output    Net 145 ml       Last shift:      No intake/output data recorded. Last 3 shifts: 10/23 1901 - 10/25 0700  In: 145   Out: -        Physical Exam:   General:  female; in bed profound malnutrition no upper airway noise  HEENT: NCAT, poor dentition, lips and mucosa dry  Eyes: anicteric; conjunctiva clear random eye movement present  Neck: no nodes, no JVD, no accessory MM use  Chest: no deformity,   Cardiac: Regular rate and rhythm  Lungs: No upper airway noise today has a few rales toward the bases very shallow abrasions  Abd: Thin emaciated bowel sounds present  Ext: no edema; no joint swelling; No clubbing  : clear urine  Neuro: Eyes are open does not look at voice has some random eye movement contracted extremities  Psych-nonverbal unable to assess  Skin: warm, dry, no cyanosis;   Pulses: Brachial radial femoral pulses intact  Capillary: Normal capillary refill    Labs:    No results for input(s): WBC, HGB, PLT, INR, APTT, HGBEXT, PLTEXT, INREXT, HGBEXT, PLTEXT, INREXT in the last 72 hours. No lab exists for component: FIB, DDMER  No results for input(s): NA, K, CL, CO2, GLU, BUN, CREA, CA, MG, PHOS, LAC, ALB, TBIL, ALT, AML, LPSE in the last 72 hours.     No lab exists for component: SGOT10/20 on 2 L nasal oxygen PO2 115 PCO2 40 pH 7.42  10/20 room air oxygen saturation 98%     Nasal MRSA smear negative  Blood 1 of 4 bottles with gram-positive cocci  Urine culture no growth  COVID-19 antigen negative  Procalcitonin 2.21  Lab Results   Component Value Date/Time    Culture result: No significant growth, <10,000 CFU/mL 10/18/2021 11:15 AM    Culture result: (A) 10/17/2021 05:30 PM     Gram Positive Cocci CALLED TO AND READ BACK BY HOME Fernandez R.N. 1030 10/19/2021 BY DPW    Culture result:  10/17/2021 05:30 PM     Staphylococcus species, coagulase negative growing in 1 of 4 bottles drawn NO SITE     Imaging:    CXR Results  (Last 48 hours)               10/19/21 0333  XR CHEST PORT Final result    Impression:  FINDINGS: IMPRESSION: Single frontal view of the chest. Patient's left hand   obscures the left lung/hemithorax. Enteric tube distal tip below left hemidiaphragm. Normal heart size. Similar right perihilar and upper lobe airspace disease. No sizable pleural   effusion or pneumothorax. To me I agree there is right upper lobe infiltrate there is also left basilar infiltrate with obscuration of the hemidiaphragm       Narrative:  Aspiration pneumonia. Comparison chest x-ray 10/18/2021.           10/18/21 2115  XR CHEST PORT Final result    Impression:  Slightly retracted enteric tube, otherwise unchanged. Narrative:  AP portable chest, 2108 hours. Comparison: Earlier the same day at 2009 hours. Findings: The enteric tube has been retracted slightly. The tip is in the region   of the gastric fundus. The sidehole remains below the diaphragm. Cardiac   monitoring leads overlie the chest.       The heart is normal in size. Multifocal airspace disease and a left-sided   cavitary lesion are again noted. There is no pleural effusion or pneumothorax. There is gaseous distention of the colon. 10/18/21 2017  XR CHEST PORT Final result    Impression:  1. Enteric tube as above. 2. Persistent multifocal airspace disease with a cavitary lesion in the left   lateral lung. Narrative:  AP portable chest, 2009 hours. Comparison: 10/17/2021. Findings: There is an enteric tube in place which coils overlying the gastric   fundus with the tip projected back towards the gastroesophageal junction. Cardiac monitoring leads overlie the chest. The heart is normal in size. There   is persistent dense consolidation in the right perihilar region. There is a   cavitary lesion laterally in the left midlung zone. No pleural effusion or   pneumothorax is identified. The osseous structures are unremarkable. There is   gaseous distention of the bowel. 10/17/21 1512  XR CHEST PORT Final result    Impression:  1. Large right perihilar opacity, mass versus airspace disease. Further   characterization with CT is recommended. 2. Nodular opacity in the left midlung zone. Further characterization with CT is   recommended. Narrative:  AP portable chest, 1500 hours. Comparison: None. Findings: Multiple cardiac monitoring leads overlie the chest. The heart is   normal in size. There is an 8.4 cm right perihilar opacity. There is a 3.7 cm   opacity in the left midlung zone. There is no pulmonary vascular congestion. No   pleural effusion or pneumothorax is identified. The osseous structures are   unremarkable. Results from [place] encounter on 10/17/21    XR CHEST PORT    Narrative  1 view at 0433    NG tube remains    Essentially clear lungs. No effusion or pneumothorax. Normal heart and stable  mediastinum. Diffuse bowel distention again evident      XR CHEST PORT    Narrative  Chest, frontal view, 10/20/2021    History: NG tube retracted due to coiling. Placement. Comparison: Including chest, same day. Findings: Feeding tube tip is at the stomach. No coiling is evident. The  cardiac silhouette is stable. Lung volumes are stable. Airspace consolidation  in the lungs most pronounced at the right perihilar region and bases are stable  as compared to the study performed earlier the same day.   Known cavitary lesion  in the left lung is not well-visualized. No pleural effusion or pneumothorax is  evident. The osseous structures are stable. Impression  Feeding tube as above. No significant interval change. XR CHEST PORT    Narrative  NG tube placement. Comparison chest x-ray 10/20/2021 at 2:43 AM.    Impression  FINDINGS: IMPRESSION: Single frontal view of the abdomen. Enteric tube passes below the left hemidiaphragm, coiled in the gastric fundus  region. Normal heart size. Increased hypoinflation and bilateral pulmonary airspace pneumonia and/or  atelectasis. Cavitary lesion left lower lobe as previous. No large pleural  effusion or pneumothorax. No free air under the diaphragm. Results from [place] encounter on 10/17/21    CTA CHEST W OR W WO CONT    Narrative  Chest CTA    TECHNIQUE: Multiple continuous axial images were obtained from the thoracic  inlet to the upper abdomen after the uneventful administration of intravenous  contrast. Reformatted images as well as maximum intensity projection images were  obtained in the sagittal and coronal planes. Comment on dose reduction: All CT scans at this facility are performed using  dose reduction optimization technique as appropriate to perform the exam  including the following; automated exposure control, adjustments of the mA  and/or kV according to patient size, or use of iterative reconstructed  technique. Comparison examination: Chest radiograph dated October 17, 2021    Findings:  LYMPHADENOPATHY: Mediastinal lymph nodes top normal in size, likely reactive to  the process in the lungs described below. CARDIOVASCULAR: Negative for pulmonary embolus. Heart normal in size. Thoracic  aorta normal in caliber, negative for dissection. Great vessels patent.     LUNGS AND PLEURA: Tree-in-bud centrilobular micronodules diffusely throughout  the lungs with subpleural consolidation present right lower lobe and left  posterior basilar segment. Cavitary lesion with air-fluid level present left  lower lobe measuring approximately 21 mm, cavitary lesion with air-fluid level  medial segment middle lobe measuring 21 mm. INCLUDED ABDOMEN: The visualized upper abdomen images normally. CHEST WALL: No suspicious lytic or blastic lesion is identified. Impression  Negative for pulmonary embolus. Findings the lungs consistent with multilobar pneumonia with concomitant  bronchiolitis. The cavitary foci may represent pneumatoceles or potentially  septic emboli, echocardiogram recommended when clinically able. Discussion patient has gurgling respirations has pharyngeal dysphagia is chronically aspirating. Initial x-ray was suspicious for gastric distention as well very likely has gastroparesis with reflux and aspiration on that. Chest x-ray and CT show bibasilar infiltrates right greater than left consistent with aspiration and there is a cavitary area in the left lung which suggest this is a chronic process. She is on Zosyn I agree with that. Agree n.p.o. status with feeding tube. She likely needs a PEG tube  10/19 now in the ICU due to respiratory distress has a lot of upper airway noise with inability to control oral secretions. Nurses are requesting Robinul however abdominal x-ray is suggestive of ileus with fecal impaction. Robinul could possibly make this worse. Despite this we will place on small dose Robinul. We will give enema start Colace per tube. Nasal MRSA smear and blood culture are negative we will discontinue vancomycin. Potassium to be repleted IV. Sodium remains highly elevated but is minimally improved. 10/20 respirations nonlabored today upper airway noise absent. Will decrease dose Robinul as I am worried about ileus. Abdominal x-ray still suggest fecal impaction. She received an enema yesterday we will repeat today and add sorbitol per G-tube.   Would continue NG feedings  10/21 chest x-ray shows bilateral infiltrate with cavitary lesion NG tube is in the gastric fundal region  10/22 condition stable this morning saturation 100% on room air  Time of care 30 minutes    Fox Wakefield MD

## 2021-10-26 NOTE — ADT AUTH CERT NOTES
Comments  Comment     Last edited by  on  at    Patient Demographics    Patient Name   Jami Rivas   [id number] Legal Sex   Female    1985 Address   [address] [facility] [address] Phone   [phone] (Home)   Patient Demographics    Patient Name   Jami Rivas   [id number] Legal Sex   Female    1985 Address   [address] [facility] [address] Phone   [phone] (Home)   CSN:   [id number]   Admit Date: Admit Time Room Bed   Oct 17, 2021  2:36 PM [address]   Attending Providers    Provider Pager From To   Shirin Hernandez MD  10/17/21 10/17/21   Jerry Banks MD  10/17/21 10/18/21   Vicente Stewart MD  10/18/21 10/19/21   Marina Pires MD  10/19/21 10/23/21   Adalberto Quinn MD  10/23/21 10/26/21   iVcente Stewart MD  10/26/21    Emergency Contact(s)    Name Relation Home Work ASKERGenaro Other Relative [phone]   Carlos Alberto Vail Other Relative    Utilization Reviews       Pneumonia - Care Day 7 (10/23/2021) by Theo Lepe       Review Entered Review Status   10/26/2021 15:14 Completed      Criteria Review      Care Day: 7 Care Date: 10/23/2021 Level of Care: ICU    Guideline Day 3    Clinical Status    (X) * Hemodynamic stability    10/26/2021 15:14:49 EDT by Theo Lepe      101/70, 52, 16, 96.1, 93%    (X) * Afebrile or temperature acceptable for next level of care    (X) * Tachypnea absent    (X) * Hypoxemia absent    ( ) * Mental status at baseline    ( ) * Antibiotic regimen acceptable for next level of care    ( ) * Discharge plans and education understood    Activity    ( ) * Ambulatory or acceptable for next level of care    Routes    ( ) * Oral hydration    ( ) * Oral medications or regimen acceptable for next level of care    10/26/2021 15:14:49 EDT by Bibiana Chacon 3.375G IV Q8HR    ( ) * Oral diet or acceptable for next level of care Interventions    (X) * Oxygen absent or at baseline need    (X) * Isolation not indicated, or is performable at next level of care    * Milestone   Additional Notes   10/23      HOSPITALIST-   Assessment:   Multifocal bilateral pneumonia with cavitary lesion left lower lobe       Acute respiratory failure with hypoxia       Hypernatremia due to dehydration       Sepsis with tachycardia and elevated lactate.  Present on admission       Hypokalemia       Possible complicated UTI       Hereditary spinocerebellar ataxia with chronic debilitation       Adult failure to thrive       Severe protein calorie malnutrition       Severe constipation           Plan:   Continue supportive care including IV zosyn/vancomycin   Monitor routine electrolytes   Replete potassium   PEG tube placed , tolerating PEF feed. She is on room air,        By Dr. Jessica Bowers. Clinical updates provided to niece, Ms Giles Edwards explained to her the difficulties of attempting to place PEG.  We also discussed hospice versus comfort care but she is not inclined to hospice at this time. Kevin Earl would like to give cousin a chance.              GI-   Assessment:       Active Problems:     Acute respiratory failure ([address] (10/17/2021)         Failure to thrive in adult (10/17/2021)         Multifocal pneumonia (10/17/2021)         Severe protein-calorie malnutrition ([address] (10/18/2021)       Had aspiration, chronic, malnutrition, due to  Poor in taking, calori deficiency,       Has been on NG tube feeding since admission,   X-ray showed ileus with fecal impaction,       Status post peg  tube placement   Plan:   Antibiotic treatment for pneumonia   Continue IV replacement           PEG tube feeding           Nutrition follow-up for feeding rate                  Pneumonia - Care Day 6 (10/22/2021) by Senait Wade       Review Entered Review Status   10/22/2021 14:16 Completed      Criteria Review      Care Day: 6 Care Date: 10/22/2021 Level of Care: Inpatient Floor    Guideline Day 3    Level Of Care    (X) Floor to discharge    10/22/2021 14:16:33 EDT by Jennifer Fuentes      Remote Telemetry unit    Clinical Status    ( ) * Hemodynamic stability    ( ) * Afebrile or temperature acceptable for next level of care    (X) * Tachypnea absent    (X) * Hypoxemia absent    ( ) * Mental status at baseline    ( ) * Antibiotic regimen acceptable for next level of care    ( ) * Discharge plans and education understood    Activity    ( ) * Ambulatory or acceptable for next level of care    Routes    ( ) * Oral hydration    ( ) * Oral medications or regimen acceptable for next level of care    ( ) * Oral diet or acceptable for next level of care    Interventions    (X) * Oxygen absent or at baseline need    (X) * Isolation not indicated, or is performable at next level of care    * Milestone   Additional Notes    10/22/21 - Remote Telemetry unit      Wt: 34kg      96.2-59-/87, 98% on RA      D5 at 75cc/hr, Duo nebs 3cc Q6h W/A, Lovenox 40mg QD, Colace 100mg PO BID, Robinul 0.5mg PO BID, Remeron 15mg QHS, Zosyn 3.375g IV Q8h, KCL 20meq PO TID, Sorbitol 70% 30cc PO QD, all oral medications given via NGT      Orders: PICC line placement, wound care per orders, Standard with fiber at 35cc/hr with 145cc water flush Q4h via NGT, oximetry spot check prn, float heels, activity as tolerated with assistance      Internal Medicine progress note 10/22/21:      Subjective:   Patient is seen for follow-up. Lisa Clark is a 59-year-old female with hereditary spinocerebellar ataxia who is chronically debilitated, presented the ED last night with onset of gurgling and respiratory distress.  Patient was noted to be nonverbal, chronically ill looking, contracted and in respiratory distress.  Initially oxygen saturations were in the 80s.  She was placed on a nonrebreather.   CTA of the chest showed multi focal airspace disease and a small cavitary lesion in the left lower lobe.  Patient was admitted and started on Zosyn.  Family requested DNR CODE STATUS although did agree to intubation if needed       Patient seen in ICU.  Resting comfortably in no respiratory distress.  On room air.        General: Unresponsive, cachectic and emaciated   Lungs:   Rhonchi bilateral   Chest wall: No tenderness or deformity. Heart: Regular rate and rhythm, S1, S2 normal, no murmur, click, rub or gallop. Abdomen:   Soft, non-tender. Bowel sounds normal. No masses,  No organomegaly. Extremities: Contracted   Pulses: 2+ and symmetric all extremities. Skin: Skin color, texture, turgor normal. No rashes or lesions   Neurologic: CNII-XII intact.  Contractures of upper and lower extremities      Assessment:   Multifocal bilateral pneumonia with cavitary lesion left lower lobe       Acute respiratory failure with hypoxia       Hypernatremia due to dehydration       Sepsis with tachycardia and elevated lactate.  Present on admission       Hypokalemia       Possible complicated UTI       Hereditary spinocerebellar ataxia with chronic debilitation       Adult failure to thrive       Severe protein calorie malnutrition       Severe constipation           Plan:   Continue supportive care including IV zosyn/vancomycin   Monitor routine electrolytes   Replete potassium   Continue NG tube feeds   Transfer to medical tele floor       Prognosis appears guarded       Clinical updates provided to niece, Ms LaiKearnyalejandra Carnes explained to her the difficulties of attempting to place PEG.   We also discussed hospice versus comfort care but she is not inclined to hospice at this time. Melissa Mcfarland would like to give cousin a chance.              Pneumonia - Care Day 5 (10/21/2021) by Dustin Cox       Review Entered Review Status   10/21/2021 14:55 Completed      Criteria Review      Care Day: 5 Care Date: 10/21/2021 Level of Care: Telemetry    Guideline Day 3    Clinical Status    (X) * Hemodynamic stability    10/21/2021 14:55:07 EDT by Dustin Cox   VSS    (X) * Afebrile or temperature acceptable for next level of care    10/21/2021 14:55:07 EDT by Stephen Boyer      Temp 95.9    ( ) * Tachypnea absent    10/21/2021 14:55:07 EDT by Stephen Boyer      RR 22, 23, 21    (X) * Hypoxemia absent    10/21/2021 14:55:07 EDT by Stephen Boyer      99% on RA    ( ) * Mental status at baseline    10/21/2021 14:55:07 EDT by Stephen Boyer      General: Tyrell Leota, cachectic and emaciated    ( ) * Antibiotic regimen acceptable for next level of care    10/21/2021 14:55:07 EDT by Sammie Ware 3.375g IV q8    ( ) * Discharge plans and education understood    Activity    ( ) * Ambulatory or acceptable for next level of care    10/21/2021 14:55:07 EDT by Stephen Boyer      unresponsive    Routes    ( ) * Oral hydration    10/21/2021 14:55:07 EDT by Neo Cross adult tube feedings, npo    ( ) * Oral medications or regimen acceptable for next level of care    10/21/2021 14:55:07 EDT by Heavenly Cosme and IV meds    ( ) * Oral diet or acceptable for next level of care    10/21/2021 14:55:07 EDT by Stephen Boyer      adult tube feedings, npo    Interventions    (X) * Oxygen absent or at baseline need    10/21/2021 14:55:07 EDT by Stephen Boyer      99% on RA    (X) * Isolation not indicated, or is performable at next level of care    10/21/2021 14:55:07 EDT by Stephen Boyer      none noted    (X) WBC    10/21/2021 14:55:07 EDT by Stephen Boyer      WBC: 9.0    Medications    ( ) Oral antibiotics    10/21/2021 14:55:07 EDT by Stephen Boyer      Zosyn 3.375g IV q8    * Milestone   Additional Notes   Date of care: 10/21/2021      IP - LOC-Telemetry      PD PN: IMPRESSION:   1. Acute hypoxic respiratory failure back on room air   2. Aspiration pneumonia chest x-ray improving   3. Pharyngeal dysphagia   4. Possible gastroparesis with reflux   5. Fecal impaction with ileus   6.  Ileus has bowel sounds   7. Altered mental status   8. Spinocerebellar ataxia   9. Severe hypernatremia labs pending   10. Severe hypokalemia labs pending   11. Severe malnutrition   10. Body mass index is 12.1 kg/m². RECOMMENDATIONS/PLAN:   1. Leave on room air NG tube in place   2. Maintain n.p.o. status. 3. Will reconsult speech   4. I think she needs a PEG tube   5. Continue IV D5W to correct hypernatremia decrease rate   6. Abdominal x-ray to me suggest ileus with fecal impaction      GI PN: Plan:   Antibiotic treatment for pneumonia   Continue IV replacement   Patient will back to ICU for further evaluation,   If patient is a stable respiratory cardiovascular wise, if her power of  agree, may place a PEG tube and EGD guidance but not today since she is not stable. Hospitalist PN: Patient seen in ICU.  Resting comfortably in no respiratory distress.  On room air.     General: Unresponsive, cachectic and emaciated   Lungs:   Rhonchi bilateral   Chest wall: No tenderness or deformity. Heart: Regular rate and rhythm, S1, S2 normal, no murmur, click, rub or gallop. Abdomen:   Soft, non-tender. Bowel sounds normal. No masses,  No organomegaly. Extremities: Contracted   Pulses: 2+ and symmetric all extremities.    Skin: Skin color, texture, turgor normal. No rashes or lesions   Neurologic: CNII-XII intact.  Contractures of upper and lower extremities   Assessment:   Multifocal bilateral pneumonia with cavitary lesion left lower lobe   Acute respiratory failure with hypoxia   Hypernatremia due to dehydration   Sepsis with tachycardia and elevated lactate.  Present on admission   Hypokalemia   Possible complicated UTI   Hereditary spinocerebellar ataxia with chronic debilitation   Adult failure to thrive   Severe protein calorie malnutrition   Severe constipation   Plan:   Continue supportive care including IV zosyn/vancomycin   Monitor routine electrolytes   Replete potassium   Continue NG tube feeds   Transfer to medical tele floor   Prognosis appears guarded      Vitals: Temp 95.9, HR 78, /96, RR 22, 23, 21, 99% on RA      Labs:   10/21/2021 04:00   NRBC: 0.2 (H)   RBC: 4.61   HGB: 12.9   HCT: 40.5   MCHC: 31.9   RDW: 13.5   NEUTROPHILS: 77 (H)   LYMPHOCYTES: 19   MONOCYTES: 3 (L)   IMMATURE GRANULOCYTES: 1 (H)   ABSOLUTE NRBC: 0.02 (H)   ABS. NEUTROPHILS: 6.9   ABS. IMM. GRANS.: 0.1 (H)   ABS. LYMPHOCYTES: 1.7   Sodium: 147 (H)   Potassium: 3.6   Chloride: 112 (H)   Glucose: 92   BUN: 6   Creatinine: <0.15 (L)   BUN/Creatinine ratio: Not calculated   Calcium: 7.9 (L)   Phosphorus: 1.4 (L)   Albumin: 1.8 (L)   Chest x-ray: Multifocal bilateral airspace disease consistent with pneumonia again noted. Bilateral lung cavitary lesions better seen on prior CTA chest.   Lungs remain underexpanded particularly the left. No pneumothorax. Heart margins obscured, without overt cardiomegaly. NG tube tip over gastric fundal region. Interval gaseous distention of bowel throughout the included upper abdomen. Consider CT abdomen and pelvis correlation if clinically indicated. Chest x-ray: NG tube remains   Essentially clear lungs. No effusion or pneumothorax. Normal heart and stable   mediastinum.  Diffuse bowel distention again evident      Medications:    Duo-neb 2.5-0.5mg/3mL nebulization q4   D5 75mL/hr IV   Colace 100mg G tube BID   Lovenox 40mg SC daily   Robinul 0.5mg G tube BID   Zosyn 3.375g IV q8   Potassium chloride 20mEq NG tube TID with meals   Sorbitol 70% solution G tube daily   Solu-medrol 100mg IV x1      Plan: adult tube feedings, npo, activity as tolerated with assist, maintain heels off of bed at all times, NG tube, specialty bed, turn or assist with turn q2   Comment     Last edited by  on  at    Patient Demographics    Patient Name   Vic Gaspar   [id number] Legal Sex   Female    1985 Address   [address] [place] [address] Phone   [phone] (Home)   Consult Notes    Consults by Koko Paredes RD at 10/25/21 1012  Version 1 of 1  Author: Koko Paredes RD Service: Nutrition Author Type: Registered Dietitian   Filed: 10/25/21 1037 Date of Service: 10/25/21 1012 Status: Signed   : Koko Paredes RD (Registered Dietitian)      Comprehensive Nutrition Assessment     Type and Reason for Visit: Reassess (Goal)     Nutrition Recommendations/Plan:   As PEG placed and available for use  Reinitiate Jevity 1.5 via PEG at goal of 35ml/hr, continuous  Flush with 145ml q4h or per MD     Order provides 1260kcal (95%), 54g pro (108%), and 1500ml (100%)      Replete lytes as prn   Obtain DAILY K, Mg, and Phos     Document TF formula, TF rate, flushes, and BM in I/O's     As pt is d/c rec'd   Jevity 1.5 via PEG at 220ml, 4x daily, bolus  Flush with 205ml after each feed or per MD      Provides 1320kcal (100%), 56g pro (112%), and 1500ml (100%)      Nutrition Assessment:  Admitted for sudden onset of gurgling and respiratory distress witnessed by family. Patient is nonverbal, chronically ill looking; MD rec'd palliative but family would like to continue care. Worsening resp status so transferred to ICU. NG placed for nutrition (10/18); PEG placed on 10/23. TF initiated (10/18) and slowly advanced to goal. Major concern for refeeding syndrome, but no lytes labs or IV thiamin initiated per RD recs. RD re consulted today for bolus TF recs. Labs: Glu POC . No new labs obtained since 10/21.  Meds: D5 at 75ml/hr, colace, enoxaparin, zosyn, KCl, IVF, sorbitol.      Malnutrition Assessment:  Malnutrition Status:  Severe malnutrition    Context:  Chronic illness     Findings of the 6 clinical characteristics of malnutrition:   Energy Intake:  No significant decrease in energy intake  Weight Loss:  Unable to assess     Body Fat Loss:  7 - Severe body fat loss, Fat overlying ribs, Orbital, Triceps   Muscle Mass Loss:  7 - Severe muscle mass loss, Calf (gastrocnemius), Clavicles (pectoralis &deltoids), Hand (interosseous), Thigh (quadriceps), Temples (temporalis)  Fluid Accumulation:  No significant fluid accumulation,       Estimated Daily Nutrient Needs:  Energy (kcal): 1320kcal (40kcal/kg); Weight Used for Energy Requirements: Current  Protein (g): 50g (1.5g/kg); Weight Used for Protein Requirements: Current  Fluid (ml/day): 1500ml; Method Used for Fluid Requirements: 1 ml/kcal     Nutrition Related Findings:  NFPE showing severe wasting throughout body. Extremely cachetic. No known N/V. Noted constipation- no BM until colace and sorbitol added. Last BM 10/24, loose. No edema.       Wounds:    None        Current Nutrition Therapies:  DIET NPO  ADULT TUBE FEEDING Nasogastric; Standard with Fiber; Delivery Method: Continuous; Continuous Initial Rate (mL/hr): 10; Continuous Advance Tube Feeding: Yes; Advancement Volume (mL/hr): 10; Advancement Frequency: Q 12 hours; Continuous Goal Rate (m. ..     Anthropometric Measures:  · Height:  5' 6\" (167.6 cm)  · Current Body Wt:  32.9 kg (72 lb 8.5 oz)   · Usual Body Wt:  31.3 kg (69 lb)     · Ideal Body Wt:  130 lbs:  55.8 %   · BMI Category:  Underweight (BMI less than 22) age over 72        Nutrition Diagnosis:   · Inadequate oral intake related to cognitive or neurological impairment as evidenced by BMI, severe muscle loss, severe loss of subcutaneous fat     Nutrition Interventions:   Food and/or Nutrient Delivery: Continue tube feeding  Nutrition Education and Counseling: Education not indicated  Coordination of Nutrition Care: Continue to monitor while inpatient     Goals:  Pt to meet >75% of EEN via NGT within 3 days. Wt gain +0.5kg/week.  Lytes WNL.        Nutrition Monitoring and Evaluation:   Behavioral-Environmental Outcomes: None identified  Food/Nutrient Intake Outcomes: Enteral nutrition intake/tolerance  Physical Signs/Symptoms Outcomes: Biochemical data, Weight, Nutrition focused physical findings     Discharge Planning:    Enteral nutrition      Electronically signed by Bertram Ruth RD on 10/25/2021 at 10:13 AM     Contact: [phone]            Consults by Manas Garrett MD at 10/20/21 0126  Version 1 of 1  Author: Manas Garrett MD Service: Gastroenterology Author Type: Physician   Filed: 10/20/21 3297 Date of Service: 10/20/21 1508 Status: Signed   : Manas Garrett MD (Physician)   Consult Orders   1. IP CONSULT TO GASTROENTEROLOGY [[id number]] ordered by Ant Cantu MD at 10/20/21 4462   Expand AllCollapse All            Consult     Patient: Miguelangel Guy MRN: [id number]  SSN: [id number]    YOB: 1985  Age: 39 y.o. Sex: female       Subjective:      Miguelangel Guy is a 39 y.o. female who is being seen for difficult to feeding, possible PEG tube placement needed. History from medical records  Patient with cerebral ataxia with some chronic difficulty swallowing, normally followed by [facility].  Presented hospital with respiratory distress, CTA showed multiple airspace disease, patient made hospital for antibiotic treatment for aspiration pneumonia sepsis, had NG tube placed, GI consultation for PEG tube placement, patient not improving history, history from medical records, patient has transferred to medical unit today, now patient had more issue with breathing on the medical unit, patient will be transferred back to the hospital in ICU for further evaluation,       Past Medical History:   Diagnosis Date    Cerebellar ataxia ([address]              Past Surgical History:   Procedure Laterality Date    VT BREAST SURGERY PROCEDURE UNLISTED         breast reduction      History reviewed. No pertinent family history.   Social History           Tobacco Use    Smoking status: Never Smoker    Smokeless tobacco: Never Used   Substance Use Topics    Alcohol use: Never                Current Facility-Administered Medications   Medication Dose Route Frequency Provider Last Rate Last Admin    sorbitoL 70 % solution 30 mL  30 mL Per G Tube DAILY Kenisha Aguilar MD   30 mL at 10/20/21 9734    glycopyrrolate (ROBINUL) tablet 0.5 mg  0.5 mg Per G Tube BID Kenisha Aguilar MD        sodium phosphate (FLEET'S) enema 1 Enema  1 Enema Rectal NOW Kenisha Aguilar MD        docusate (COLACE) 50 mg/5 mL oral liquid 100 mg  100 mg Per G Tube BID Kenisha Aguilar MD   100 mg at 10/20/21 5327    dextrose 5% infusion  75 mL/hr IntraVENous CONTINUOUS Kenisha Aguilar MD 75 mL/hr at 10/20/21 0928 75 mL/hr at 10/20/21 0928    potassium chloride (KLOR-CON) packet for solution 20 mEq  20 mEq Per NG tube TID WITH MEALS Kavya Montemayor MD   20 mEq at 10/20/21 3572    [Held by provider] mirtazapine (REMERON) tablet 15 mg  15 mg Oral QHS Omero Roland MD        sodium chloride (NS) flush 5-40 mL  5-40 mL IntraVENous Q8H Omero Roland MD   10 mL at 10/20/21 0646    sodium chloride (NS) flush 5-40 mL  5-40 mL IntraVENous PRN Chidi Storey MD        acetaminophen (TYLENOL) tablet 650 mg  650 mg Oral Q6H PRN Chidi Storey MD         Or    acetaminophen (TYLENOL) suppository 650 mg  650 mg Rectal Q6H PRN Omero Roland MD        polyethylene glycol (MIRALAX) packet 17 g  17 g Oral DAILY PRN Omero Roland MD        ondansetron (ZOFRAN ODT) tablet 4 mg  4 mg Oral Q8H PRN Omero Roland MD         Or    ondansetron (ZOFRAN) injection 4 mg  4 mg IntraVENous Q6H PRN Omero Roland MD        enoxaparin (LOVENOX) injection 40 mg  40 mg SubCUTAneous DAILY Omero Roland MD   40 mg at 10/20/21 0928    piperacillin-tazobactam (ZOSYN) 3.375 g in 0.9% sodium chloride (MBP/ADV) 100 mL MBP  3.375 g IntraVENous Q8H Omero Roland MD 25 mL/hr at 10/20/21 0928 3.375 g at 10/20/21 0928         No Known Allergies     Review of Systems:  Review of Systems   Unable to perform ROS: Mental acuity         Objective:             Vitals:     10/20/21 0600 10/20/21 0700 10/20/21 0800 10/20/21 0935   BP: 117/84 105/79 112/86 115/86   Pulse: 68 (!) 58 67 70   Resp: 18 15 15 15   Temp:           SpO2: 100% 100% 100% 100%   Weight:           Height:                 Physical Exam:  Physical Exam  Constitutional:       Appearance: She is ill-appearing. HENT:      Head: Normocephalic. Mouth/Throat:      Mouth: Mucous membranes are dry. Eyes:      General: No scleral icterus. Pupils: Pupils are equal, round, and reactive to light. Cardiovascular:      Rate and Rhythm: Regular rhythm. Pulmonary:      Breath sounds: Wheezing and rhonchi present. Abdominal:      General: Abdomen is flat. Tenderness: There is no abdominal tenderness. Musculoskeletal:         General: Deformity present. Cervical back: Normal range of motion. Skin:     Findings: No bruising.          Recent Results         Recent Results (from the past 24 hour(s))   GLUCOSE, POC     Collection Time: 10/19/21  6:11 PM   Result Value Ref Range     Glucose (POC) 110 65 - 117 mg/dL     Performed by Norma IBRAHIM     BLOOD GAS, ARTERIAL     Collection Time: 10/20/21  5:45 AM   Result Value Ref Range     pH 7.42 7.35 - 7.45       PCO2 40 35 - 45 mmHg     PO2 115 (H) 75 - 100 mmHg     O2 SAT 97 >95 %     BICARBONATE 25 22 - 26 mmol/L     BASE EXCESS 0.9 0 - 2 mmol/L     O2 METHOD Nasal Cannula       O2 FLOW RATE 2.0 L/min     SITE Left Radial       HUGO'S TEST PASS     CBC WITH AUTOMATED DIFF     Collection Time: 10/20/21  8:23 AM   Result Value Ref Range     WBC 14.1 (H) 3.6 - 11.0 K/uL     RBC 4.06 3.80 - 5.20 M/uL     HGB 11.5 11.5 - 16.0 g/dL     HCT 36.4 35.0 - 47.0 %     MCV 89.7 80.0 - 99.0 FL     MCH 28.3 26.0 - 34.0 PG     MCHC 31.6 30.0 - 36.5 g/dL     RDW 13.6 11.5 - 14.5 %     PLATELET 730 345 - 083 K/uL     NRBC 0.1 (H) 0.0  WBC     ABSOLUTE NRBC 0.02 (H) 0.00 - 0.01 K/uL     NEUTROPHILS 89 (H) 32 - 75 %     LYMPHOCYTES 10 (L) 12 - 49 %     MONOCYTES 1 (L) 5 - 13 %     EOSINOPHILS 0 0 - 7 %     BASOPHILS 0 0 - 1 %     IMMATURE GRANULOCYTES 0 %     ABS.  NEUTROPHILS 12.6 (H) 1.8 - 8.0 K/UL     ABS. LYMPHOCYTES 1.4 0.8 - 3.5 K/UL     ABS. MONOCYTES 0.1 0.0 - 1.0 K/UL     ABS. EOSINOPHILS 0.0 0.0 - 0.4 K/UL     ABS. BASOPHILS 0.0 0.0 - 0.1 K/UL     ABS. IMM. GRANS. 0.0 K/UL     DF Smear Scanned       RBC COMMENTS Coldiron cells  1+        MAGNESIUM     Collection Time: 10/20/21  8:23 AM   Result Value Ref Range     Magnesium 1.8 1.6 - 2.4 mg/dL   RENAL FUNCTION PANEL     Collection Time: 10/20/21  8:23 AM   Result Value Ref Range     Sodium 144 136 - 145 mmol/L     Potassium 3.2 (L) 3.5 - 5.1 mmol/L     Chloride 113 (H) 97 - 108 mmol/L     CO2 25 21 - 32 mmol/L     Anion gap 6 5 - 15 mmol/L     Glucose 170 (H) 65 - 100 mg/dL     BUN 10 6 - 20 mg/dL     Creatinine 0.20 (L) 0.55 - 1.02 mg/dL     BUN/Creatinine ratio 50 (H) 12 - 20       GFR est AA >60 >60 ml/min/1.73m2     GFR est non-AA >60 >60 ml/min/1.73m2     Calcium 8.0 (L) 8.5 - 10.1 mg/dL     Phosphorus 1.1 (L) 2.6 - 4.7 mg/dL     Albumin 1.6 (L) 3.5 - 5.0 g/dL            XR CHEST PORT   Final Result       XR ABD (KUB)   Final Result       XR ABD PORT  1 V   Final Result       XR CHEST PORT   Final Result   FINDINGS: IMPRESSION: Single frontal view of the chest. Patient's left hand   obscures the left lung/hemithorax.       Enteric tube distal tip below left hemidiaphragm.       Normal heart size.       Similar right perihilar and upper lobe airspace disease. No sizable pleural   effusion or pneumothorax.       XR CHEST PORT   Final Result   Slightly retracted enteric tube, otherwise unchanged.       XR CHEST PORT   Final Result   1. Enteric tube as above. 2. Persistent multifocal airspace disease with a cavitary lesion in the left   lateral lung.       CTA CHEST W OR W WO CONT   Final Result   Negative for pulmonary embolus.        Findings the lungs consistent with multilobar pneumonia with concomitant   bronchiolitis.  The cavitary foci may represent pneumatoceles or potentially   septic emboli, echocardiogram recommended when clinically able.        XR CHEST PORT   Final Result   1. Large right perihilar opacity, mass versus airspace disease. Further   characterization with CT is recommended. 2. Nodular opacity in the left midlung zone. Further characterization with CT is   recommended.       XR NECK SOFT TISSUE   Final Result   No significant soft tissue swelling or radiodense foreign body.       XR CHEST PORT    (Results Pending)      Assessment:                 Hospital Problems  Never Reviewed         Codes Class Noted POA     Severe protein-calorie malnutrition ([facility] ICD-10-CM: E43  ICD-9-CM: 532   10/18/2021 Unknown           Acute respiratory failure (HCC) ICD-10-CM: J96.00  ICD-9-CM: 518.81   10/17/2021 Unknown           Failure to thrive in adult ICD-10-CM: R62.7  ICD-9-CM: 718. 7   10/17/2021 Unknown           Multifocal pneumonia ICD-10-CM: J18.9  ICD-9-CM: 5   10/17/2021 Unknown             Had aspiration, chronic, malnutrition, due to  Poor in taking, calori deficiency,     Has been on NG tube feeding since admission,  X-ray showed ileus with fecal impaction,   hypokalemia, continue on the  Plan:   Antibiotic treatment for pneumonia  Continue IV replacement  Patient will back to ICU for further evaluation,  If patient is a stable respiratory cardiovascular wise, if her power of  agree, may place a PEG tube and EGD guidance. The indication, risks, options, limitations will be discussed in detail  Signed By: Mara Rosales MD      October 20, 2021          Thank you for allowing me to participate in this patients care  Cc Referring Physician   Unknown, MD Glen          Consults by Guanakito Recio MD at 10/18/21 1420  Version 1 of 1  Author: Guanakito Recio MD Service: Pulmonary Disease Author Type: Physician   Filed: 10/18/21 1427 Date of Service: 10/18/21 1420 Status: Signed   : Guanakito Recio MD (Physician)   Consult Orders   1.  IP CONSULT TO PULMONOLOGY [id number] ordered by Yanet Vargas MD at 10/17/21 2015    Consult  Pulmonary, Critical Care     Name: Mary Vernon MRN: [id number]   : 1985 Hospital: [address] [place] [address]   Date: 10/18/2021  Admission date: 10/17/2021 Hospital Day: 2         Subjective/Interval History:   Patient seen on the medical floor. Has best I can understand she is bedbound nonverbal but does eat orally. In any event she has had gurgling respirations were brought to the emergency room has bilateral pneumonia right base greater than left base with a cavitary area in the left lower lung pleural base. She is normally followed at [facility] Problems  Never Reviewed         Codes Class Noted POA     Severe protein-calorie malnutrition ([facility] ICD-10-CM: E43  ICD-9-CM: 617   10/18/2021 Unknown           Acute respiratory failure (HCC) ICD-10-CM: J96.00  ICD-9-CM: 518.81   10/17/2021 Unknown           Failure to thrive in adult ICD-10-CM: R62.7  ICD-9-CM: 783. 7   10/17/2021 Unknown           Multifocal pneumonia ICD-10-CM: J18.9  ICD-9-CM: 486   10/17/2021 Unknown                   IMPRESSION: 1. Acute hypoxic respiratory failure  2. Aspiration pneumonia  3. Pharyngeal dysphagia  4. Possible gastroparesis with reflux  5. Altered mental status  6. Spinocerebellar ataxia  7. Severe hypernatremia  8. Severe hypo-Evangelina Leah  9. Severe malnutrition  10. Body mass index is 11.71 kg/m². 11.         RECOMMENDATIONS/PLAN: 1. Continue oxygen supplementation to maintain oxygen saturation greater than 90%  2. Maintain n.p.o. status. 3. Would ask speech to evaluate  4. I think she needs a PEG tube  5. Agree IV D5W to correct hypernatremia  6. Replace potassium  7.               [x]? High complexity decision making was performed  [x]? See my orders for details        Subjective/Initial History:      I was asked by Gigi Stinson MD to see Mary Vernon  a 39 y.o.    female in consultation for a chief complaint of acute hypoxic respiratory failure aspiration pneumonia with cavitation           No Known Allergies      MAR reviewed and pertinent medications noted or modified as needed          Current Facility-Administered Medications   Medication    dextrose 5% infusion    vancomycin (VANCOCIN) 750 mg in 0.9% sodium chloride 250 mL (VIAL-MATE)    Vancomycin - Pharmacy to Dose    [START ON 10/19/2021] Vancomycin - Random level to be drawn 10/19 @ 0400    potassium chloride (KLOR-CON) packet for solution 20 mEq    potassium chloride 10 mEq in 100 ml IVPB    [Held by provider] mirtazapine (REMERON) tablet 15 mg    [Held by provider] zolpidem (AMBIEN) tablet 5 mg    sodium chloride (NS) flush 5-40 mL    sodium chloride (NS) flush 5-40 mL    acetaminophen (TYLENOL) tablet 650 mg     Or    acetaminophen (TYLENOL) suppository 650 mg    polyethylene glycol (MIRALAX) packet 17 g    ondansetron (ZOFRAN ODT) tablet 4 mg     Or    ondansetron (ZOFRAN) injection 4 mg    enoxaparin (LOVENOX) injection 40 mg    piperacillin-tazobactam (ZOSYN) 3.375 g in 0.9% sodium chloride (MBP/ADV) 100 mL MBP      Patient PCP: Unknown, Provider, MD  PMH:  has a past medical history of Cerebellar ataxia ([facility] PSH:   has a past surgical history that includes pr breast surgery procedure unlisted. FHX: family history is not on file. SHX:  reports that she has never smoked. She has never used smokeless tobacco. She reports that she does not drink alcohol and does not use drugs.   Systemic review unobtainable as the patient is nonverbal        Objective:      Vital Signs: Telemetry:    normal sinus rhythm Intake/Output:   Visit Vitals  /74 (BP Patient Position: At rest;Lying)   Pulse 83   Temp 97.6 °F (36.4 °C)   Resp 18   Ht 5' 6\" (1.676 m)   Wt 32.9 kg (72 lb 8.5 oz)   LMP  (LMP Unknown)   SpO2 99%   BMI 11.71 kg/m²         Temp (24hrs), Av.2 °F (36.8 °C), Min:97.6 °F (36.4 °C), Max:99.7 °F (37.6 °C)         O2 Device: Nasal cannula O2 Flow Rate (L/min): 2 l/min              Wt Readings from Last 4 Encounters:   10/18/21 32.9 kg (72 lb 8.5 oz)           No intake or output data in the 24 hours ending 10/18/21 1420     Last shift:      No intake/output data recorded. Last 3 shifts: No intake/output data recorded.         Physical Exam:   General:  female; in bed profound malnutrition with upper airway noise  HEENT: NCAT, poor dentition, lips and mucosa dry  Eyes: anicteric; conjunctiva clear random eye movement present  Neck: no nodes, no JVD, no accessory MM use  Chest: no deformity,   Cardiac: Rapid regular rhythm  Lungs: Gurgling upper airway noise over the neck transmitted to the lungs but I believe she also has rales and rhonchi both lateral and lower areas  Abd: Thin emaciated bowel sounds present  Ext: no edema; no joint swelling; No clubbing  : clear urine  Neuro: Eyes are open does not look at voice has some random eye movement contracted extremities  Psych-nonverbal unable to assess  Skin: warm, dry, no cyanosis;   Pulses: Brachial radial femoral pulses intact  Capillary: Normal capillary refill     Labs:           Recent Labs     10/18/21  0303 10/17/21  1940 10/17/21  1445   WBC 9.6 4.3 10.2   HGB 14.6 14.2 16.9*    172 232            Recent Labs     10/18/21  0303 10/17/21  1940 10/17/21  1745   * 164*  164*  --    K 2.6* 2.4*  2.4*  --    * 126*  126*  --    CO2 26 23  23  --    * 93  93  --    BUN 35* 36*  36*  --    CREA 0.36* 0.25*  0.25*  --    CA 8.7 7.2*  7.2*  --    MG 2.3  --   --    LAC 2.2* 3.2*  3.2* 1.8   ALB  --  1.7*  --    ALT  --  13  --       COVID-19 antigen negative  Procalcitonin 2.21  Lab Results   Component Value Date/Time     Culture result: No growth after 2 hours 10/17/2021 05:30 PM      Imaging:             CXR Results  (Last 48 hours)                 10/17/21 1512   XR CHEST PORT Final result     Impression:   1. Large right perihilar opacity, mass versus airspace disease. Further   characterization with CT is recommended. 2. Nodular opacity in the left midlung zone. Further characterization with CT is   recommended.        Narrative:   AP portable chest, 1500 hours. Comparison: None. Findings: Multiple cardiac monitoring leads overlie the chest. The heart is   normal in size. There is an 8.4 cm right perihilar opacity. There is a 3.7 cm   opacity in the left midlung zone. There is no pulmonary vascular congestion. No   pleural effusion or pneumothorax is identified. The osseous structures are   unremarkable.                 Results from Hospital Encounter encounter on 10/17/21     XR NECK SOFT TISSUE     Narrative  Cervical soft tissues, 2 views.     Comparison: None.     Findings: The airway is grossly patent. The epiglottis is normal in size. No  radiodense foreign body is identified. There is normal alignment of the cervical  spine.     Impression  No significant soft tissue swelling or radiodense foreign body.        XR CHEST PORT     Narrative  AP portable chest, 1500 hours.     Comparison: None.     Findings: Multiple cardiac monitoring leads overlie the chest. The heart is  normal in size. There is an 8.4 cm right perihilar opacity. There is a 3.7 cm  opacity in the left midlung zone. There is no pulmonary vascular congestion. No  pleural effusion or pneumothorax is identified. The osseous structures are  unremarkable.     Impression  1. Large right perihilar opacity, mass versus airspace disease. Further  characterization with CT is recommended. 2. Nodular opacity in the left midlung zone.  Further characterization with CT is  recommended.     Results from Hospital Encounter encounter on 10/17/21     CTA CHEST W OR W WO CONT     Narrative  Chest CTA     TECHNIQUE: Multiple continuous axial images were obtained from the thoracic  inlet to the upper abdomen after the uneventful administration of intravenous  contrast. Reformatted images as well as maximum intensity projection images were  obtained in the sagittal and coronal planes.        Comment on dose reduction: All CT scans at this facility are performed using  dose reduction optimization technique as appropriate to perform the exam  including the following; automated exposure control, adjustments of the mA  and/or kV according to patient size, or use of iterative reconstructed  technique.     Comparison examination: Chest radiograph dated October 17, 2021     Findings:  LYMPHADENOPATHY: Mediastinal lymph nodes top normal in size, likely reactive to  the process in the lungs described below.     CARDIOVASCULAR: Negative for pulmonary embolus. Heart normal in size. Thoracic  aorta normal in caliber, negative for dissection. Great vessels patent.     LUNGS AND PLEURA: Tree-in-bud centrilobular micronodules diffusely throughout  the lungs with subpleural consolidation present right lower lobe and left  posterior basilar segment. Cavitary lesion with air-fluid level present left  lower lobe measuring approximately 21 mm, cavitary lesion with air-fluid level  medial segment middle lobe measuring 21 mm.     INCLUDED ABDOMEN: The visualized upper abdomen images normally.     CHEST WALL: No suspicious lytic or blastic lesion is identified.     Impression  Negative for pulmonary embolus.     Findings the lungs consistent with multilobar pneumonia with concomitant  bronchiolitis. The cavitary foci may represent pneumatoceles or potentially  septic emboli, echocardiogram recommended when clinically able.     Percussion patient has gurgling respirations has pharyngeal dysphagia is chronically aspirating. Initial x-ray was suspicious for gastric distention as well very likely has gastroparesis with reflux and aspiration on that. Chest x-ray and CT show bibasilar infiltrates right greater than left consistent with aspiration and there is a cavitary area in the left lung which suggest this is a chronic process.   She is on Zosyn I agree with that. Agree n.p.o. status with feeding tube. She likely needs a PEG tube     Oval MD Laura            Consults by Nhung Hayes RD at 10/18/21 1020  Version 2 of 2  Author: Nhung Hayes RD Service: Nutrition Author Type: Registered Dietitian   Filed: 10/18/21 3351 Date of Service: 10/18/21 1020 Status: Addendum   : Nhung Hayes [address] [place] [address] (Registered Dietitian)   Related Notes: Original Note by Nhung Hayes [address] [place] [address] (Registered Dietitian) filed at 10/18/21 6716      Comprehensive Nutrition Assessment     Type and Reason for Visit: Consult, Initial (Low BMI/TF recs)     Nutrition Recommendations/Plan:   Initiate Jevity 1.5 via NG at 10ml/hr, continuous  Advance by 10ml/hr q12h to goal of 35ml/hr  Flush with 145ml q4h or per MD     Order provides 1260kcal (95%), 54g pro (108%), and 1500ml (100%)     Start thiamin 911LF and folic acid 1mg via IV     Replete lytes as needed  Obtain DAILY K, Mg, and Phos     Document TF formula, TF rate, flushes, and BM in I/O's     Nutrition Assessment:  Admitted for sudden onset of gurgling and respiratory distress witnessed by family. On 2L NC. Patient is nonverbal, chronically ill looking. Family requests for feeding tube placement, GI consulted. Not appropriate for PEG at this time. Family wanting NG tube, despite MD rec'd of palliative care. Major concern for refeeding syndrome. Spoke to RN and MD about starting TF slowly and slowly advancing, and starting IVF thiamin and folic acid- both agreeable. Labs: Hct 49.1, Na 161, K 2.6, BUN 35, Cr 0.36, .  Meds: zosyn, KCl.      Malnutrition Assessment:  Malnutrition Status:  Severe malnutrition    Context:  Chronic illness     Findings of the 6 clinical characteristics of malnutrition:   Energy Intake:  Unable to assess  Weight Loss:  Unable to assess     Body Fat Loss:  7 - Severe body fat loss, Triceps, Orbital, Fat overlying ribs, Buccal region   Muscle Mass Loss:  7 - Severe muscle mass loss, Calf (gastrocnemius), Clavicles (pectoralis &deltoids), Hand (interosseous), Scapula (trapezius), Thigh (quadriceps), Temples (temporalis)  Fluid Accumulation:  No significant fluid accumulation,       Estimated Daily Nutrient Needs:  Energy (kcal): 1320kcal (40kcal/kg); Weight Used for Energy Requirements: Current  Protein (g): 50g (1.5g/kg); Weight Used for Protein Requirements: Current  Fluid (ml/day): 1500ml; Method Used for Fluid Requirements: Other (comment)     Nutrition Related Findings:  NFPE showing severe wasting throughout body. Extremely cachetic. No known N/V/D/C. SLP eval pending, currently NPO. No BM since admit. No edema.      Wounds:   None        Current Nutrition Therapies:  DIET NPO  ADULT TUBE FEEDING Nasogastric; Standard with Fiber; Delivery Method: Continuous; Continuous Initial Rate (mL/hr): 25; Continuous Advance Tube Feeding: Yes; Advancement Volume (mL/hr): 10; Advancement Frequency: Q 6 hours; Continuous Goal Rate (mL. ..     Anthropometric Measures:  · Height:  5' 6\" (167.6 cm)  · Current Body Wt:  32.9 kg (72 lb 8.5 oz)   · Ideal Body Wt:  130 lbs:  55.8 %   · BMI Category:  Underweight (BMI less than 22) age over 72        Nutrition Diagnosis:   · Inadequate oral intake related to cognitive or neurological impairment as evidenced by BMI, severe muscle loss, severe loss of subcutaneous fat     Nutrition Interventions:   Food and/or Nutrient Delivery: Start tube feeding  Nutrition Education and Counseling: Education not indicated  Coordination of Nutrition Care: Continue to monitor while inpatient     Goals:  Pt to meet >75% of EEN via NGT within 3 days. Wt gain +0.5kg/week.  Lytes WNL.        Nutrition Monitoring and Evaluation:   Behavioral-Environmental Outcomes: None identified  Food/Nutrient Intake Outcomes: Vitamin/mineral intake, Enteral nutrition intake/tolerance  Physical Signs/Symptoms Outcomes: Biochemical data, Weight, Nutrition focused physical findings     Discharge Planning: Too soon to determine      Electronically signed by Thee López RD on 10/18/2021 at 10:20 AM     Contact: [phone]            Consults by Bettina Wang RD at 10/18/21 1020  Version 1 of 2  Author: Bettina Wang RD Service: Nutrition Author Type: Registered Dietitian   Filed: 10/18/21 3636 Date of Service: 10/18/21 1020 Status: Signed   : Bettina Wang [address] [place] [address] (Registered Dietitian)   Related Notes: Addendum by Rachel Hurd [address] (Registered Dietitian) filed at 10/18/21 7166      Comprehensive Nutrition Assessment     Type and Reason for Visit: Consult, Initial (Low BMI/TF recs)     Nutrition Recommendations/Plan:   Initiate Jevity 1.5 via NG at 10ml/hr, continuous  Advance by 10ml/hr q8h to goal of 35ml/hr  Flush with 145ml q4h or per MD     Order provides 1260kcal (95%), 54g pro (108%), and 1500ml (100%)     Start thiamin 443KO and folic acid 1mg via IV     Replete lytes as needed  Obtain DAILY K, Mg, and Phos     Document TF formula, TF rate, flushes, and BM in I/O's     Nutrition Assessment:  Admitted for sudden onset of gurgling and respiratory distress witnessed by family. On 2L NC. Patient is nonverbal, chronically ill looking. Family requests for feeding tube placement, GI consulted. Not appropriate for PEG at this time. Family wanting NG tube, despite MD rec'd of palliative care. Major concern for refeeding syndrome. Spoke to RN and MD about starting TF slowly and slowly advancing, and starting IVF thiamin and folic acid- both agreeable. Labs: Hct 49.1, Na 161, K 2.6, BUN 35, Cr 0.36, .  Meds: zosyn, KCl.      Malnutrition Assessment:  Malnutrition Status:  Severe malnutrition    Context:  Chronic illness     Findings of the 6 clinical characteristics of malnutrition:   Energy Intake:  Unable to assess  Weight Loss:  Unable to assess     Body Fat Loss:  7 - Severe body fat loss, Triceps, Orbital, Fat overlying ribs, Buccal region   Muscle Mass Loss:  7 - Severe muscle mass loss, Calf Too soon to determine      Electronically signed by Stephany Odonnell RD on 10/18/2021 at 10:20 AM     Contact: [phone]

## 2021-10-26 NOTE — PROGRESS NOTES
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 Patient was noted to be nonverbal, chronically ill looking, contracted and in respiratory distress. Initially oxygen saturations were in the 80s. She was placed on a nonrebreather. CTA of the chest showed multi focal airspace disease and a small cavitary lesion in the left lower lobe. Patient was admitted and started on Zosyn. Family requested DNR CODE STATUS although did agree to intubation if needed Labs showed a sodium of 164 Patient was initially weaned to room air but on the evening of 10/19 she desaturated and was transferred to the ICU. She was quickly weaned back to room air. She was then transferred back to the floor. NG tube placed and she was started on tube feeds. GI was consulted for consideration of PEG tube On 10/24 patient redeveloped fever and became hypoxic again, was restarted on oxygen. Patient was transferred back again to the ICU. Patient required intubation early this morning emergently due to worsening respiratory status. PO2 was only 41 on nonrebreather mask Patient became hypotensive and was started on norepinephrine 
 
------ Patient is seen today for follow-up. She is on 100% FiO2. She remains unresponsive. She is on 13 mcg of norepinephrine. Chest x-ray did not show any significant change with her pneumonia Chest x-ray does show some dilated bowel loops in the left upper quadrant with left hemidiaphragm elevation Problem List: 
Problem List as of 10/26/2021 Never Reviewed Codes Class Noted - Resolved Severe protein[facility]calorie malnutrition [facility] ICD-10-CM: L95 ICD-9-CM: 294  10/18/2021 - Present  Acute respiratory failure (HCC) ICD-10-CM: J96.00 
ICD-9-CM: 518.81  10/17/2021 - Present Failure to thrive in adult ICD-10-CM: R62.7 ICD-9-CM: 783.7  10/17/2021 - Present Multifocal pneumonia ICD-10-CM: J18.9 ICD-9-CM: 955  10/17/2021 - Present Medications reviewed Current Facility-Administered Medications Medication Dose Route Frequency  succinylcholine (ANECTINE) 20 mg/mL injection  etomidate (AMIDATE) 2 mg/mL injection  NOREPINephrine (LEVOPHED) 8 mg in 5% dextrose 250mL (32 mcg/mL) infusion  0.5-16 mcg/min IntraVENous TITRATE  propofol (DIPRIVAN) 10 mg/mL infusion  0-50 mcg/kg/min IntraVENous TITRATE  zinc oxide-white petrolatum 17-57 % topical paste   Topical TID  albuterol-ipratropium (DUO-NEB) 2.5 MG-0.5 MG/3 ML  3 mL Nebulization Q6HWA RT  
 albuterol-ipratropium (DUO-NEB) 2.5 MG-0.5 MG/3 ML  3 mL Nebulization Q6H PRN  
 sorbitoL 70 % solution 30 mL  30 mL Per G Tube DAILY  glycopyrrolate (ROBINUL) tablet 0.5 mg  0.5 mg Per G Tube BID  docusate (COLACE) 50 mg/5 mL oral liquid 100 mg  100 mg Per G Tube BID  potassium chloride (KLOR-CON) packet for solution 20 mEq  20 mEq Per NG tube TID WITH MEALS  
 [Held by provider] mirtazapine (REMERON) tablet 15 mg  15 mg Oral QHS  sodium chloride (NS) flush 5-40 mL  5-40 mL IntraVENous Q8H  
 sodium chloride (NS) flush 5-40 mL  5-40 mL IntraVENous PRN  
 acetaminophen (TYLENOL) tablet 650 mg  650 mg Oral Q6H PRN Or  
 acetaminophen (TYLENOL) suppository 650 mg  650 mg Rectal Q6H PRN  polyethylene glycol (MIRALAX) packet 17 g  17 g Oral DAILY PRN  
 ondansetron (ZOFRAN ODT) tablet 4 mg  4 mg Oral Q8H PRN Or  
 ondansetron (ZOFRAN) injection 4 mg  4 mg IntraVENous Q6H PRN  
 enoxaparin (LOVENOX) injection 40 mg  40 mg SubCUTAneous DAILY  piperacillin-tazobactam (ZOSYN) 3.375 g in 0.9% sodium chloride (MBP/ADV) 100 mL MBP  3.375 g IntraVENous Q8H Review of Systems:  
Review of systems not obtained due to patient factors.  
 
Objective: Physical Exam:  
 
Visit Vitals BP (!) 79/56 (BP 1 Location: Left upper arm, BP Patient Position: At rest) Comment: levo increased Pulse (!) 102 Temp (!) 100.6 °F (38.1 °C) Resp 16 Ht 5' 6\" (1.676 m) Wt 34 kg (74 lb 15.3 oz) LMP  (LMP Unknown) SpO2 100% BMI 12.10 kg/m² O2 Flow Rate (L/min): 60 l/min O2 Device: Ventilator Temp (24hrs), Av.1 °F (37.3 °C), Min:98.1 °F (36.7 °C), Max:100.6 °F (38.1 °C) No intake/output data recorded. 10/24 190 - 10/26 0700 In: 580 Out: - General:   Unresponsive, cachectic and emaciated. Patient intubated Lungs:    Rhonchi bilateral  
Chest wall:  No tenderness or deformity. Heart:  Regular rate and rhythm, S1, S2 normal, no murmur, click, rub or gallop. Abdomen:   Soft, non-tender. Bowel sounds normal. No masses,  No organomegaly. Extremities:  Contracted Pulses: 2+ and symmetric all extremities. Skin: Skin color, texture, turgor normal. No rashes or lesions Neurologic: CNII-XII intact. Contractures of upper and lower extremities Data Review:  
   
Recent Days: 
Recent Labs 10/25/21 [address] [place] [address] WBC 6.4 HGB 13.0 HCT 39.8  Recent Labs 10/25/21 
1330   
K 4.8  
 CO2 31  BUN 3*  
CREA 0.25* CA 8.8 MG 2.0 PHOS 2.0* Recent Labs 10/26/21 
0720 10/26/21 
6670 10/26/21 
7158 PH 7.55* 7.54* 7.42  
PCO2 35 34* 46* PO2 300* 43* 41* HCO3 30* 30* 28* FIO2 100.0 100 100.0  
 
 
24 Hour Results: 
Recent Results (from the past 24 hour(s)) MAGNESIUM Collection Time: 10/25/21  1:30 PM  
Result Value Ref Range Magnesium 2.0 1.6 - 2.4 mg/dL METABOLIC PANEL, BASIC Collection Time: 10/25/21  1:30 PM  
Result Value Ref Range Sodium 142 136 - 145 mmol/L Potassium 4.8 3.5 - 5.1 mmol/L Chloride 108 97 - 108 mmol/L  
 CO2 31 21 - 32 mmol/L Anion gap 3 (L) 5 - 15 mmol/L Glucose 100 65 - 100 mg/dL BUN 3 (L) 6 - 20 mg/dL  Creatinine 0.25 (L) 0.55 - 1.02 mg/dL BUN/Creatinine ratio 12 12 - 20 GFR est AA >60 >60 ml/min/1.73m2 GFR est non-AA >60 >60 ml/min/1.73m2 Calcium 8.8 8.5 - 10.1 mg/dL PHOSPHORUS Collection Time: 10/25/21  1:30 PM  
Result Value Ref Range Phosphorus 2.0 (L) 2.6 - 4.7 mg/dL CBC WITH AUTOMATED DIFF Collection Time: 10/25/21  3:15 PM  
Result Value Ref Range WBC 6.4 3.6 - 11.0 K/uL  
 RBC 4.62 3.80 - 5.20 M/uL  
 HGB 13.0 11.5 - 16.0 g/dL HCT 39.8 35.0 - 47.0 % MCV 86.1 80.0 - 99.0 FL  
 MCH 28.1 26.0 - 34.0 PG  
 MCHC 32.7 30.0 - 36.5 g/dL  
 RDW 13.4 11.5 - 14.5 % PLATELET 369 567 - 011 K/uL MPV 10.1 8.9 - 12.9 FL  
 NRBC 0.0 0.0  WBC ABSOLUTE NRBC 0.00 0.00 - 0.01 K/uL NEUTROPHILS 62 32 - 75 % LYMPHOCYTES 32 12 - 49 % MONOCYTES 5 5 - 13 % EOSINOPHILS 0 0 - 7 % BASOPHILS 0 0 - 1 % IMMATURE GRANULOCYTES 1 (H) 0 - 0.5 % ABS. NEUTROPHILS 4.0 1.8 - 8.0 K/UL  
 ABS. LYMPHOCYTES 2.0 0.8 - 3.5 K/UL  
 ABS. MONOCYTES 0.3 0.0 - 1.0 K/UL  
 ABS. EOSINOPHILS 0.0 0.0 - 0.4 K/UL  
 ABS. BASOPHILS 0.0 0.0 - 0.1 K/UL  
 ABS. IMM. GRANS. 0.1 (H) 0.00 - 0.04 K/UL  
 DF AUTOMATED    
GLUCOSE, POC Collection Time: 10/25/21  9:19 PM  
Result Value Ref Range Glucose (POC) 112 65 - 117 mg/dL Performed by Alka Keller   
BLOOD GAS, ARTERIAL Collection Time: 10/26/21  3:44 AM  
Result Value Ref Range pH 7.42 7.35 - 7.45    
 PCO2 46 (H) 35 - 45 mmHg PO2 41 (LL) 75 - 100 mmHg O2 SAT 79 (LL) >95 % BICARBONATE 28 (H) 22 - 26 mmol/L  
 BASE EXCESS 4.4 (H) 0 - 2 mmol/L  
 O2 METHOD NRB mask O2 FLOW RATE 21 L/min FIO2 100.0 % Sample source Arterial    
 SITE Left Femoral    
 HUGO'S TEST PASS Critical value read back [facility] OLYA ODONNELL RN PAO2 41 CALLED AT 0411 BY JORY PRINCE CRT 10/26/21 BLOOD GAS, ARTERIAL Collection Time: 10/26/21  6:09 AM  
Result Value Ref Range pH 7.54 (H) 7.35 - 7.45    
 PCO2 34 (L) 35 - 45 mmHg  PO2 43 (LL) 75 - 100 mmHg  
 O2 SAT 85 (L) >95 % BICARBONATE 30 (H) 22 - 26 mmol/L  
 BASE EXCESS 6.7 (H) 0 - 2 mmol/L  
 FIO2 100 % EPAP/CPAP/PEEP 10 BLOOD GAS, ARTERIAL Collection Time: 10/26/21  7:20 AM  
Result Value Ref Range pH 7.55 (H) 7.35 - 7.45    
 PCO2 35 35 - 45 mmHg PO2 300 (H) 75 - 100 mmHg O2 SAT >100 >95 % BICARBONATE 30 (H) 22 - 26 mmol/L  
 BASE EXCESS 7.8 (H) 0 - 2 mmol/L  
 O2 METHOD VENT    
 FIO2 100.0 % MODE Assist Control/Volume Control Tidal volume 350 SET RATE 16    
 EPAP/CPAP/PEEP 12.0 SITE Right Radial    
 HUGO'S TEST PASS Critical value read back CVRB DR QUACH   
GLUCOSE, POC Collection Time: 10/26/21  7:33 AM  
Result Value Ref Range Glucose (POC) 70 65 - 117 mg/dL Performed by [facility] XR CHEST PORT Final Result Endotracheal tube in satisfactory position. Otherwise stable exam.   
  
  
  
  
XR CHEST PORT Final Result Elevation of left hemidiaphragm with atelectasis in left base. Increased density in the perihilar regions which may represent atelectasis  
and/or developing infiltrate. Aspiration could give this appearance. Benedetta Ou XR CHEST PORT Final Result Pulmonary hypoinflation. Unchanged cavitary lesion left lower lobe. XR CHEST PORT Final Result XR CHEST PORT Final Result XR CHEST PORT Final Result Feeding tube as above. No significant interval change. XR CHEST PORT Final Result FINDINGS: IMPRESSION: Single frontal view of the abdomen. Enteric tube passes below the left hemidiaphragm, coiled in the gastric fundus  
region. Normal heart size. Increased hypoinflation and bilateral pulmonary airspace pneumonia and/or  
atelectasis. Cavitary lesion left lower lobe as previous. No large pleural  
effusion or pneumothorax. No free air under the diaphragm. XR CHEST PORT Final Result XR ABD (KUB) Final Result XR ABD PORT  1 V Final Result XR CHEST PORT Final Result FINDINGS: IMPRESSION: Single frontal view of the chest. Patient's left hand  
obscures the left lung/hemithorax. Enteric tube distal tip below left hemidiaphragm. Normal heart size. Similar right perihilar and upper lobe airspace disease. No sizable pleural  
effusion or pneumothorax. XR CHEST PORT Final Result Slightly retracted enteric tube, otherwise unchanged. XR CHEST PORT Final Result 1. Enteric tube as above. 2. Persistent multifocal airspace disease with a cavitary lesion in the left  
lateral lung. CTA CHEST W OR W WO CONT Final Result Negative for pulmonary embolus. Findings the lungs consistent with multilobar pneumonia with concomitant  
bronchiolitis. The cavitary foci may represent pneumatoceles or potentially  
septic emboli, echocardiogram recommended when clinically able. XR CHEST PORT Final Result 1. Large right perihilar opacity, mass versus airspace disease. Further  
characterization with CT is recommended. 2. Nodular opacity in the left midlung zone. Further characterization with CT is  
recommended. XR NECK SOFT TISSUE Final Result No significant soft tissue swelling or radiodense foreign body. Assessment: 
Multifocal bilateral pneumonia with cavitary lesion left lower lobe 
 -On Zosyn day #9 Acute respiratory failure with hypoxia, now requiring mechanical ventilation. Patient intubated 10/26 Hypernatremia due to dehydration, improved Severe sepsis with septic shock with tachycardia and elevated lactate. Present on admission 
  -Now on pressors Hypokalemia. Repleted Possible complicated UTI. Urine culture was negative Hereditary spinocerebellar ataxia with chronic debilitation Adult failure to thrive Severe protein calorie malnutrition. Status post PEG tube this admission Possible adynamic ileus Plan: 
Continue mechanical ventilation Continue IV Zosyn Place PEG tube to low intermittent suction Overall prognosis appears poor. There does not appear to be any readily reversible pathology and I suspect her worsening condition is largely related to lack of respiratory muscle strength and patient tiring out. I suspect she will quickly become ventilator dependent. We will need to further discuss goals of care with family as I suspect they may have some unrealistic expectations regarding her prognosis, from a previous discussion with them Disposition: Continued inpatient care Total time spent with patient: 30 minutes.  
 
Scarlett Canavan, MD

## 2021-10-26 NOTE — PROGRESS NOTES
Patient was weaned down to a venti-mask 35% by respiratory ([place] Deep). Patient was 83% upon nurse arriving to the room. Patient was immediately placed on 45% venti- mask and deep suctioned. No improvement so patient was then placed back onto NRB at 100%. Still no improvement; patient was then deep suctioned. Respiratory ([place]isl[place] Deep) was called to the room where she was bagged and suctioned again. Still no improvement. Nurse placed an order for a chest x-ray and ABG. ABG 02 was 40.8. Critical was called to Dr. Petr Ayers with orders to intubated. Nurse called family and family wished to continue with intubation. Patient was intubated at [phone] and placed on the vent. Patient suctioned multiple times with a lot of clear/ blood-tinged sputumn coming up. Family now at bedside Patients blood pressure was 30s/20s. Patient was started on levophed and propfol. Post intubation ABG was drawn. Patient stable at this time. Will continue to monitor.

## 2021-10-26 NOTE — ANESTHESIA PROCEDURE NOTES
Emergent Intubation  Performed by: Cj Duran CRNA  Authorized by: Cj Duran CRNA     Emergent Intubation:   Location:  ICU  Date/Time:  10/26/2021 6:14 AM  Indications:  Respiratory failure  Spontaneous Ventilation: present    Level of Consciousness: sedated  Preoxygenated:  Yes      Airway Documentation:   Airway:  ETT - Cuffed  Technique:  Intubating stylet  Insertion Site:  Oral  Blade Type:  Katie  Blade Size:  3  ETT size (mm):  7.0  ETT Line Dave:  Teeth  ETT Insertion depth (cm):  21  Placement verified by: auscultation and EtCO2    Attempts:  1  Difficult airway: No

## 2021-10-26 NOTE — PROGRESS NOTES
Pulmonary, Critical Care Note    Name: Silvina Encarnacion MRN: [id number]   : 1985 Hospital: [address] [place] [address]   Date: 10/26/2021  Admission date: 10/17/2021 Hospital Day: 10       Subjective/Interval History:   Patient seen on the medical floor. Has best I can understand she is bedbound nonverbal but does eat orally. In any event she has had gurgling respirations were brought to the emergency room has bilateral pneumonia right base greater than left base with a cavitary area in the left lower lung pleural base. She is normally followed at [facility]  10/19 now in the ICU on room air. Apparently had worsening respiratory status last evening and had desaturation was transferred to the ICU has had frequent oral suctioning and is now back on room air  10/20 NG tube in place currently on room air saturation 98% looks at voice otherwise unresponsive  10/26 events noted patient was transferred to ICU now intubated on the ventilator she was put on 100% nonrebreather mask and then to Ventimask but she did not tolerate subsequently got intubated she was hemodynamically unstable on pressors now    Hospital Problems  Never Reviewed        Codes Class Noted POA    Severe protein-calorie malnutrition ([facility] [address] ICD-10-CM: E43  ICD-9-CM: 262  10/18/2021 Unknown        Acute respiratory failure ([facility] [address] ICD-10-CM: J96.00  ICD-9-CM: 518.81  10/17/2021 Unknown        Failure to thrive in adult ICD-10-CM: R62.7  ICD-9-CM: 783.7  10/17/2021 Unknown        Multifocal pneumonia ICD-10-CM: J18.9  ICD-9-CM: 486  10/17/2021 Unknown              IMPRESSION:   1. Acute hypoxic respiratory failure   2. Aspiration pneumonia   3. Hypothermia resolved  4. Hypovolemic shock  5. Pharyngeal dysphagia  6. Gastroesophageal reflux disease with gastroparesis  7. Fecal impaction with ileus  8. Ileus has bowel sounds  9. Severe malnutrition  10. We will start IV fluids  Body mass index is 12.1 kg/m². RECOMMENDATIONS/PLAN:   1.  Intubated on ventilator assist control mode 100% FiO2 350 tidal volume rate of 16 PEEP of 12 we will decrease FiO2 and decrease the rate  2. Will retract ET tube very close to the shahid will repeat chest x-ray which shows hilar infiltrate and distended gastric antrum and loops of bowel pushing the left hemidiaphragm up  3. On vasopressors hemodynamically unstable Levofed 10 rajendra  4. Patient was having more shortness of breath hypoxic episode received Solu-Medrol  5. Leave on room air NG tube in place for EGD  6. Will get KUB  7. Continue with Zosyn for ongoing aspiration             [x] High complexity decision making was performed  [x] See my orders for details      Subjective/Initial History:     I was asked by Dustin Metz MD to see Irvin Sue  a 39 y.o.    female in consultation for a chief complaint of acute hypoxic respiratory failure aspiration pneumonia with cavitation        No Known Allergies     MAR reviewed and pertinent medications noted or modified as needed     Current Facility-Administered Medications   Medication    succinylcholine (ANECTINE) 20 mg/mL injection    etomidate (AMIDATE) 2 mg/mL injection    NOREPINephrine (LEVOPHED) 8 mg in 5% dextrose 250mL (32 mcg/mL) infusion    propofol (DIPRIVAN) 10 mg/mL infusion    zinc oxide-white petrolatum 17-57 % topical paste    albuterol-ipratropium (DUO-NEB) 2.5 MG-0.5 MG/3 ML    albuterol-ipratropium (DUO-NEB) 2.5 MG-0.5 MG/3 ML    sorbitoL 70 % solution 30 mL    glycopyrrolate (ROBINUL) tablet 0.5 mg    docusate (COLACE) 50 mg/5 mL oral liquid 100 mg    potassium chloride (KLOR-CON) packet for solution 20 mEq    [Held by provider] mirtazapine (REMERON) tablet 15 mg    sodium chloride (NS) flush 5-40 mL    sodium chloride (NS) flush 5-40 mL    acetaminophen (TYLENOL) tablet 650 mg    Or    acetaminophen (TYLENOL) suppository 650 mg    polyethylene glycol (MIRALAX) packet 17 g    ondansetron (ZOFRAN ODT) tablet 4 mg    Or    ondansetron (ZOFRAN) injection 4 mg    enoxaparin (LOVENOX) injection 40 mg    piperacillin-tazobactam (ZOSYN) 3.375 g in 0.9% sodium chloride (MBP/ADV) 100 mL MBP      Patient PCP: Unknown, Provider, MD  PMH:  has a past medical history of Cerebellar ataxia ([address] PSH:   has a past surgical history that includes pr breast surgery procedure unlisted. FHX: family history is not on file. SHX:  reports that she has never smoked. She has never used smokeless tobacco. She reports that she does not drink alcohol and does not use drugs. Systemic review unobtainable as the patient is nonverbal      Objective:     Vital Signs: Telemetry:    normal sinus rhythm Intake/Output:   Visit Vitals  BP (!) 79/56 (BP 1 Location: Left upper arm, BP Patient Position: At rest) Comment: levo increased   Pulse (!) 102   Temp (!) 100.6 °F (38.1 °C)   Resp 16   Ht 5' 6\" (1.676 m)   Wt 34 kg (74 lb 15.3 oz)   LMP  (LMP Unknown)   SpO2 100%   BMI 12.10 kg/m²       Temp (24hrs), Av.1 °F (37.3 °C), Min:98.1 °F (36.7 °C), Max:100.6 °F (38.1 °C)        O2 Device: Ventilator O2 Flow Rate (L/min): 60 l/min       Wt Readings from Last 4 Encounters:   10/21/21 34 kg (74 lb 15.3 oz)          Intake/Output Summary (Last 24 hours) at 10/26/2021 0751  Last data filed at 10/25/2021 2328  Gross per 24 hour   Intake 435 ml   Output    Net 435 ml       Last shift:      No intake/output data recorded. Last 3 shifts: 10/24 1901 - 10/26 0700  In: 80   Out: -        Physical Exam:   General:  female; intubated on ventilator  HEENT: NCAT, poor dentition, lips and mucosa dry  Eyes: anicteric; conjunctiva clear random eye movement present  Neck: no nodes, no JVD, no accessory MM use  Chest: no deformity,   Cardiac: Regular rate and rhythm  Lungs: Diminished breath sounds at left side coarse breath sound on the right side  Abd: Thin emaciated bowel sounds present  Ext: no edema; no joint swelling;  No clubbing  : clear urine  Neuro: Eyes are open does not look at voice has some random eye movement contracted extremities  Psych-nonverbal unable to assess  Skin: warm, dry, no cyanosis;   Pulses: Brachial radial femoral pulses intact  Capillary: Normal capillary refill  Neuro contracted upper extremities    Labs:    Recent Labs     10/25/21  1515   WBC 6.4   HGB 13.0        Recent Labs     10/25/21  1330      K 4.8      CO2 31      BUN 3*   CREA 0.25*   CA 8.8   MG 2.0   PHOS 2.0*   10/20 on 2 L nasal oxygen PO2 115 PCO2 40 pH 7.42  10/20 room air oxygen saturation 98%     Nasal MRSA smear negative  Blood 1 of 4 bottles with gram-positive cocci  Urine culture no growth  COVID-19 antigen negative  Procalcitonin 2.21  Lab Results   Component Value Date/Time    Culture result: No significant growth, <10,000 CFU/mL 10/18/2021 11:15 AM    Culture result: (A) 10/17/2021 05:30 PM     Gram Positive Cocci CALLED TO AND READ BACK BY MELISSA [place] R.N. [id number] 10/19/2021 BY DPW    Culture result:  10/17/2021 05:30 PM     Staphylococcus species, coagulase negative growing in 1 of 4 bottles drawn NO SITE     Imaging:    CXR Results  (Last 48 hours)               10/19/21 0333  XR CHEST PORT Final result    Impression:  FINDINGS: IMPRESSION: Single frontal view of the chest. Patient's left hand   obscures the left lung/hemithorax. Enteric tube distal tip below left hemidiaphragm. Normal heart size. Similar right perihilar and upper lobe airspace disease. No sizable pleural   effusion or pneumothorax. To me I agree there is right upper lobe infiltrate there is also left basilar infiltrate with obscuration of the hemidiaphragm       Narrative:  Aspiration pneumonia. Comparison chest x-ray 10/18/2021.           10/18/21 2115  XR CHEST PORT Final result    Impression:  Slightly retracted enteric tube, otherwise unchanged. Narrative:  AP portable chest, 2108 hours.        Comparison: Earlier the same day at 2009 hours.       Findings: The enteric tube has been retracted slightly. The tip is in the region   of the gastric fundus. The sidehole remains below the diaphragm. Cardiac   monitoring leads overlie the chest.       The heart is normal in size. Multifocal airspace disease and a left-sided   cavitary lesion are again noted. There is no pleural effusion or pneumothorax. There is gaseous distention of the colon. 10/18/21 2017  XR CHEST PORT Final result    Impression:  1. Enteric tube as above. 2. Persistent multifocal airspace disease with a cavitary lesion in the left   lateral lung. Narrative:  AP portable chest, 2009 hours. Comparison: 10/17/2021. Findings: There is an enteric tube in place which coils overlying the gastric   fundus with the tip projected back towards the gastroesophageal junction. Cardiac monitoring leads overlie the chest. The heart is normal in size. There   is persistent dense consolidation in the right perihilar region. There is a   cavitary lesion laterally in the left midlung zone. No pleural effusion or   pneumothorax is identified. The osseous structures are unremarkable. There is   gaseous distention of the bowel. 10/17/21 1512  XR CHEST PORT Final result    Impression:  1. Large right perihilar opacity, mass versus airspace disease. Further   characterization with CT is recommended. 2. Nodular opacity in the left midlung zone. Further characterization with CT is   recommended. Narrative:  AP portable chest, 1500 hours. Comparison: None. Findings: Multiple cardiac monitoring leads overlie the chest. The heart is   normal in size. There is an 8.4 cm right perihilar opacity. There is a 3.7 cm   opacity in the left midlung zone. There is no pulmonary vascular congestion. No   pleural effusion or pneumothorax is identified. The osseous structures are   unremarkable.                Results from [place] encounter on 10/17/21    XR CHEST PORT    Narrative  PROCEDURE: XR CHEST PORT    HISTORY:Intubated    COMPARISON:Chest x-ray approximately 2 hours ago      TECHNIQUE: AP portable chest    LIMITATIONS: None    TUBES/LINES: Endotracheal tube is now present with the tip 3.5 cm above the  shahid. LUNG PARENCHYMA/PLEURA: Left hemidiaphragm remains elevated. Perihilar densities  persist, unchanged  TRACHEA/BRONCHI: Normal  PULMONARY VESSELS: Normal  HEART: Normal  MEDIASTINUM: Normal  BONE/SOFT TISSUES: No acute process    OTHER: None    Impression  Endotracheal tube in satisfactory position. Otherwise stable exam.      XR CHEST PORT    Narrative  PROCEDURE: XR CHEST PORT    HISTORY:Desaturating, possible aspiration    COMPARISON:Chest x-ray 25 October 2021      TECHNIQUE: AP portable chest    LIMITATIONS: Left arm over the left lower chest    TUBES/LINES: None    LUNG PARENCHYMA/PLEURA: Left hemidiaphragm is elevated. Increased perihilar  density bilaterally. TRACHEA/BRONCHI: Normal  PULMONARY VESSELS: Normal  HEART: Heart size stable  MEDIASTINUM: No discrete mass  BONE/SOFT TISSUES: No acute process    OTHER: None    Impression  Elevation of left hemidiaphragm with atelectasis in left base. Increased density in the perihilar regions which may represent atelectasis  and/or developing infiltrate. Aspiration could give this appearance. .      XR CHEST PORT    Narrative  Trouble breathing. Comparison chest x-ray 10/21/2021. Findings: Single rotated frontal view of the chest. Normal cardiomediastinal  silhouette. No vascular congestion or pulmonary edema. The lungs are  hypoinflated. Unchanged cavitary lesion left lower lobe. No pleural effusion,  pneumothorax. No free air under the hemidiaphragms. Bones grossly intact. Impression  Pulmonary hypoinflation. Unchanged cavitary lesion left lower lobe.     Results from [place] encounter on 10/17/21    CTA CHEST W OR W WO CONT    Narrative  Chest CTA    TECHNIQUE: Multiple continuous axial images were obtained from the thoracic  inlet to the upper abdomen after the uneventful administration of intravenous  contrast. Reformatted images as well as maximum intensity projection images were  obtained in the sagittal and coronal planes. Comment on dose reduction: All CT scans at this facility are performed using  dose reduction optimization technique as appropriate to perform the exam  including the following; automated exposure control, adjustments of the mA  and/or kV according to patient size, or use of iterative reconstructed  technique. Comparison examination: Chest radiograph dated October 17, 2021    Findings:  LYMPHADENOPATHY: Mediastinal lymph nodes top normal in size, likely reactive to  the process in the lungs described below. CARDIOVASCULAR: Negative for pulmonary embolus. Heart normal in size. Thoracic  aorta normal in caliber, negative for dissection. Great vessels patent. LUNGS AND PLEURA: Tree-in-bud centrilobular micronodules diffusely throughout  the lungs with subpleural consolidation present right lower lobe and left  posterior basilar segment. Cavitary lesion with air-fluid level present left  lower lobe measuring approximately 21 mm, cavitary lesion with air-fluid level  medial segment middle lobe measuring 21 mm. INCLUDED ABDOMEN: The visualized upper abdomen images normally. CHEST WALL: No suspicious lytic or blastic lesion is identified. Impression  Negative for pulmonary embolus. Findings the lungs consistent with multilobar pneumonia with concomitant  bronchiolitis. The cavitary foci may represent pneumatoceles or potentially  septic emboli, echocardiogram recommended when clinically able. Discussion patient has gurgling respirations has pharyngeal dysphagia is chronically aspirating. Initial x-ray was suspicious for gastric distention as well very likely has gastroparesis with reflux and aspiration on that.   Chest x-ray and CT show bibasilar infiltrates right greater than left consistent with aspiration and there is a cavitary area in the left lung which suggest this is a chronic process. She is on Zosyn I agree with that. Agree n.p.o. status with feeding tube. She likely needs a PEG tube  10/19 now in the ICU due to respiratory distress has a lot of upper airway noise with inability to control oral secretions. Nurses are requesting Robinul however abdominal x-ray is suggestive of ileus with fecal impaction. Robinul could possibly make this worse. Despite this we will place on small dose Robinul. We will give enema start Colace per tube. Nasal MRSA smear and blood culture are negative we will discontinue vancomycin. Potassium to be repleted IV. Sodium remains highly elevated but is minimally improved. 10/20 respirations nonlabored today upper airway noise absent. Will decrease dose Robinul as I am worried about ileus. Abdominal x-ray still suggest fecal impaction. She received an enema yesterday we will repeat today and add sorbitol per G-tube.   Would continue NG feedings  10/21 chest x-ray shows bilateral infiltrate with cavitary lesion NG tube is in the gastric fundal region  10/22 condition stable this morning saturation 100% on room air  10/26 seconds of movements noted patient intubated on ventilator 100% FiO2 will change the vent settings abdomen X ray chest x-ray did not shows much different from before still possibility of aspiration  Time of care 30 minutes    Fox Wakefield MD

## 2021-10-27 NOTE — PROGRESS NOTES
Problem: Falls - Risk of  Goal: *Absence of Falls  Description: Document Francisco Massey Fall Risk and appropriate interventions in the flowsheet. Outcome: Progressing Towards Goal  Note: Fall Risk Interventions:       Mentation Interventions: Adequate sleep, hydration, pain control, Bed/chair exit alarm, Door open when patient unattended, Update white board    Medication Interventions: Bed/chair exit alarm    Elimination Interventions: Bed/chair exit alarm, Toileting schedule/hourly rounds              Problem: Patient Education: Go to Patient Education Activity  Goal: Patient/Family Education  Outcome: Progressing Towards Goal     Problem: Pressure Injury - Risk of  Goal: *Prevention of pressure injury  Description: Document Catarino Scale and appropriate interventions in the flowsheet. Outcome: Progressing Towards Goal  Note: Pressure Injury Interventions:  Sensory Interventions: Assess need for specialty bed, Assess changes in LOC, Turn and reposition approx. every two hours (pillows and wedges if needed)    Moisture Interventions: Absorbent underpads, Maintain skin hydration (lotion/cream), Minimize layers    Activity Interventions: Assess need for specialty bed, Pressure redistribution bed/mattress(bed type)    Mobility Interventions: Assess need for specialty bed, Pressure redistribution bed/mattress (bed type), Turn and reposition approx.  every two hours(pillow and wedges)    Nutrition Interventions: Document food/fluid/supplement intake    Friction and Shear Interventions: Apply protective barrier, creams and emollients, Foam dressings/transparent film/skin sealants, HOB 30 degrees or less, Transfer aides:transfer board/Donna lift/ceiling lift, Transferring/repositioning devices                Problem: Patient Education: Go to Patient Education Activity  Goal: Patient/Family Education  Outcome: Progressing Towards Goal     Problem: Ventilator Management  Goal: *Adequate oxygenation and ventilation  Outcome: Progressing Towards Goal  Goal: *Patient maintains clear airway/free of aspiration  Outcome: Progressing Towards Goal  Goal: *Absence of infection signs and symptoms  Outcome: Progressing Towards Goal  Goal: *Normal spontaneous ventilation  Outcome: Progressing Towards Goal     Problem: Patient Education: Go to Patient Education Activity  Goal: Patient/Family Education  Outcome: Progressing Towards Goal

## 2021-10-27 NOTE — PROGRESS NOTES
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 Patient was noted to be nonverbal, chronically ill looking, contracted and in respiratory distress. Initially oxygen saturations were in the 80s. She was placed on a nonrebreather. CTA of the chest showed multi focal airspace disease and a small cavitary lesion in the left lower lobe. Patient was admitted and started on Zosyn. Family requested DNR CODE STATUS although did agree to intubation if needed Labs showed a sodium of 164 Patient was initially weaned to room air but on the evening of 10/19 she desaturated and was transferred to the ICU. She was quickly weaned back to room air. She was then transferred back to the floor. NG tube placed and she was started on tube feeds. GI was consulted for consideration of PEG tube On 10/24 patient redeveloped fever and became hypoxic again, was restarted on oxygen. Patient was transferred back again to the ICU. Patient required intubation early this morning emergently due to worsening respiratory status. PO2 was only 41 on nonrebreather mask Patient became hypotensive and was started on norepinephrine 
 
------ Patient is seen today for follow-up. Patient continues on mechanical: Ventilation with an FiO2 of 50%. Chest x-ray this morning shows new development of subcutaneous air and pneumomediastinum. Her ET tube is 6.5 cm above the shahid. Problem List: 
Problem List as of 10/27/2021 Never Reviewed Codes Class Noted - Resolved Severe protein-calorie malnutrition ([facility] ICD-10-CM: M60 ICD-9-CM: 461  10/18/2021 - Present Acute respiratory failure ([facility] ICD-10-CM: J96.00 
ICD-9-CM: 518.81  10/17/2021 - Present  Failure to thrive in adult ICD-10-CM: R62.7 ICD-9-CM: 783.7  10/17/2021 - Present Multifocal pneumonia ICD-10-CM: J18.9 ICD-9-CM: 551  10/17/2021 - Present Medications reviewed Current Facility-Administered Medications Medication Dose Route Frequency  NOREPINephrine (LEVOPHED) 8 mg in 5% dextrose 250mL (32 mcg/mL) infusion  0.5-16 mcg/min IntraVENous TITRATE  propofol (DIPRIVAN) 10 mg/mL infusion  0-50 mcg/kg/min IntraVENous TITRATE  zinc oxide-white petrolatum 17-57 % topical paste   Topical TID  albuterol-ipratropium (DUO-NEB) 2.5 MG-0.5 MG/3 ML  3 mL Nebulization Q6HWA RT  
 albuterol-ipratropium (DUO-NEB) 2.5 MG-0.5 MG/3 ML  3 mL Nebulization Q6H PRN  
 sorbitoL 70 % solution 30 mL  30 mL Per G Tube DAILY  glycopyrrolate (ROBINUL) tablet 0.5 mg  0.5 mg Per G Tube BID  docusate (COLACE) 50 mg/5 mL oral liquid 100 mg  100 mg Per G Tube BID  potassium chloride (KLOR-CON) packet for solution 20 mEq  20 mEq Per NG tube TID WITH MEALS  
 [Held by provider] mirtazapine (REMERON) tablet 15 mg  15 mg Oral QHS  sodium chloride (NS) flush 5-40 mL  5-40 mL IntraVENous Q8H  
 sodium chloride (NS) flush 5-40 mL  5-40 mL IntraVENous PRN  
 acetaminophen (TYLENOL) tablet 650 mg  650 mg Oral Q6H PRN Or  
 acetaminophen (TYLENOL) suppository 650 mg  650 mg Rectal Q6H PRN  polyethylene glycol (MIRALAX) packet 17 g  17 g Oral DAILY PRN  
 ondansetron (ZOFRAN ODT) tablet 4 mg  4 mg Oral Q8H PRN Or  
 ondansetron (ZOFRAN) injection 4 mg  4 mg IntraVENous Q6H PRN  
 enoxaparin (LOVENOX) injection 40 mg  40 mg SubCUTAneous DAILY  piperacillin-tazobactam (ZOSYN) 3.375 g in 0.9% sodium chloride (MBP/ADV) 100 mL MBP  3.375 g IntraVENous Q8H Review of Systems:  
Review of systems not obtained due to patient factors. Objective:  
Physical Exam:  
 
Visit Vitals BP 98/73 (BP 1 Location: Left upper arm, BP Patient Position: At rest) Pulse 83 Temp 98.8 °F (37.1 °C) Resp 12 Ht 5' 6\" (1.676 m) Wt 34 kg (74 lb 15.3 oz) LMP  (LMP Unknown) SpO2 100% BMI 12.10 kg/m² O2 Flow Rate (L/min): 60 l/min O2 Device: Ventilator Temp (24hrs), Av.8 °F (37.1 °C), Min:98.2 °F (36.8 °C), Max:100.6 °F (38.1 °C) No intake/output data recorded. 10/25 1901 - 10/27 0700 In: 580 Out: - General:   Unresponsive, cachectic and emaciated. Patient intubated Lungs:    Rhonchi bilateral  
Chest wall:  No tenderness or deformity. Heart:  Regular rate and rhythm, S1, S2 normal, no murmur, click, rub or gallop. Abdomen:   Soft, non-tender. Bowel sounds normal. No masses,  No organomegaly. Extremities:  Contracted Pulses: 2+ and symmetric all extremities. Skin: Skin color, texture, turgor normal. No rashes or lesions Neurologic: CNII-XII intact. Contractures of upper and lower extremities Data Review:  
   
Recent Days: 
Recent Labs 10/27/21 
0447 10/25/21 [address] [place] [address] WBC 20.9* 6.4 HGB 10.5* 13.0 HCT 32.8* 39.8  277 Recent Labs 10/27/21 
0447 10/25/21 
1330   142 142  
K 4.2  4.2 4.8  
*  109* 108 CO2 28  28 31 *  139* 100 BUN 9  9 3* CREA 0.26*  0.26* 0.25* CA 7.9*  8.0* 8.8 MG  --  2.0 PHOS 2.2* 2.0* ALB 1.6*  1.7*  --   
TBILI 0.5  --   
ALT 40  --   
 
Recent Labs 10/27/21 
4631 10/26/21 
0720 10/26/21 
0696 PH 7.49* 7.55* 7.54* PCO2 39 35 34* PO2 217* 300* 43* HCO3 30* 30* 30* FIO2 50.0 100.0 100  
 
 
24 Hour Results: 
Recent Results (from the past 24 hour(s)) GLUCOSE, POC Collection Time: 10/26/21  1:13 PM  
Result Value Ref Range Glucose (POC) 123 (H) 65 - 117 mg/dL Performed by Claudeen Jack GLUCOSE, POC Collection Time: 10/26/21  7:28 PM  
Result Value Ref Range Glucose (POC) 126 (H) 65 - 117 mg/dL Performed by Miguel Colindres, ARTERIAL Collection Time: 10/27/21  3:33 AM  
Result Value Ref Range pH 7.49 (H) 7.35 - 7.45    
 PCO2 39 35 - 45 mmHg PO2 217 (H) 75 - 100 mmHg O2  >95 % BICARBONATE 30 (H) 22 - 26 mmol/L  
 BASE EXCESS 5.6 (H) 0 - 2 mmol/L  
 O2 METHOD VENT    
 FIO2 50.0 % MODE Assist Control/Volume Control Tidal volume 350 SET RATE 12    
 EPAP/CPAP/PEEP 10.0 SITE Right Femoral    
 HUGO'S TEST PASS RENAL FUNCTION PANEL Collection Time: 10/27/21  4:47 AM  
Result Value Ref Range Sodium 143 136 - 145 mmol/L Potassium 4.2 3.5 - 5.1 mmol/L Chloride 109 (H) 97 - 108 mmol/L  
 CO2 28 21 - 32 mmol/L Anion gap 6 5 - 15 mmol/L Glucose 140 (H) 65 - 100 mg/dL BUN 9 6 - 20 mg/dL Creatinine 0.26 (L) 0.55 - 1.02 mg/dL BUN/Creatinine ratio 35 (H) 12 - 20 GFR est AA >60 >60 ml/min/1.73m2 GFR est non-AA >60 >60 ml/min/1.73m2 Calcium 7.9 (L) 8.5 - 10.1 mg/dL Phosphorus 2.2 (L) 2.6 - 4.7 mg/dL Albumin 1.6 (L) 3.5 - 5.0 g/dL CBC WITH AUTOMATED DIFF Collection Time: 10/27/21  4:47 AM  
Result Value Ref Range WBC 20.9 (H) 3.6 - 11.0 K/uL  
 RBC 3.74 (L) 3.80 - 5.20 M/uL  
 HGB 10.5 (L) 11.5 - 16.0 g/dL HCT 32.8 (L) 35.0 - 47.0 % MCV 87.7 80.0 - 99.0 FL  
 MCH 28.1 26.0 - 34.0 PG  
 MCHC 32.0 30.0 - 36.5 g/dL  
 RDW 13.9 11.5 - 14.5 % PLATELET 839 085 - 755 K/uL MPV 10.0 8.9 - 12.9 FL  
 NRBC 0.1 (H) 0.0  WBC ABSOLUTE NRBC 0.02 (H) 0.00 - 0.01 K/uL NEUTROPHILS 84 (H) 32 - 75 % LYMPHOCYTES 14 12 - 49 % MONOCYTES 1 (L) 5 - 13 % EOSINOPHILS 0 0 - 7 % BASOPHILS 0 0 - 1 % IMMATURE GRANULOCYTES 1 (H) 0 - 0.5 % ABS. NEUTROPHILS 17.6 (H) 1.8 - 8.0 K/UL  
 ABS. LYMPHOCYTES 2.8 0.8 - 3.5 K/UL  
 ABS. MONOCYTES 0.2 0.0 - 1.0 K/UL  
 ABS. EOSINOPHILS 0.1 0.0 - 0.4 K/UL  
 ABS. BASOPHILS 0.0 0.0 - 0.1 K/UL  
 ABS. IMM. GRANS. 0.2 (H) 0.00 - 0.04 K/UL  
 DF AUTOMATED METABOLIC PANEL, COMPREHENSIVE Collection Time: 10/27/21  4:47 AM  
Result Value Ref Range Sodium 142 136 - 145 mmol/L Potassium 4.2 3.5 - 5.1 mmol/L  Chloride 109 (H) 97 - 108 mmol/L  
 CO2 28 21 - 32 mmol/L Anion gap 5 5 - 15 mmol/L Glucose 139 (H) 65 - 100 mg/dL BUN 9 6 - 20 mg/dL Creatinine 0.26 (L) 0.55 - 1.02 mg/dL BUN/Creatinine ratio 35 (H) 12 - 20 GFR est AA >60 >60 ml/min/1.73m2 GFR est non-AA >60 >60 ml/min/1.73m2 Calcium 8.0 (L) 8.5 - 10.1 mg/dL Bilirubin, total 0.5 0.2 - 1.0 mg/dL AST (SGOT) 25 15 - 37 U/L  
 ALT (SGPT) 40 12 - 78 U/L Alk. phosphatase 93 45 - 117 U/L Protein, total 5.3 (L) 6.4 - 8.2 g/dL Albumin 1.7 (L) 3.5 - 5.0 g/dL Globulin 3.6 2.0 - 4.0 g/dL A-G Ratio 0.5 (L) 1.1 - 2.2 XR CHEST PORT Final Result FINDINGS: IMPRESSION: Single frontal view of the chest.  
  
ET tube distal tip 6.7 cm above shahid. Normal heart size. Similar pneumomediastinum. The lungs are well inflated. Right perihilar bronchial thickening and opacity  
unchanged. Left hand obscures the left lung. No sizable pleural effusion, lobar  
consolidation, or evident pneumothorax. No free air under the diaphragm. PEG tube balloon projects over gastric air  
bubble. Previous dense material in the expected location of esophagus has  
resolved. Unchanged right greater than left subcutaneous emphysema. XR CHEST PORT Final Result FINDINGS: IMPRESSION: Single frontal view of the chest.  
  
ET tube distal tip 6.5 cm above shahid. Normal heart size. Pneumomediastinum and subcutaneous emphysema has developed  
since the prior study. No jacey evidence of pneumothorax. Called report to  
patient's nurse [facility] at 5:15 AM 10/27/2021. Left hand obscures the left hemithorax. Right parahilar bronchial thickening and  
opacity unchanged. No lobar consolidation, sizable pleural effusion. Dense  
material projects over the medial left lower hemithorax possibly tube feed  
reflux in the esophagus. PEG tube balloon projects over the stomach. XR ABD PORT  1 V Final Result IR INSERT NON TUNL CVC OVER [address] Final Result Successful placement of a triple-lumen central venous catheter. The catheter is  
ready for use. IR [facility] Final Result Successful placement of a triple-lumen central venous catheter. The catheter is  
ready for use. XR ABD (KUB) Final Result XR CHEST PORT Final Result Endotracheal tube in satisfactory position. Otherwise stable exam.   
  
  
  
  
XR CHEST PORT Final Result Elevation of left hemidiaphragm with atelectasis in left base. Increased density in the perihilar regions which may represent atelectasis  
and/or developing infiltrate. Aspiration could give this appearance. Maegan Dye XR CHEST PORT Final Result Pulmonary hypoinflation. Unchanged cavitary lesion left lower lobe. XR CHEST PORT Final Result XR CHEST PORT Final Result XR CHEST PORT Final Result Feeding tube as above. No significant interval change. XR CHEST PORT Final Result FINDINGS: IMPRESSION: Single frontal view of the abdomen. Enteric tube passes below the left hemidiaphragm, coiled in the gastric fundus  
region. Normal heart size. Increased hypoinflation and bilateral pulmonary airspace pneumonia and/or  
atelectasis. Cavitary lesion left lower lobe as previous. No large pleural  
effusion or pneumothorax. No free air under the diaphragm. XR CHEST PORT Final Result XR ABD (KUB) Final Result XR ABD PORT  1 V Final Result XR CHEST PORT Final Result FINDINGS: IMPRESSION: Single frontal view of the chest. Patient's left hand  
obscures the left lung/hemithorax. Enteric tube distal tip below left hemidiaphragm. Normal heart size. Similar right perihilar and upper lobe airspace disease. No sizable pleural  
effusion or pneumothorax. XR CHEST PORT Final Result Slightly retracted enteric tube, otherwise unchanged. XR CHEST PORT Final Result 1. Enteric tube as above. 2. Persistent multifocal airspace disease with a cavitary lesion in the left  
lateral lung. CTA CHEST W OR W WO CONT Final Result Negative for pulmonary embolus. Findings the lungs consistent with multilobar pneumonia with concomitant  
bronchiolitis. The cavitary foci may represent pneumatoceles or potentially  
septic emboli, echocardiogram recommended when clinically able. XR CHEST PORT Final Result 1. Large right perihilar opacity, mass versus airspace disease. Further  
characterization with CT is recommended. 2. Nodular opacity in the left midlung zone. Further characterization with CT is  
recommended. XR NECK SOFT TISSUE Final Result No significant soft tissue swelling or radiodense foreign body. Assessment: 
Multifocal bilateral pneumonia with cavitary lesion left lower lobe 
 -On Zosyn day #10 Acute respiratory failure with hypoxia, now requiring mechanical ventilation. Patient intubated 10/26 Pneumomediastinum, new Hypernatremia due to dehydration, improved Severe sepsis with septic shock with tachycardia and elevated lactate. Present on admission 
  -Now on pressors Hypokalemia. Repleted Possible complicated UTI. Urine culture was negative Hereditary spinocerebellar ataxia with chronic debilitation Adult failure to thrive Severe protein calorie malnutrition. Status post PEG tube this admission Possible adynamic ileus Plan: 
Continue mechanical ventilation Continue IV Zosyn Continue tube feeds and supportive care Overall prognosis very poor Disposition: Continued inpatient care Total time spent with patient: 30 minutes.  
 
Michael Schroeder MD

## 2021-10-27 NOTE — PROGRESS NOTES
Comprehensive Nutrition Assessment    Type and Reason for Visit: Reassess (Interim)    Nutrition Recommendations/Plan:   Adjust TF via PEG to Osmolite 1.2 at goal of 35ml/hr  Flush with 110ml q6h or per MD      Provides 1008kcal (90%), 47g pro (98%), and 1130ml (101%)      Propofol at 30 mcg/kg/min providing 162kcal/day (meeting 104% of kcal with TF)    Document TF formula, TF rate, flushes, and GRVS in I/O's    Nutrition Assessment:  Admitted for sudden onset of gurgling and respiratory distress witnessed by family. Patient is nonverbal, chronically ill looking; MD rec'd palliative but family would like to continue care. Worsening resp status, transferred in and out of ICU multiple times. Now intubated and sedated on 10/25. Requiring x1 pressor at this time. RD to update TF recs. Earlier in admit, NG placed for nutrition (10/18); PEG placed on 10/23, started on continuous feeds with Jevity for concern of refeeding. Tolerated well. Labs: Glu POC , H/H 10.5/32.8, Cr 0.26, Ca 7.9, Phos 2.2 Meds: colace, enoxaparin, norepinephrine, zosyn, KCl, propofol (30 mcg/kg/min), IVF. Malnutrition Assessment:  Malnutrition Status:  Severe malnutrition    Context:  Chronic illness     Findings of the 6 clinical characteristics of malnutrition:   Energy Intake:  No significant decrease in energy intake  Weight Loss:  Unable to assess     Body Fat Loss:  7 - Severe body fat loss, Fat overlying ribs, Orbital, Triceps   Muscle Mass Loss:  7 - Severe muscle mass loss, Calf (gastrocnemius), Clavicles (pectoralis &deltoids), Hand (interosseous), Thigh (quadriceps), Temples (temporalis)  Fluid Accumulation:  No significant fluid accumulation,      Estimated Daily Nutrient Needs:  Energy (kcal): 1120kcal; Weight Used for Energy Requirements: Current  Protein (g): 48g (1.4g/kg);  Weight Used for Protein Requirements: Current  Fluid (ml/day): 1120ml; Method Used for Fluid Requirements: 1 ml/kcal    Nutrition Related Findings:  NFPE showing severe wasting throughout body. Extremely cachetic. No known N/V/D/C. Last BM 10/25, loose. BM every 2 days appears normal for pt. No edema. Wounds:    None       Current Nutrition Therapies:  DIET NPO  ADULT TUBE FEEDING Nasogastric; Standard with Fiber; Delivery Method: Continuous; Continuous Initial Rate (mL/hr): 10; Continuous Advance Tube Feeding: Yes; Advancement Volume (mL/hr): 10; Advancement Frequency: Q 12 hours; Continuous Goal Rate (m. .. Anthropometric Measures:  · Height:  5' 6\" (167.6 cm)  · Current Body Wt:  34 kg (74 lb 15.3 oz)   · Usual Body Wt:  31.3 kg (69 lb)     · Ideal Body Wt:  130 lbs:  57.7 %   · BMI Category:  Underweight (BMI less than 22) age over 72       Nutrition Diagnosis:   · Inadequate oral intake related to cognitive or neurological impairment as evidenced by BMI, severe muscle loss, severe loss of subcutaneous fat    Nutrition Interventions:   Food and/or Nutrient Delivery: Continue tube feeding  Nutrition Education and Counseling: Education not indicated  Coordination of Nutrition Care: Continue to monitor while inpatient    Goals:  Pt to meet >75% of EEN via NGT within 3 days. Wt gain +0.5kg/week. Lytes WNL.        Nutrition Monitoring and Evaluation:   Behavioral-Environmental Outcomes: None identified  Food/Nutrient Intake Outcomes: Enteral nutrition intake/tolerance  Physical Signs/Symptoms Outcomes: Biochemical data, Weight, Nutrition focused physical findings    Discharge Planning:    Enteral nutrition     Electronically signed by Jaime Devries RD on 10/27/2021 at 9:04 AM    Contact: [phone]

## 2021-10-27 NOTE — PROGRESS NOTES
CM reviewed clinical chart. Patient is still being actively treated by internal medicine, nutrition, and pulmonology. CM team will continue to follow for any needs at discharge.

## 2021-10-27 NOTE — PROGRESS NOTES
Pulmonary, Critical Care Note    Name: Judge Vincent MRN: [id number]   : 1985 Hospital: [address] [place] [address]   Date: 10/27/2021  Admission date: 10/17/2021 Hospital Day: 11       Subjective/Interval History:   Patient seen on the medical floor. Has best I can understand she is bedbound nonverbal but does eat orally. In any event she has had gurgling respirations were brought to the emergency room has bilateral pneumonia right base greater than left base with a cavitary area in the left lower lung pleural base. She is normally followed at [facility]  10/19 now in the ICU on room air. Apparently had worsening respiratory status last evening and had desaturation was transferred to the ICU has had frequent oral suctioning and is now back on room air  10/20 NG tube in place currently on room air saturation 98% looks at voice otherwise unresponsive  10/26 events noted patient was transferred to ICU now intubated on the ventilator she was put on 100% nonrebreather mask and then to Ventimask but she did not tolerate subsequently got intubated she was hemodynamically unstable on pressors now    Hospital Problems  Never Reviewed        Codes Class Noted POA    Severe protein-calorie malnutrition ([facility] [address] ICD-10-CM: E43  ICD-9-CM: 262  10/18/2021 Unknown        Ac[facility] ICD-10-CM: J96.00  ICD-9-CM: 518.81  10/17/2021 Unknown        Failure to thrive in adult ICD-10-CM: R62.7  ICD-9-CM: 783.7  10/17/2021 Unknown        Multifocal pneumonia ICD-10-CM: J18.9  ICD-9-CM: 486  10/17/2021 Unknown              IMPRESSION:   1. Acute hypoxic respiratory failure   2. Aspiration pneumonia   3. Hypothermia resolved  4. Pneumomediastinum  5. Possible left apical pneumothorax  6. Hypovolemic shock  7. Pharyngeal dysphagia  8. Gastroesophageal reflux disease with gastroparesis  9. Fecal impaction with ileus  10. Ileus has bowel sounds  11. Severe malnutrition  12.  We will start IV fluids  Body mass index is 12.1 kg/m². RECOMMENDATIONS/PLAN:   1. Intubated on ventilator assist control mode 100% FiO2 350 tidal volume rate of 16 PEEP of 12 we will decrease FiO2 and decrease the rate  2. Will retract ET tube very close to the shahid will repeat chest x-ray which shows hilar infiltrate and distended gastric antrum and loops of bowel pushing the left hemidiaphragm up endotracheal tube has been adjusted  3. We will repeat chest x-ray possibility of pneumo thorax pneumomediastinum will get CT chest  4. On vasopressors hemodynamically unstable Levofed 10 rajendra  5. Patient was having more shortness of breath hypoxic episode received Solu-Medrol  6. Leave on room air NG tube in place for EGD  7. Continue with Zosyn for ongoing aspiration             [x] High complexity decision making was performed  [x] See my orders for details      Subjective/Initial History:     I was asked by Abelino Jasso MD to see Silvina Encarnacion  a 39 y.o.    female in consultation for a chief complaint of acute hypoxic respiratory failure aspiration pneumonia with cavitation        No Known Allergies     MAR reviewed and pertinent medications noted or modified as needed     Current Facility-Administered Medications   Medication    NOREPINephrine (LEVOPHED) 8 mg in 5% dextrose 250mL (32 mcg/mL) infusion    propofol (DIPRIVAN) 10 mg/mL infusion    zinc oxide-white petrolatum 17-57 % topical paste    albuterol-ipratropium (DUO-NEB) 2.5 MG-0.5 MG/3 ML    albuterol-ipratropium (DUO-NEB) 2.5 MG-0.5 MG/3 ML    sorbitoL 70 % solution 30 mL    glycopyrrolate (ROBINUL) tablet 0.5 mg    docusate (COLACE) 50 mg/5 mL oral liquid 100 mg    potassium chloride (KLOR-CON) packet for solution 20 mEq    [Held by provider] mirtazapine (REMERON) tablet 15 mg    sodium chloride (NS) flush 5-40 mL    sodium chloride (NS) flush 5-40 mL    acetaminophen (TYLENOL) tablet 650 mg    Or    acetaminophen (TYLENOL) suppository 650 mg    polyethylene glycol (MIRALAX) packet 17 g    ondansetron (ZOFRAN ODT) tablet 4 mg    Or    ondansetron (ZOFRAN) injection 4 mg    enoxaparin (LOVENOX) injection 40 mg    piperacillin-tazobactam (ZOSYN) 3.375 g in 0.9% sodium chloride (MBP/ADV) 100 mL MBP      Patient PCP: Unknown, Provider, MD  PMH:  has a past medical history of Cerebellar ataxia ([address] PSH:   has a past surgical history that includes pr breast surgery procedure unlisted and ir insert non tunl cvc over 5 yrs (10/26/2021). FHX: family history is not on file. SHX:  reports that she has never smoked. She has never used smokeless tobacco. She reports that she does not drink alcohol and does not use drugs. Systemic review unobtainable as the patient is nonverbal      Objective:     Vital Signs: Telemetry:    normal sinus rhythm Intake/Output:   Visit Vitals  /69   Pulse 76   Temp 99.1 °F (37.3 °C)   Resp 12   Ht 5' 6\" (1.676 m)   Wt 34 kg (74 lb 15.3 oz)   LMP  (LMP Unknown)   SpO2 100%   BMI 12.10 kg/m²       Temp (24hrs), Av.7 °F (37.1 °C), Min:98.2 °F (36.8 °C), Max:99.5 °F (37.5 °C)        O2 Device: Ventilator O2 Flow Rate (L/min): 60 l/min       Wt Readings from Last 4 Encounters:   10/21/21 34 kg (74 lb 15.3 oz)          Intake/Output Summary (Last 24 hours) at 10/27/2021 1130  Last data filed at 10/27/2021 0315  Gross per 24 hour   Intake 290 ml   Output    Net 290 ml       Last shift:      No intake/output data recorded.   Last 3 shifts: 10/25 1901 - 10/27 0700  In: 80   Out: -        Physical Exam:   General:  female; intubated on ventilator  HEENT: NCAT, poor dentition, lips and mucosa dry  Eyes: anicteric; conjunctiva clear random eye movement present  Neck: no nodes, no JVD, no accessory MM use  Chest: no deformity,   Cardiac: Regular rate and rhythm  Lungs: Diminished breath sounds at left side coarse breath sound on the right side  Abd: Thin emaciated bowel sounds present  Ext: no edema; no joint swelling; No clubbing  : clear urine  Neuro: Eyes are open does not look at voice has some random eye movement contracted extremities  Psych-nonverbal unable to assess  Skin: warm, dry, no cyanosis;   Pulses: Brachial radial femoral pulses intact  Capillary: Normal capillary refill  Neuro contracted upper extremities    Labs:    Recent Labs     10/27/21  0447 10/25/21  1515   WBC 20.9* 6.4   HGB 10.5* 13.0    277     Recent Labs     10/27/21  0447 10/25/21  1330     142 142   K 4.2  4.2 4.8   *  109* 108   CO2 28  28 31   *  139* 100   BUN 9  9 3*   CREA 0.26*  0.26* 0.25*   CA 7.9*  8.0* 8.8   MG  --  2.0   PHOS 2.2* 2.0*   ALB 1.6*  1.7*  --    ALT 40  --    10/20 on 2 L nasal oxygen PO2 115 PCO2 40 pH 7.42  10/20 room air oxygen saturation 98%     Nasal MRSA smear negative  Blood 1 of 4 bottles with gram-positive cocci  Urine culture no growth  COVID-19 antigen negative  Procalcitonin 2.21  Lab Results   Component Value Date/Time    Culture result: No significant growth, <10,000 CFU/mL 10/18/2021 11:15 AM    Culture result: (A) 10/17/2021 05:30 PM     Gram Positive Cocci CALLED TO AND READ BACK BY HOME SPICER R.N. 1030 10/19/2021 BY DPW    Culture result:  10/17/2021 05:30 PM     Staphylococcus species, coagulase negative growing in 1 of 4 bottles drawn NO SITE     Imaging:    CXR Results  (Last 48 hours)               10/19/21 0333  XR CHEST PORT Final result    Impression:  FINDINGS: IMPRESSION: Single frontal view of the chest. Patient's left hand   obscures the left lung/hemithorax. Enteric tube distal tip below left hemidiaphragm. Normal heart size. Similar right perihilar and upper lobe airspace disease. No sizable pleural   effusion or pneumothorax. To me I agree there is right upper lobe infiltrate there is also left basilar infiltrate with obscuration of the hemidiaphragm       Narrative:  Aspiration pneumonia.        Comparison chest x-ray 10/18/2021.           10/18/21 2115  XR CHEST PORT Final result    Impression:  Slightly retracted enteric tube, otherwise unchanged. Narrative:  AP portable chest, 2108 hours. Comparison: Earlier the same day at 2009 hours. Findings: The enteric tube has been retracted slightly. The tip is in the region   of the gastric fundus. The sidehole remains below the diaphragm. Cardiac   monitoring leads overlie the chest.       The heart is normal in size. Multifocal airspace disease and a left-sided   cavitary lesion are again noted. There is no pleural effusion or pneumothorax. There is gaseous distention of the colon. 10/18/21 2017  XR CHEST PORT Final result    Impression:  1. Enteric tube as above. 2. Persistent multifocal airspace disease with a cavitary lesion in the left   lateral lung. Narrative:  AP portable chest, 2009 hours. Comparison: 10/17/2021. Findings: There is an enteric tube in place which coils overlying the gastric   fundus with the tip projected back towards the gastroesophageal junction. Cardiac monitoring leads overlie the chest. The heart is normal in size. There   is persistent dense consolidation in the right perihilar region. There is a   cavitary lesion laterally in the left midlung zone. No pleural effusion or   pneumothorax is identified. The osseous structures are unremarkable. There is   gaseous distention of the bowel. 10/17/21 1512  XR CHEST PORT Final result    Impression:  1. Large right perihilar opacity, mass versus airspace disease. Further   characterization with CT is recommended. 2. Nodular opacity in the left midlung zone. Further characterization with CT is   recommended. Narrative:  AP portable chest, 1500 hours. Comparison: None. Findings: Multiple cardiac monitoring leads overlie the chest. The heart is   normal in size. There is an 8.4 cm right perihilar opacity.  There is a 3.7 cm   opacity in the left midlung zone. There is no pulmonary vascular congestion. No   pleural effusion or pneumothorax is identified. The osseous structures are   unremarkable. Results from Hospital Encounter encounter on 10/17/21    XR CHEST PORT    Narrative  Examination: XR CHEST PORT    History: check ETT placement    Comparison: Chest radiograph 10/27/2021    FINDINGS:    Single frontal portable view of the chest. Endotracheal tube projects over the  mid intrathoracic trachea. Lung volumes are symmetric. Hazy opacities in the lungs, most pronounced in the  left mid to lower region appear similar to prior. Lucencies over the mediastinum  most likely represent pneumomediastinum and appears similar to prior. Very small  anterior pneumothorax on the left is difficult to completely exclude. No  significant pleural effusion is evident. The cardiac silhouette is normal in  size. Hilar prominence appearing similar to prior. Mediastinal contours and  osseous structures appear overall unchanged. There is subcutaneous gas in the  neck extending into the right chest as well. Impression  1. Similar appearance of lucencies paralleling the mediastinum that most likely  represent pneumomediastinum. Small anterior pneumothorax on the left is  difficult to completely exclude but overall appearance is similar to prior. Recommend continued attention on follow-up. 2. Pulmonary opacities appearing similar to prior. XR CHEST PORT    Narrative  Repeat after critical result. Comparison chest x-ray 10/27/2021 at 0216 hours. Impression  FINDINGS: IMPRESSION: Single frontal view of the chest.    ET tube distal tip 6.7 cm above shahid. Normal heart size. Similar pneumomediastinum. The lungs are well inflated. Right perihilar bronchial thickening and opacity  unchanged. Left hand obscures the left lung. No sizable pleural effusion, lobar  consolidation, or evident pneumothorax. No free air under the diaphragm.  PEG tube balloon projects over gastric air  bubble. Previous dense material in the expected location of esophagus has  resolved. Unchanged right greater than left subcutaneous emphysema. XR ABD PORT  1 V    Narrative  1 view at 1212    Left femoral line extends almost to the confluence    Gas pattern is unchanged. No free air    Results from Hospital Encounter encounter on 10/17/21    CTA CHEST W OR W WO CONT    Narrative  Chest CTA    TECHNIQUE: Multiple continuous axial images were obtained from the thoracic  inlet to the upper abdomen after the uneventful administration of intravenous  contrast. Reformatted images as well as maximum intensity projection images were  obtained in the sagittal and coronal planes. Comment on dose reduction: All CT scans at this facility are performed using  dose reduction optimization technique as appropriate to perform the exam  including the following; automated exposure control, adjustments of the mA  and/or kV according to patient size, or use of iterative reconstructed  technique. Comparison examination: Chest radiograph dated October 17, 2021    Findings:  LYMPHADENOPATHY: Mediastinal lymph nodes top normal in size, likely reactive to  the process in the lungs described below. CARDIOVASCULAR: Negative for pulmonary embolus. Heart normal in size. Thoracic  aorta normal in caliber, negative for dissection. Great vessels patent. LUNGS AND PLEURA: Tree-in-bud centrilobular micronodules diffusely throughout  the lungs with subpleural consolidation present right lower lobe and left  posterior basilar segment. Cavitary lesion with air-fluid level present left  lower lobe measuring approximately 21 mm, cavitary lesion with air-fluid level  medial segment middle lobe measuring 21 mm. INCLUDED ABDOMEN: The visualized upper abdomen images normally. CHEST WALL: No suspicious lytic or blastic lesion is identified.     Impression  Negative for pulmonary embolus. Findings the lungs consistent with multilobar pneumonia with concomitant  bronchiolitis. The cavitary foci may represent pneumatoceles or potentially  septic emboli, echocardiogram recommended when clinically able. Discussion patient has gurgling respirations has pharyngeal dysphagia is chronically aspirating. Initial x-ray was suspicious for gastric distention as well very likely has gastroparesis with reflux and aspiration on that. Chest x-ray and CT show bibasilar infiltrates right greater than left consistent with aspiration and there is a cavitary area in the left lung which suggest this is a chronic process. She is on Zosyn I agree with that. Agree n.p.o. status with feeding tube. She likely needs a PEG tube  10/19 now in the ICU due to respiratory distress has a lot of upper airway noise with inability to control oral secretions. Nurses are requesting Robinul however abdominal x-ray is suggestive of ileus with fecal impaction. Robinul could possibly make this worse. Despite this we will place on small dose Robinul. We will give enema start Colace per tube. Nasal MRSA smear and blood culture are negative we will discontinue vancomycin. Potassium to be repleted IV. Sodium remains highly elevated but is minimally improved. 10/20 respirations nonlabored today upper airway noise absent. Will decrease dose Robinul as I am worried about ileus. Abdominal x-ray still suggest fecal impaction. She received an enema yesterday we will repeat today and add sorbitol per G-tube.   Would continue NG feedings  10/21 chest x-ray shows bilateral infiltrate with cavitary lesion NG tube is in the gastric fundal region  10/22 condition stable this morning saturation 100% on room air  10/26 seconds of movements noted patient intubated on ventilator 100% FiO2 will change the vent settings abdomen X ray chest x-ray did not shows much different from before still possibility of aspiration  Time of care 30 minutes    Anya Pollard MD

## 2021-10-28 NOTE — ROUTINE PROCESS
Extubated patient. placed O2  2 lpm n.c SpO2 100% HR 77 RR 20. Left ventilator in room due to increased secretions and periodic increased work of breathing. Patient is unable adequately protect her airway. Will continue to monitor sats and work of breathing. Frequent suctioning required, Dr Damon aware of large amount of thin, clear secretions.

## 2021-10-28 NOTE — PROGRESS NOTES
Pulmonary, Critical Care Note    Name: Jonas Mcmillan MRN: [id number]   : 1985 [facility]   Date: 10/28/2021  Admission date: 10/17/2021 Hospital Day: 12       Subjective/Interval History:   Patient seen on the medical floor. Has best I can understand she is bedbound nonverbal but does eat orally. In any event she has had gurgling respirations were brought to the emergency room has bilateral pneumonia right base greater than left base with a cavitary area in the left lower lung pleural base. She is normally followed at [facility]  10/19 now in the ICU on room air. Apparently had worsening respiratory status last evening and had desaturation was transferred to the ICU has had frequent oral suctioning and is now back on room air  10/20 NG tube in place currently on room air saturation 98% looks at voice otherwise unresponsive  10/26 events noted patient was transferred to ICU now intubated on the ventilator she was put on 100% nonrebreather mask and then to Ventimask but she did not tolerate subsequently got intubated she was hemodynamically unstable on pressors now    Hospital Problems  Never Reviewed        Codes Class Noted POA    Severe protein-calorie malnutrition ([facility] [address] ICD-10-CM: E43  ICD-9-CM: 262  10/18/2021 Unknown        Acute respiratory failure ([facility] [address] ICD-10-CM: J96.00  ICD-9-CM: 518.81  10/17/2021 Unknown        Failure to thrive in adult ICD-10-CM: R62.7  ICD-9-CM: 783.7  10/17/2021 Unknown        Multifocal pneumonia ICD-10-CM: J18.9  ICD-9-CM: 486  10/17/2021 Unknown              IMPRESSION:   1. Acute hypoxic respiratory failure   2. Aspiration pneumonia   3. Hypothermia resolved  4. Pneumomediastinum  5. Possible left apical pneumothorax  6. Hypovolemic shock  7. Pharyngeal dysphagia  8. Gastroesophageal reflux disease with gastroparesis  9. Fecal impaction with ileus  10. Ileus has bowel sounds  11. Severe malnutrition  12.  We will start IV fluids  Body mass index is 12.1 kg/m². RECOMMENDATIONS/PLAN:   1. Intubated on ventilator assist control mode 28% FiO2 350 tidal volume rate of 16 PEEP of 12 we will decrease FiO2 and decrease the rate will do sedation vacation and probably extubation  2. Will retract ET tube very close to the shahid will repeat chest x-ray which shows hilar infiltrate and distended gastric antrum and loops of bowel pushing the left hemidiaphragm up endotracheal tube has been adjusted  3. We will repeat chest x-ray possibility of pneumo thorax pneumomediastinum CT chest pending  4. On vasopressors hemodynamically unstable Levofed 10 rajendra will start weaning  5. Patient was having more shortness of breath hypoxic episode received Solu-Medrol  6. Leave on room air NG tube in place for EGD  7. Continue with Zosyn for ongoing aspiration           [x] High complexity decision making was performed  [x] See my orders for details      Subjective/Initial History:     I was asked by Alana Mendez MD to see Miguelangel Guy  a 39 y.o.    female in consultation for a chief complaint of acute hypoxic respiratory failure aspiration pneumonia with cavitation        No Known Allergies     MAR reviewed and pertinent medications noted or modified as needed     Current Facility-Administered Medications   Medication    NOREPINephrine (LEVOPHED) 8 mg in 5% dextrose 250mL (32 mcg/mL) infusion    propofol (DIPRIVAN) 10 mg/mL infusion    zinc oxide-white petrolatum 17-57 % topical paste    albuterol-ipratropium (DUO-NEB) 2.5 MG-0.5 MG/3 ML    albuterol-ipratropium (DUO-NEB) 2.5 MG-0.5 MG/3 ML    sorbitoL 70 % solution 30 mL    glycopyrrolate (ROBINUL) tablet 0.5 mg    docusate (COLACE) 50 mg/5 mL oral liquid 100 mg    potassium chloride (KLOR-CON) packet for solution 20 mEq    [Held by provider] mirtazapine (REMERON) tablet 15 mg    sodium chloride (NS) flush 5-40 mL    sodium chloride (NS) flush 5-40 mL    acetaminophen (TYLENOL) tablet 650 mg Or    acetaminophen (TYLENOL) suppository 650 mg    polyethylene glycol (MIRALAX) packet 17 g    ondansetron (ZOFRAN ODT) tablet 4 mg    Or    ondansetron (ZOFRAN) injection 4 mg    enoxaparin (LOVENOX) injection 40 mg    piperacillin-tazobactam (ZOSYN) 3.375 g in 0.9% sodium chloride (MBP/ADV) 100 mL MBP      Patient PCP: Unknown, Provider, MD  PMH:  has a past medical history of Cerebellar ataxia ([facility] [address] PSH:   has a past surgical history that includes pr breast surgery procedure unlisted and ir insert non tunl cvc over 5 yrs (10/26/2021). FHX: family history is not on file. SHX:  reports that she has never smoked. She has never used smokeless tobacco. She reports that she does not drink alcohol and does not use drugs. Systemic review unobtainable as the patient is nonverbal      Objective:     Vital Signs: Telemetry:    normal sinus rhythm Intake/Output:   Visit Vitals  /83   Pulse (!) 114   Temp 97.3 °F (36.3 °C)   Resp 8   Ht 5' 6\" (1.676 m)   Wt 34 kg (74 lb 15.3 oz)   LMP  (LMP Unknown)   SpO2 100%   BMI 12.10 kg/m²       Temp (24hrs), Av.8 °F (37.1 °C), Min:97.3 °F (36.3 °C), Max:99.1 °F (37.3 °C)        O2 Device: Ventilator O2 Flow Rate (L/min): 60 l/min       Wt Readings from Last 4 Encounters:   10/21/21 34 kg (74 lb 15.3 oz)        No intake or output data in the 24 hours ending 10/28/21 6930    Last shift:      No intake/output data recorded. Last 3 shifts: 10/26 1901 - 10/28 0700  In: 200   Out: -        Physical Exam:   General:  female; intubated on ventilator  HEENT: NCAT, poor dentition, lips and mucosa dry  Eyes: anicteric; conjunctiva clear random eye movement present  Neck: no nodes, no JVD, no accessory MM use  Chest: no deformity,   Cardiac: Regular rate and rhythm  Lungs: Diminished breath sounds at left side coarse breath sound on the right side  Abd: Thin emaciated bowel sounds present  Ext: no edema; no joint swelling;  No clubbing  : clear urine  Neuro: Eyes are open does not look at voice has some random eye movement contracted extremities  Psych-nonverbal unable to assess  Skin: warm, dry, no cyanosis;   Pulses: Brachial radial femoral pulses intact  Capillary: Normal capillary refill  Neuro contracted upper extremities    Labs:    Recent Labs     10/27/21  0447 10/25/21  1515   WBC 20.9* 6.4   HGB 10.5* 13.0    277     Recent Labs     10/27/21  0447 10/25/21  1330     142 142   K 4.2  4.2 4.8   *  109* 108   CO2 28  28 31   *  139* 100   BUN 9  9 3*   CREA 0.26*  0.26* 0.25*   CA 7.9*  8.0* 8.8   MG  --  2.0   PHOS 2.2* 2.0*   ALB 1.6*  1.7*  --    ALT 40  --    10/20 on 2 L nasal oxygen PO2 115 PCO2 40 pH 7.42  10/20 room air oxygen saturation 98%     Nasal MRSA smear negative  Blood 1 of 4 bottles with gram-positive cocci  Urine culture no growth  COVID-19 antigen negative  Procalcitonin 2.21  Lab Results   Component Value Date/Time    Culture result: No significant growth, <10,000 CFU/mL 10/18/2021 11:15 AM    Culture result: (A) 10/17/2021 05:30 PM     Gram Positive Cocci CALLED TO AND READ BACK BY HOME SPICER R.N. 1030 10/19/2021 BY DPW    Culture result:  10/17/2021 05:30 PM     Staphylococcus species, coagulase negative growing in 1 of 4 bottles drawn NO SITE     Imaging:    CXR Results  (Last 48 hours)               10/19/21 0333  XR CHEST PORT Final result    Impression:  FINDINGS: IMPRESSION: Single frontal view of the chest. Patient's left hand   obscures the left lung/hemithorax. Enteric tube distal tip below left hemidiaphragm. Normal heart size. Similar right perihilar and upper lobe airspace disease. No sizable pleural   effusion or pneumothorax. To me I agree there is right upper lobe infiltrate there is also left basilar infiltrate with obscuration of the hemidiaphragm       Narrative:  Aspiration pneumonia.        Comparison chest x-ray 10/18/2021.           10/18/21 2115  XR CHEST PORT Final result    Impression:  Slightly retracted enteric tube, otherwise unchanged. Narrative:  AP portable chest, 2108 hours. Comparison: Earlier the same day at 2009 hours. Findings: The enteric tube has been retracted slightly. The tip is in the region   of the gastric fundus. The sidehole remains below the diaphragm. Cardiac   monitoring leads overlie the chest.       The heart is normal in size. Multifocal airspace disease and a left-sided   cavitary lesion are again noted. There is no pleural effusion or pneumothorax. There is gaseous distention of the colon. 10/18/21 2017  XR CHEST PORT Final result    Impression:  1. Enteric tube as above. 2. Persistent multifocal airspace disease with a cavitary lesion in the left   lateral lung. Narrative:  AP portable chest, 2009 hours. Comparison: 10/17/2021. Findings: There is an enteric tube in place which coils overlying the gastric   fundus with the tip projected back towards the gastroesophageal junction. Cardiac monitoring leads overlie the chest. The heart is normal in size. There   is persistent dense consolidation in the right perihilar region. There is a   cavitary lesion laterally in the left midlung zone. No pleural effusion or   pneumothorax is identified. The osseous structures are unremarkable. There is   gaseous distention of the bowel. 10/17/21 1512  XR CHEST PORT Final result    Impression:  1. Large right perihilar opacity, mass versus airspace disease. Further   characterization with CT is recommended. 2. Nodular opacity in the left midlung zone. Further characterization with CT is   recommended. Narrative:  AP portable chest, 1500 hours. Comparison: None. Findings: Multiple cardiac monitoring leads overlie the chest. The heart is   normal in size. There is an 8.4 cm right perihilar opacity. There is a 3.7 cm   opacity in the left midlung zone.  There is no pulmonary vascular congestion. No   pleural effusion or pneumothorax is identified. The osseous structures are   unremarkable. Results from Hospital Encounter encounter on 10/17/21    XR CHEST PORT    Narrative  Portable chest:    History:CHF    COMPARISON: Portable chest 10/27/2021    The left forearm is superimposed with the left lung. Minimal hazy infiltration  is seen in the region of the right hilum. No consolidation or pleural fluid. Heart is normal size. There is residual pneumomediastinum with subcutaneous  emphysematous change is in the region of the right shoulder and neck. Endotracheal tube with the tip 3.5 cm above the shahid. Moderate amount of  intestinal gas. Impression  Right perihilar infiltrative changes. Residual pneumomediastinum and  right subcutaneous emphysematous changes. XR CHEST PORT    Narrative  Examination: XR CHEST PORT    History: check ETT placement    Comparison: Chest radiograph 10/27/2021    FINDINGS:    Single frontal portable view of the chest. Endotracheal tube projects over the  mid intrathoracic trachea. Lung volumes are symmetric. Hazy opacities in the lungs, most pronounced in the  left mid to lower region appear similar to prior. Lucencies over the mediastinum  most likely represent pneumomediastinum and appears similar to prior. Very small  anterior pneumothorax on the left is difficult to completely exclude. No  significant pleural effusion is evident. The cardiac silhouette is normal in  size. Hilar prominence appearing similar to prior. Mediastinal contours and  osseous structures appear overall unchanged. There is subcutaneous gas in the  neck extending into the right chest as well. Impression  1. Similar appearance of lucencies paralleling the mediastinum that most likely  represent pneumomediastinum. Small anterior pneumothorax on the left is  difficult to completely exclude but overall appearance is similar to prior.   Recommend continued attention on follow-up. 2. Pulmonary opacities appearing similar to prior. XR CHEST PORT    Narrative  Repeat after critical result. Comparison chest x-ray 10/27/2021 at 0216 hours. Impression  FINDINGS: IMPRESSION: Single frontal view of the chest.    ET tube distal tip 6.7 cm above shahid. Normal heart size. Similar pneumomediastinum. The lungs are well inflated. Right perihilar bronchial thickening and opacity  unchanged. Left hand obscures the left lung. No sizable pleural effusion, lobar  consolidation, or evident pneumothorax. No free air under the diaphragm. PEG tube balloon projects over gastric air  bubble. Previous dense material in the expected location of esophagus has  resolved. Unchanged right greater than left subcutaneous emphysema. Results from [place] encounter on 10/17/21    CTA CHEST W OR W WO CONT    Narrative  Chest CTA    TECHNIQUE: Multiple continuous axial images were obtained from the thoracic  inlet to the upper abdomen after the uneventful administration of intravenous  contrast. Reformatted images as well as maximum intensity projection images were  obtained in the sagittal and coronal planes. Comment on dose reduction: All CT scans at this facility are performed using  dose reduction optimization technique as appropriate to perform the exam  including the following; automated exposure control, adjustments of the mA  and/or kV according to patient size, or use of iterative reconstructed  technique. Comparison examination: Chest radiograph dated October 17, 2021    Findings:  LYMPHADENOPATHY: Mediastinal lymph nodes top normal in size, likely reactive to  the process in the lungs described below. CARDIOVASCULAR: Negative for pulmonary embolus. Heart normal in size. Thoracic  aorta normal in caliber, negative for dissection. Great vessels patent.     LUNGS AND PLEURA: Tree-in-bud centrilobular micronodules diffusely throughout  the lungs with subpleural consolidation present right lower lobe and left  posterior basilar segment. Cavitary lesion with air-fluid level present left  lower lobe measuring approximately 21 mm, cavitary lesion with air-fluid level  medial segment middle lobe measuring 21 mm. INCLUDED ABDOMEN: The visualized upper abdomen images normally. CHEST WALL: No suspicious lytic or blastic lesion is identified. Impression  Negative for pulmonary embolus. Findings the lungs consistent with multilobar pneumonia with concomitant  bronchiolitis. The cavitary foci may represent pneumatoceles or potentially  septic emboli, echocardiogram recommended when clinically able. Discussion patient has gurgling respirations has pharyngeal dysphagia is chronically aspirating. Initial x-ray was suspicious for gastric distention as well very likely has gastroparesis with reflux and aspiration on that. Chest x-ray and CT show bibasilar infiltrates right greater than left consistent with aspiration and there is a cavitary area in the left lung which suggest this is a chronic process. She is on Zosyn I agree with that. Agree n.p.o. status with feeding tube. She likely needs a PEG tube  10/19 now in the ICU due to respiratory distress has a lot of upper airway noise with inability to control oral secretions. Nurses are requesting Robinul however abdominal x-ray is suggestive of ileus with fecal impaction. Robinul could possibly make this worse. Despite this we will place on small dose Robinul. We will give enema start Colace per tube. Nasal MRSA smear and blood culture are negative we will discontinue vancomycin. Potassium to be repleted IV. Sodium remains highly elevated but is minimally improved. 10/20 respirations nonlabored today upper airway noise absent. Will decrease dose Robinul as I am worried about ileus. Abdominal x-ray still suggest fecal impaction.   She received an enema yesterday we will repeat today and add sorbitol per G-tube.   Would continue NG feedings  10/21 chest x-ray shows bilateral infiltrate with cavitary lesion NG tube is in the gastric fundal region  10/22 condition stable this morning saturation 100% on room air  10/26 seconds of movements noted patient intubated on ventilator 100% FiO2 will change the vent settings abdomen X ray chest x-ray did not shows much different from before still possibility of aspiration  10/28 remains on ventilator we will switch her to spontaneous  Time of care 30 minutes    Anya Pollard MD

## 2021-10-28 NOTE — PROGRESS NOTES
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 Patient was noted to be nonverbal, chronically ill looking, contracted and in respiratory distress. Initially oxygen saturations were in the 80s. She was placed on a nonrebreather. CTA of the chest showed multi focal airspace disease and a small cavitary lesion in the left lower lobe. Patient was admitted and started on Zosyn. Family requested DNR CODE STATUS although did agree to intubation if needed    Labs showed a sodium of 164    Patient was initially weaned to room air but on the evening of 10/19 she desaturated and was transferred to the ICU. She was quickly weaned back to room air. She was then transferred back to the floor. NG tube placed and she was started on tube feeds. GI was consulted for consideration of PEG tube    On 10/24 patient redeveloped fever and became hypoxic again, was restarted on oxygen. Patient was transferred back again to the ICU. Patient required intubation early this morning emergently due to worsening respiratory status. PO2 was only 41 on nonrebreather mask    Patient became hypotensive and was started on norepinephrine    ------    Patient is seen today for follow-up. She continues on mechanical ventilation, currently with an FiO2 24%. Chest x-ray shows unchanged pneumomediastinum and subcutaneous air. Right perihilar infiltrate is noted. She continues on norepinephrine at 4 mcg    White count today is 21,000, was normal 2 days ago.   No fever      Problem List:  Problem List as of 10/28/2021 Never Reviewed        Codes Class Noted - Resolved    Severe protein-calorie malnutrition ([facility] [address] ICD-10-CM: E43  ICD-9-CM: 120  10/18/2021 - Present        Acute respiratory failure ([address] ICD-10-CM: J96.00  ICD-9-CM: 518.81  10/17/2021 - Present        Failure to thrive in adult ICD-10-CM: R62.7  ICD-9-CM: 783.7  10/17/2021 - Present        Multifocal pneumonia ICD-10-CM: J18.9  ICD-9-CM: 420  10/17/2021 - Present              Medications reviewed  Current Facility-Administered Medications   Medication Dose Route Frequency    NOREPINephrine (LEVOPHED) 8 mg in 5% dextrose 250mL (32 mcg/mL) infusion  0.5-16 mcg/min IntraVENous TITRATE    propofol (DIPRIVAN) 10 mg/mL infusion  0-50 mcg/kg/min IntraVENous TITRATE    zinc oxide-white petrolatum 17-57 % topical paste   Topical TID    albuterol-ipratropium (DUO-NEB) 2.5 MG-0.5 MG/3 ML  3 mL Nebulization Q6HWA RT    albuterol-ipratropium (DUO-NEB) 2.5 MG-0.5 MG/3 ML  3 mL Nebulization Q6H PRN    sorbitoL 70 % solution 30 mL  30 mL Per G Tube DAILY    glycopyrrolate (ROBINUL) tablet 0.5 mg  0.5 mg Per G Tube BID    docusate (COLACE) 50 mg/5 mL oral liquid 100 mg  100 mg Per G Tube BID    potassium chloride (KLOR-CON) packet for solution 20 mEq  20 mEq Per NG tube TID WITH MEALS    [Held by provider] mirtazapine (REMERON) tablet 15 mg  15 mg Oral QHS    sodium chloride (NS) flush 5-40 mL  5-40 mL IntraVENous Q8H    sodium chloride (NS) flush 5-40 mL  5-40 mL IntraVENous PRN    acetaminophen (TYLENOL) tablet 650 mg  650 mg Oral Q6H PRN    Or    acetaminophen (TYLENOL) suppository 650 mg  650 mg Rectal Q6H PRN    polyethylene glycol (MIRALAX) packet 17 g  17 g Oral DAILY PRN    ondansetron (ZOFRAN ODT) tablet 4 mg  4 mg Oral Q8H PRN    Or    ondansetron (ZOFRAN) injection 4 mg  4 mg IntraVENous Q6H PRN    enoxaparin (LOVENOX) injection 40 mg  40 mg SubCUTAneous DAILY    piperacillin-tazobactam (ZOSYN) 3.375 g in 0.9% sodium chloride (MBP/ADV) 100 mL MBP  3.375 g IntraVENous Q8H       Review of Systems:   Review of systems not obtained due to patient factors.     Objective:   Physical Exam:     Visit Vitals  /83   Pulse (!) 114   Temp 97.3 °F (36.3 °C)   Resp 8   Ht 5' 6\" (1.676 m)   Wt 34 kg (74 lb 15.3 oz)   LMP  (LMP Unknown)   SpO2 100%   BMI 12.10 kg/m²    O2 Flow Rate (L/min): 60 l/min O2 Device: Ventilator    Temp (24hrs), Av.8 °F (37.1 °C), Min:97.3 °F (36.3 °C), Max:99.1 °F (37.3 °C)    No intake/output data recorded. 10/26 1901 - 10/28 0700  In: 290   Out: -     General:   Unresponsive, cachectic and emaciated. Patient intubated   Lungs:    Rhonchi bilateral   Chest wall:  No tenderness or deformity. Heart:  Regular rate and rhythm, S1, S2 normal, no murmur, click, rub or gallop. Abdomen:   Soft, non-tender. Bowel sounds normal. No masses,  No organomegaly. Extremities:  Contracted   Pulses: 2+ and symmetric all extremities. Skin: Skin color, texture, turgor normal. No rashes or lesions   Neurologic: CNII-XII intact.   Contractures of upper and lower extremities         Data Review:       Recent Days:  Recent Labs     10/27/21  0447 10/25/21  1515   WBC 20.9* 6.4   HGB 10.5* 13.0   HCT 32.8* 39.8    277     Recent Labs     10/27/21  0447 10/25/21  1330     142 142   K 4.2  4.2 4.8   *  109* 108   CO2 28  28 31   *  139* 100   BUN 9  9 3*   CREA 0.26*  0.26* 0.25*   CA 7.9*  8.0* 8.8   MG  --  2.0   PHOS 2.2* 2.0*   ALB 1.6*  1.7*  --    TBILI 0.5  --    ALT 40  --      Recent Labs     10/28/21  0330 10/27/21  0333 10/26/21  0720   PH 7.52* 7.49* 7.55*   PCO2 36 39 35   PO2 123* 217* 300*   HCO3 30* 30* 30*   FIO2 28.0 50.0 100.0       24 Hour Results:  Recent Results (from the past 24 hour(s))   GLUCOSE, POC    Collection Time: 10/28/21 12:03 AM   Result Value Ref Range    Glucose (POC) 119 (H) 65 - 117 mg/dL    Performed by Mahendra Mcdonough    BLOOD GAS, ARTERIAL    Collection Time: 10/28/21  3:30 AM   Result Value Ref Range    pH 7.52 (H) 7.35 - 7.45      PCO2 36 35 - 45 mmHg    PO2 123 (H) 75 - 100 mmHg    O2  >95 %    BICARBONATE 30 (H) 22 - 26 mmol/L    BASE EXCESS 5.6 (H) 0 - 2 mmol/L    O2 METHOD VENT      FIO2 28.0 %    MODE SIMV      Tidal volume 350      PRESSURE SUPPORT 8.0      EPAP/CPAP/PEEP 5.0      SITE Right Radial      HUGO'S TEST PASS         XR CHEST PORT   Final Result   Right perihilar infiltrative changes. Residual pneumomediastinum and   right subcutaneous emphysematous changes. XR CHEST PORT   Final Result      1. Similar appearance of lucencies paralleling the mediastinum that most likely   represent pneumomediastinum. Small anterior pneumothorax on the left is   difficult to completely exclude but overall appearance is similar to prior. Recommend continued attention on follow-up. 2. Pulmonary opacities appearing similar to prior. XR CHEST PORT   Final Result   FINDINGS: IMPRESSION: Single frontal view of the chest.      ET tube distal tip 6.7 cm above shahid. Normal heart size. Similar pneumomediastinum. The lungs are well inflated. Right perihilar bronchial thickening and opacity   unchanged. Left hand obscures the left lung. No sizable pleural effusion, lobar   consolidation, or evident pneumothorax. No free air under the diaphragm. PEG tube balloon projects over gastric air   bubble. Previous dense material in the expected location of esophagus has   resolved. Unchanged right greater than left subcutaneous emphysema. XR CHEST PORT   Final Result   FINDINGS: IMPRESSION: Single frontal view of the chest.      ET tube distal tip 6.5 cm above shahid. Normal heart size. Pneumomediastinum and subcutaneous emphysema has developed   since the prior study. No jacey evidence of pneumothorax. Called report to   patient's nurse [facility] at 5:15 AM 10/27/2021. Left hand obscures the left hemithorax. Right parahilar bronchial thickening and   opacity unchanged. No lobar consolidation, sizable pleural effusion. Dense   material projects over the medial left lower hemithorax possibly tube feed   reflux in the esophagus.       PEG tube balloon projects over the stomach. XR ABD PORT  1 V   Final Result      IR INSERT NON TUNL CVC OVER 5 YRS   Final Result   Successful placement of a triple-lumen central venous catheter. The catheter is   ready for use. IR US GUIDED VASCULAR ACCESS   Final Result   Successful placement of a triple-lumen central venous catheter. The catheter is   ready for use. XR ABD (KUB)   Final Result      XR CHEST PORT   Final Result   Endotracheal tube in satisfactory position. Otherwise stable exam.                XR CHEST PORT   Final Result   Elevation of left hemidiaphragm with atelectasis in left base. Increased density in the perihilar regions which may represent atelectasis   and/or developing infiltrate. Aspiration could give this appearance. .                XR CHEST PORT   Final Result   Pulmonary hypoinflation. Unchanged cavitary lesion left lower lobe. XR CHEST PORT   Final Result      XR CHEST PORT   Final Result      XR CHEST PORT   Final Result   Feeding tube as above. No significant interval change. XR CHEST PORT   Final Result   FINDINGS: IMPRESSION: Single frontal view of the abdomen. Enteric tube passes below the left hemidiaphragm, coiled in the gastric fundus   region. Normal heart size. Increased hypoinflation and bilateral pulmonary airspace pneumonia and/or   atelectasis. Cavitary lesion left lower lobe as previous. No large pleural   effusion or pneumothorax. No free air under the diaphragm. XR CHEST PORT   Final Result      XR ABD (KUB)   Final Result      XR ABD PORT  1 V   Final Result      XR CHEST PORT   Final Result   FINDINGS: IMPRESSION: Single frontal view of the chest. Patient's left hand   obscures the left lung/hemithorax. Enteric tube distal tip below left hemidiaphragm. Normal heart size. Similar right perihilar and upper lobe airspace disease. No sizable pleural   effusion or pneumothorax.       XR CHEST PORT   Final Result   Slightly retracted enteric tube, otherwise unchanged. XR CHEST PORT   Final Result   1. Enteric tube as above. 2. Persistent multifocal airspace disease with a cavitary lesion in the left   lateral lung. CTA CHEST W OR W WO CONT   Final Result   Negative for pulmonary embolus. Findings the lungs consistent with multilobar pneumonia with concomitant   bronchiolitis. The cavitary foci may represent pneumatoceles or potentially   septic emboli, echocardiogram recommended when clinically able. XR CHEST PORT   Final Result   1. Large right perihilar opacity, mass versus airspace disease. Further   characterization with CT is recommended. 2. Nodular opacity in the left midlung zone. Further characterization with CT is   recommended. XR NECK SOFT TISSUE   Final Result   No significant soft tissue swelling or radiodense foreign body. CT CHEST WO CONT    (Results Pending)        Assessment:  Multifocal bilateral pneumonia with cavitary lesion left lower lobe   -On Zosyn day #11    Acute respiratory failure with hypoxia, now requiring mechanical ventilation. Patient intubated 10/26    Pneumomediastinum, unchanged    Hypernatremia due to dehydration, improved    Severe sepsis with septic shock with tachycardia and elevated lactate. Present on admission    -Continues on pressors. WBC increasing    Hypokalemia. Repleted    Possible complicated UTI. Urine culture was negative    Hereditary spinocerebellar ataxia with chronic debilitation    Adult failure to thrive    Severe protein calorie malnutrition. Status post PEG tube this admission        Plan:  Continue mechanical ventilation  Continue IV Zosyn  Continue tube feeds and supportive care  Recheck labs in a.m. Overall prognosis poor      Disposition: Continued inpatient care    Total time spent with patient: 30 minutes.     Ildefonso Mattson MD

## 2021-10-29 NOTE — PROGRESS NOTES
reviewed clinical chart. Patient is still being actively treated by internal medicine, pulmonology, and respiratory therapy. Her discharge plan is to return home, where her family provides support and care. CM team will continue to follow.

## 2021-10-29 NOTE — PROGRESS NOTES
Bedside shift change report given to Nolvia Mansfield [id number] (oncoming nurse) by Itzel Gibbons RN (offgoing nurse). Report included the following information SBAR.

## 2021-10-29 NOTE — PROGRESS NOTES
Pulmonary, Critical Care Note    Name: Irvin Sue MRN: [id number]   : 1985 Hospital: [address] [place] [address]   Date: 10/29/2021  Admission date: 10/17/2021 Hospital Day: 13       Subjective/Interval History:   Patient seen on the medical floor. Has best I can understand she is bedbound nonverbal but does eat orally. In any event she has had gurgling respirations were brought to the emergency room has bilateral pneumonia right base greater than left base with a cavitary area in the left lower lung pleural base. She is normally followed at [facility]  10/19 now in the ICU on room air. Apparently had worsening respiratory status last evening and had desaturation was transferred to the ICU has had frequent oral suctioning and is now back on room air  10/20 NG tube in place currently on room air saturation 98% looks at voice otherwise unresponsive  10/26 events noted patient was transferred to ICU now intubated on the ventilator she was put on 100% nonrebreather mask and then to Ventimask but she did not tolerate subsequently got intubated she was hemodynamically unstable on pressors now    Hospital Problems  Never Reviewed        Codes Class Noted POA    Severe protein-calorie malnutrition ([facility] ICD-10-CM: E43  ICD-9-CM: 262  10/18/2021 Unknown        Acute respiratory failure ([facility] ICD-10-CM: J96.00  ICD-9-CM: 518.81  10/17/2021 Unknown        Failure to thrive in adult ICD-10-CM: R62.7  ICD-9-CM: 783.7  10/17/2021 Unknown        Multifocal pneumonia ICD-10-CM: J18.9  ICD-9-CM: 486  10/17/2021 Unknown              IMPRESSION:   1. Acute hypoxic respiratory failure   2. Aspiration pneumonia   3. Hypothermia resolved  4. Pneumomediastinum  5. Subcu emphysema  6. Hypovolemic shock  7. Pharyngeal dysphagia  8. Gastroesophageal reflux disease with gastroparesis  9. Fecal impaction with ileus  10. Ileus has bowel sounds  11. Severe malnutrition  12. We will start IV fluids  Body mass index is 12.1 kg/m². RECOMMENDATIONS/PLAN:   1. Extubated on 10/28  2. She is oxygen 3 L nasal cannula  3. CAT scan of the chest shows pneumomediastinum s/q emphysema and bilateral infiltrate  4. Off vasopressors  5. Patient was having more shortness of breath hypoxic episode received Solu-Medrol  6. Leave on room air NG tube in place for EGD  7. Continue with Zosyn for ongoing aspiration           [x] High complexity decision making was performed  [x] See my orders for details      Subjective/Initial History:     I was asked by Juju Clement MD to see Esequiel Wills  a 39 y.o.  female in consultation for a chief complaint of acute hypoxic respiratory failure aspiration pneumonia with cavitation        No Known Allergies     MAR reviewed and pertinent medications noted or modified as needed     Current Facility-Administered Medications   Medication    NOREPINephrine (LEVOPHED) 8 mg in 5% dextrose 250mL (32 mcg/mL) infusion    propofol (DIPRIVAN) 10 mg/mL infusion    zinc oxide-white petrolatum 17-57 % topical paste    albuterol-ipratropium (DUO-NEB) 2.5 MG-0.5 MG/3 ML    albuterol-ipratropium (DUO-NEB) 2.5 MG-0.5 MG/3 ML    sorbitoL 70 % solution 30 mL    glycopyrrolate (ROBINUL) tablet 0.5 mg    docusate (COLACE) 50 mg/5 mL oral liquid 100 mg    potassium chloride (KLOR-CON) packet for solution 20 mEq    [Held by provider] mirtazapine (REMERON) tablet 15 mg    sodium chloride (NS) flush 5-40 mL    sodium chloride (NS) flush 5-40 mL    acetaminophen (TYLENOL) tablet 650 mg    Or    acetaminophen (TYLENOL) suppository 650 mg    polyethylene glycol (MIRALAX) packet 17 g    ondansetron (ZOFRAN ODT) tablet 4 mg    Or    ondansetron (ZOFRAN) injection 4 mg    enoxaparin (LOVENOX) injection 40 mg    piperacillin-tazobactam (ZOSYN) 3.375 g in 0.9% sodium chloride (MBP/ADV) 100 mL MBP      Patient PCP: Unknown, Provider, MD  PMH:  has a past medical history of Cerebellar ataxia ([facility]   PSH:   has a past surgical history that includes pr breast surgery procedure unlisted and ir insert non tunl cvc over 5 yrs (10/26/2021). FHX: family history is not on file. SHX:  reports that she has never smoked. She has never used smokeless tobacco. She reports that she does not drink alcohol and does not use drugs. Systemic review unobtainable as the patient is nonverbal      Objective:     Vital Signs: Telemetry:    normal sinus rhythm Intake/Output:   Visit Vitals  /89 (BP 1 Location: Left upper arm, BP Patient Position: At rest)   Pulse 99   Temp 99 °F (37.2 °C)   Resp 25   Ht 5' 6\" (1.676 m)   Wt 34 kg (74 lb 15.3 oz)   LMP  (LMP Unknown)   SpO2 100%   BMI 12.10 kg/m²       Temp (24hrs), Av.6 °F (37 °C), Min:97.2 °F (36.2 °C), Max:99.3 °F (37.4 °C)        O2 Device: Nasal cannula O2 Flow Rate (L/min): 3 l/min       Wt Readings from Last 4 Encounters:   10/21/21 34 kg (74 lb 15.3 oz)          Intake/Output Summary (Last 24 hours) at 10/29/2021 1147  Last data filed at 10/29/2021 0600  Gross per 24 hour   Intake 995 ml   Output    Net 995 ml       Last shift:      No intake/output data recorded. Last 3 shifts: 10/27 1901 - 10/29 0700  In: 995 [I.V.:100]  Out: -        Physical Exam:   General:  female; awake having gurgling sound  HEENT: NCAT, poor dentition, lips and mucosa dry  Eyes: anicteric; conjunctiva clear random eye movement present  Neck: no nodes, no JVD, no accessory MM use  Chest: no deformity,   Cardiac: Regular rate and rhythm  Lungs: Diminished breath sounds at left side coarse breath sound on the right side subcu emphysema felt up to the neck  Abd: Thin emaciated bowel sounds present  Ext: no edema; no joint swelling;  No clubbing  : clear urine  Neuro: Awake does not follow commands  Psych-nonverbal unable to assess  Skin: warm, dry, no cyanosis;   Pulses: Brachial radial femoral pulses intact  Capillary: Normal capillary refill  Neuro contracted upper extremities    Labs:    Recent Labs     10/29/21  0539 10/27/21  0447   WBC 10.0 20.9*   HGB 10.0* 10.5*    240     Recent Labs     10/29/21  0539 10/27/21  0447    143  142   K 3.9 4.2  4.2   * 109*  109*   CO2 30 28  28   GLU 96 140*  139*   BUN 9 9  9   CREA <0.15* 0.26*  0.26*   CA 8.0* 7.9*  8.0*   PHOS 2.9 2.2*   ALB 1.7* 1.6*  1.7*   ALT  --  40   10/20 on 2 L nasal oxygen PO2 115 PCO2 40 pH 7.42  10/20 room air oxygen saturation 98%     Nasal MRSA smear negative  Blood 1 of 4 bottles with gram-positive cocci  Urine culture no growth  COVID-19 antigen negative  Procalcitonin 2.21  Lab Results   Component Value Date/Time    Culture result: No significant growth, <10,000 CFU/mL 10/18/2021 11:15 AM    Culture result: (A) 10/17/2021 05:30 PM     Gram Positive Cocci CALLED TO AND READ BACK BY HOME Doe R.N. 1030 10/19/2021 BY DPW    Culture result:  10/17/2021 05:30 PM     Staphylococcus species, coagulase negative growing in 1 of 4 bottles drawn NO SITE     Imaging:    CXR Results  (Last 48 hours)               10/19/21 0333  XR CHEST PORT Final result    Impression:  FINDINGS: IMPRESSION: Single frontal view of the chest. Patient's left hand   obscures the left lung/hemithorax. Enteric tube distal tip below left hemidiaphragm. Normal heart size. Similar right perihilar and upper lobe airspace disease. No sizable pleural   effusion or pneumothorax. To me I agree there is right upper lobe infiltrate there is also left basilar infiltrate with obscuration of the hemidiaphragm       Narrative:  Aspiration pneumonia. Comparison chest x-ray 10/18/2021.           10/18/21 2115  XR CHEST PORT Final result    Impression:  Slightly retracted enteric tube, otherwise unchanged. Narrative:  AP portable chest, 2108 hours. Comparison: Earlier the same day at 2009 hours. Findings: The enteric tube has been retracted slightly.  The tip is in the region of the gastric fundus. The sidehole remains below the diaphragm. Cardiac   monitoring leads overlie the chest.       The heart is normal in size. Multifocal airspace disease and a left-sided   cavitary lesion are again noted. There is no pleural effusion or pneumothorax. There is gaseous distention of the colon. 10/18/21 2017  XR CHEST PORT Final result    Impression:  1. Enteric tube as above. 2. Persistent multifocal airspace disease with a cavitary lesion in the left   lateral lung. Narrative:  AP portable chest, 2009 hours. Comparison: 10/17/2021. Findings: There is an enteric tube in place which coils overlying the gastric   fundus with the tip projected back towards the gastroesophageal junction. Cardiac monitoring leads overlie the chest. The heart is normal in size. There   is persistent dense consolidation in the right perihilar region. There is a   cavitary lesion laterally in the left midlung zone. No pleural effusion or   pneumothorax is identified. The osseous structures are unremarkable. There is   gaseous distention of the bowel. 10/17/21 1512  XR CHEST PORT Final result    Impression:  1. Large right perihilar opacity, mass versus airspace disease. Further   characterization with CT is recommended. 2. Nodular opacity in the left midlung zone. Further characterization with CT is   recommended. Narrative:  AP portable chest, 1500 hours. Comparison: None. Findings: Multiple cardiac monitoring leads overlie the chest. The heart is   normal in size. There is an 8.4 cm right perihilar opacity. There is a 3.7 cm   opacity in the left midlung zone. There is no pulmonary vascular congestion. No   pleural effusion or pneumothorax is identified. The osseous structures are   unremarkable.                Results from [place] encounter on 10/17/21    XR CHEST PORT    Narrative  Portable chest:    History:CHF    COMPARISON: Portable chest 10/27/2021    The left forearm is superimposed with the left lung. Minimal hazy infiltration  is seen in the region of the right hilum. No consolidation or pleural fluid. Heart is normal size. There is residual pneumomediastinum with subcutaneous  emphysematous change is in the region of the right shoulder and neck. Endotracheal tube with the tip 3.5 cm above the shahid. Moderate amount of  intestinal gas. Impression  Right perihilar infiltrative changes. Residual pneumomediastinum and  right subcutaneous emphysematous changes. XR CHEST PORT    Narrative  Examination: XR CHEST PORT    History: check ETT placement    Comparison: Chest radiograph 10/27/2021    FINDINGS:    Single frontal portable view of the chest. Endotracheal tube projects over the  mid intrathoracic trachea. Lung volumes are symmetric. Hazy opacities in the lungs, most pronounced in the  left mid to lower region appear similar to prior. Lucencies over the mediastinum  most likely represent pneumomediastinum and appears similar to prior. Very small  anterior pneumothorax on the left is difficult to completely exclude. No  significant pleural effusion is evident. The cardiac silhouette is normal in  size. Hilar prominence appearing similar to prior. Mediastinal contours and  osseous structures appear overall unchanged. There is subcutaneous gas in the  neck extending into the right chest as well. Impression  1. Similar appearance of lucencies paralleling the mediastinum that most likely  represent pneumomediastinum. Small anterior pneumothorax on the left is  difficult to completely exclude but overall appearance is similar to prior. Recommend continued attention on follow-up. 2. Pulmonary opacities appearing similar to prior. XR CHEST PORT    Narrative  Repeat after critical result. Comparison chest x-ray 10/27/2021 at 0216 hours.     Impression  FINDINGS: IMPRESSION: Single frontal view of the chest.    ET tube distal tip 6.7 cm above shahid. Normal heart size. Similar pneumomediastinum. The lungs are well inflated. Right perihilar bronchial thickening and opacity  unchanged. Left hand obscures the left lung. No sizable pleural effusion, lobar  consolidation, or evident pneumothorax. No free air under the diaphragm. PEG tube balloon projects over gastric air  bubble. Previous dense material in the expected location of esophagus has  resolved. Unchanged right greater than left subcutaneous emphysema. Results from [place] encounter on 10/17/21    CT CHEST WO CONT    Narrative  CT dose reduction was achieved through use of a standardized protocol tailored  for this examination and automatic exposure control for dose modulation. Noncontrast study shows dense and patchy consolidation in both lower lobes than  mild patchy ground glass and micronodular infiltrates in the left upper lobe,  and also mainly in the adjacent portions of the lower lungs, to the more dense  pneumonia. Microcystic change in the right lower lobe component without dominant  cavity. There is moderate pneumomediastinum, mainly anterior, contiguous with extensive  subcutaneous emphysema extending up into the lower neck and mostly around the  right chest. The CT of October 17 is not available for direct comparison. No mediastinal or hilar adenopathy. Normal caliber aorta. Small left pleural  effusion. No significant upper abdominal finding    Impression  Continued dense and patchy multifocal pneumonia and pneumonitis,  left greater than right with extensive subcutaneous emphysema and  pneumomediastinum    Discussion patient has gurgling respirations has pharyngeal dysphagia is chronically aspirating. Initial x-ray was suspicious for gastric distention as well very likely has gastroparesis with reflux and aspiration on that.   Chest x-ray and CT show bibasilar infiltrates right greater than left consistent with aspiration and there is a cavitary area in the left lung which suggest this is a chronic process. She is on Zosyn I agree with that. Agree n.p.o. status with feeding tube. She likely needs a PEG tube  10/19 now in the ICU due to respiratory distress has a lot of upper airway noise with inability to control oral secretions. Nurses are requesting Robinul however abdominal x-ray is suggestive of ileus with fecal impaction. Robinul could possibly make this worse. Despite this we will place on small dose Robinul. We will give enema start Colace per tube. Nasal MRSA smear and blood culture are negative we will discontinue vancomycin. Potassium to be repleted IV. Sodium remains highly elevated but is minimally improved. 10/20 respirations nonlabored today upper airway noise absent. Will decrease dose Robinul as I am worried about ileus. Abdominal x-ray still suggest fecal impaction. She received an enema yesterday we will repeat today and add sorbitol per G-tube.   Would continue NG feedings  10/21 chest x-ray shows bilateral infiltrate with cavitary lesion NG tube is in the gastric fundal region  10/22 condition stable this morning saturation 100% on room air  10/26 seconds of movements noted patient intubated on ventilator 100% FiO2 will change the vent settings abdomen X ray chest x-ray did not shows much different from before still possibility of aspiration  10/28 remains on ventilator we will switch her to spontaneous  10/29 extubated now on nasal cannula  Time of care 30 minutes    Carrillo Hanson MD

## 2021-10-29 NOTE — PROGRESS NOTES
Comprehensive Nutrition Assessment    Type and Reason for Visit: Reassess (interim)    Nutrition Recommendations/Plan:   Adjust TF via PEG to Osmolite 1.2 at goal of 45ml/hr  Flush with 120ml q6h or per MD      Provides 1296kcal (95%), 60g pro (118%), and 1360ml (101%)     Document TF formula, TF rate, flushes, and GRVS in I/O's    As pt is d/c rec'd   Jevity 1.5 via PEG at 220ml, 4x daily, bolus  Flush with 205ml after each feed or per MD      Provides 1320kcal (100%), 56g pro (112%), and 1500ml (100%)     Nutrition Assessment:  Admitted for sudden onset of gurgling and respiratory distress witnessed by family. Patient is nonverbal, chronically ill looking; MD rec'd palliative but family would like to continue care. Worsening resp status, transferred in and out of ICU multiple times. Previously intubated and sedated (10/25) receiving osmolite with good tolerance and minimal GRVs. Then extubated 10/28. RD to update TF recs s/p extubation. Earlier in admit, NG placed for nutrition (10/18); PEG placed on 10/23, started on continuous feeds with Jevity for concern of refeeding. Tolerated well. Labs: Glu POC , H/H 10/31, Cr 0.15, Ca 8.0. Meds: colace, enoxaparin, zosyn, KCl, IVF, sorbitol. Malnutrition Assessment:  Malnutrition Status:  Severe malnutrition    Context:  Chronic illness     Findings of the 6 clinical characteristics of malnutrition:   Energy Intake:  No significant decrease in energy intake  Weight Loss:  Unable to assess     Body Fat Loss:  7 - Severe body fat loss, Fat overlying ribs, Orbital, Triceps   Muscle Mass Loss:  7 - Severe muscle mass loss, Calf (gastrocnemius), Clavicles (pectoralis &deltoids), Hand (interosseous), Thigh (quadriceps), Temples (temporalis)  Fluid Accumulation:  No significant fluid accumulation,      Estimated Daily Nutrient Needs:  Energy (kcal): 1360kcal (40kcal/kg); Weight Used for Energy Requirements: Current  Protein (g): 51g (1.5g/kg);  Weight Used for Protein Requirements: Current  Fluid (ml/day): 1360ml; Method Used for Fluid Requirements: 1 ml/kcal    Nutrition Related Findings:  NFPE showing severe wasting throughout body. Extremely cachetic. No known N/V/D/C. Last BM 10/26, soft. Requires sorbital and colace for regular BM. BM every 2 days appears normal for pt. No edema. Wounds:    None       Current Nutrition Therapies:  DIET NPO  ADULT TUBE FEEDING PEG; Standard without Fiber; Delivery Method: Continuous; Continuous Initial Rate (mL/hr): 35; Continuous Advance Tube Feeding: No; Water Flush Volume (mL): 110; Water Flush Frequency: Q 6 hours    Anthropometric Measures:  · Height:  5' 6\" (167.6 cm)  · Current Body Wt:  34 kg (74 lb 15.3 oz)   · Usual Body Wt:  31.3 kg (69 lb)     · Ideal Body Wt:  130 lbs:  57.7 %   · BMI Category:  Underweight (BMI less than 22) age over 72       Nutrition Diagnosis:   · Inadequate oral intake related to cognitive or neurological impairment as evidenced by BMI, severe muscle loss, severe loss of subcutaneous fat    Nutrition Interventions:   Food and/or Nutrient Delivery: Continue tube feeding  Nutrition Education and Counseling: Education not indicated  Coordination of Nutrition Care: Continue to monitor while inpatient    Goals:  Pt to meet >75% of EEN via NGT within 3 days. Wt gain +0.5kg/week. Lytes WNL.        Nutrition Monitoring and Evaluation:   Behavioral-Environmental Outcomes: None identified  Food/Nutrient Intake Outcomes: Enteral nutrition intake/tolerance  Physical Signs/Symptoms Outcomes: Biochemical data, Weight, Nutrition focused physical findings    Discharge Planning:    Enteral nutrition     Electronically signed by Andrew Ji RD on 10/29/2021 at 10:14 AM    Contact: [phone]

## 2021-10-30 NOTE — PROGRESS NOTES
Bedside shift change report given to Nolvia Mansfield 1729 (oncoming nurse) by Donaldo Funes RN (offgoing nurse). Report included the following information SBAR.

## 2021-10-30 NOTE — PROGRESS NOTES
Pulmonary, Critical Care Note    Name: Sundar Badillo MRN: [id number]   : 1985 Hospital: [address] [place] [address]   Date: 10/30/2021  Admission date: 10/17/2021 Hospital Day: 14       Subjective/Interval History:   Patient seen on the medical floor. Has best I can understand she is bedbound nonverbal but does eat orally. In any event she has had gurgling respirations were brought to the emergency room has bilateral pneumonia right base greater than left base with a cavitary area in the left lower lung pleural base. She is normally followed at [facility]  10/19 now in the ICU on room air. Apparently had worsening respiratory status last evening and had desaturation was transferred to the ICU has had frequent oral suctioning and is now back on room air  10/20 NG tube in place currently on room air saturation 98% looks at voice otherwise unresponsive  10/26 events noted patient was transferred to ICU now intubated on the ventilator she was put on 100% nonrebreather mask and then to Ventimask but she did not tolerate subsequently got intubated she was hemodynamically unstable on pressors now  10/30 now on nasal oxygen 5 L saturation 100% looks at voice does not speak or follow commands    Hospital Problems  Never Reviewed        Codes Class Noted POA    Severe protein-calorie malnutrition ([facility] ICD-10-CM: E43  ICD-9-CM: 262  10/18/2021 Unknown        Acute respiratory failure ([facility] ICD-10-CM: J96.00  ICD-9-CM: 518.81  10/17/2021 Unknown        Failure to thrive in adult ICD-10-CM: R62.7  ICD-9-CM: 783.7  10/17/2021 Unknown        Multifocal pneumonia ICD-10-CM: J18.9  ICD-9-CM: 486  10/17/2021 Unknown              IMPRESSION:   1. Acute hypoxic respiratory failure improving will decrease oxygen to 3 L  2. Aspiration pneumonia diffuse bilateral infiltrates on last chest x-ray will repeat in a.m. 3. Hypothermia resolved  4. Pneumomediastinum extensive subcutaneous emphysema persists  5.  Subcu emphysema  6. Hypovolemic shock now off pressors  7. Pharyngeal dysphagia probable continued aspiration will increase Robinul  8. Gastroesophageal reflux disease with gastroparesis  9. Fecal impaction with ileus we will repeat KUB in a.m.  10. Ileus has bowel sounds  11. Severe malnutrition  Body mass index is 12.1 kg/m². RECOMMENDATIONS/PLAN:   1. Extubated on 10/28 nasal oxygen increased to 5 L overnight have decreased back to 3  2. CAT scan of the chest shows pneumomediastinum s/q emphysema and bilateral infiltrate  3. Off vasopressors  4. Continue tube feedings per PEG  5. Remains on Zosyn          [x] High complexity decision making was performed  [x] See my orders for details      Subjective/Initial History:     I was asked by Dolores Siddiqui MD to see Judge Vincent  a 39 y.o.    female in consultation for a chief complaint of acute hypoxic respiratory failure aspiration pneumonia with cavitation        No Known Allergies     MAR reviewed and pertinent medications noted or modified as needed     Current Facility-Administered Medications   Medication    NOREPINephrine (LEVOPHED) 8 mg in 5% dextrose 250mL (32 mcg/mL) infusion    propofol (DIPRIVAN) 10 mg/mL infusion    zinc oxide-white petrolatum 17-57 % topical paste    albuterol-ipratropium (DUO-NEB) 2.5 MG-0.5 MG/3 ML    albuterol-ipratropium (DUO-NEB) 2.5 MG-0.5 MG/3 ML    sorbitoL 70 % solution 30 mL    glycopyrrolate (ROBINUL) tablet 0.5 mg    docusate (COLACE) 50 mg/5 mL oral liquid 100 mg    potassium chloride (KLOR-CON) packet for solution 20 mEq    [Held by provider] mirtazapine (REMERON) tablet 15 mg    sodium chloride (NS) flush 5-40 mL    sodium chloride (NS) flush 5-40 mL    acetaminophen (TYLENOL) tablet 650 mg    Or    acetaminophen (TYLENOL) suppository 650 mg    polyethylene glycol (MIRALAX) packet 17 g    ondansetron (ZOFRAN ODT) tablet 4 mg    Or    ondansetron (ZOFRAN) injection 4 mg    enoxaparin (LOVENOX) injection 40 mg    piperacillin-tazobactam (ZOSYN) 3.375 g in 0.9% sodium chloride (MBP/ADV) 100 mL MBP      Patient PCP: Unknown, Provider, MD  PMH:  has a past medical history of Cerebellar ataxia ([address] PSH:   has a past surgical history that includes pr breast surgery procedure unlisted and ir insert non tunl cvc over 5 yrs (10/26/2021). FHX: family history is not on file. SHX:  reports that she has never smoked. She has never used smokeless tobacco. She reports that she does not drink alcohol and does not use drugs. Systemic review unobtainable as the patient is nonverbal      Objective:     Vital Signs: Telemetry:    normal sinus rhythm Intake/Output:   Visit Vitals  BP (!) 120/90 (BP Patient Position: At rest)   Pulse (!) 118   Temp 99.5 °F (37.5 °C)   Resp 19   Ht 5' 6\" (1.676 m)   Wt 34 kg (74 lb 15.3 oz)   LMP  (LMP Unknown)   SpO2 97%   BMI 12.10 kg/m²       Temp (24hrs), Av.4 °F (37.4 °C), Min:98.8 °F (37.1 °C), Max:99.7 °F (37.6 °C)        O2 Device: Nasal cannula O2 Flow Rate (L/min): 5 l/min       Wt Readings from Last 4 Encounters:   10/21/21 34 kg (74 lb 15.3 oz)          Intake/Output Summary (Last 24 hours) at 10/30/2021 0914  Last data filed at 10/30/2021 0300  Gross per 24 hour   Intake 490 ml   Output    Net 490 ml       Last shift:      No intake/output data recorded. Last 3 shifts: 10/28 1901 - 10/30 0700  In: 1485 [I.V.:100]  Out: -        Physical Exam:   General:  female; eyes are open looks at voice follows no commands  HEENT: NCAT, oral mucosa clear poor dentition  Eyes: anicteric; conjunctiva clear tracks with eyes  Neck: no nodes, no JVD, no accessory MM use has subcutaneous emphysema  Chest: no deformity, has subcutaneous emphysema  Cardiac: Regular rate and rhythm  Lungs: Clear anteriorly and laterally with just a few scattered rhonchi  Abd: Thin emaciated bowel sounds present  Ext: no edema; no joint swelling;  No clubbing  : clear urine  Neuro: Awake tracks with her eyes does not speak does not follow commands  Psych-nonverbal unable to assess  Skin: warm, dry, no cyanosis;   Pulses: Brachial radial femoral pulses intact  Capillary: Normal capillary refill  Neuro contracted upper extremities    Labs:    Recent Labs     10/29/21  0539   WBC 10.0   HGB 10.0*        Recent Labs     10/29/21  0539      K 3.9   *   CO2 30   GLU 96   BUN 9   CREA <0.15*   CA 8.0*   PHOS 2.9   ALB 1.7*   10/30 on 5 L nasal oxygen saturation 100%  10/30 on 3 L oxygen saturation 98%   10/20 on 2 L nasal oxygen PO2 115 PCO2 40 pH 7.42    Nasal MRSA smear negative  Blood 1 of 4 bottles with gram-positive cocci  Urine culture no growth  COVID-19 antigen negative  Procalcitonin 2.21  Lab Results   Component Value Date/Time    Culture result: No significant growth, <10,000 CFU/mL 10/18/2021 11:15 AM    Culture result: (A) 10/17/2021 05:30 PM     Gram Positive Cocci CALLED TO AND READ BACK BY HOME SPICER R.N. 1030 10/19/2021 BY DPW    Culture result:  10/17/2021 05:30 PM     Staphylococcus species, coagulase negative growing in 1 of 4 bottles drawn NO SITE     Imaging:    CXR Results  (Last 48 hours)               10/19/21 0333  XR CHEST PORT Final result    Impression:  FINDINGS: IMPRESSION: Single frontal view of the chest. Patient's left hand   obscures the left lung/hemithorax. Enteric tube distal tip below left hemidiaphragm. Normal heart size. Similar right perihilar and upper lobe airspace disease. No sizable pleural   effusion or pneumothorax. To me I agree there is right upper lobe infiltrate there is also left basilar infiltrate with obscuration of the hemidiaphragm       Narrative:  Aspiration pneumonia. Comparison chest x-ray 10/18/2021.           10/18/21 2115  XR CHEST PORT Final result    Impression:  Slightly retracted enteric tube, otherwise unchanged. Narrative:  AP portable chest, 2108 hours. Comparison: Earlier the same day at 2009 hours. Findings: The enteric tube has been retracted slightly. The tip is in the region   of the gastric fundus. The sidehole remains below the diaphragm. Cardiac   monitoring leads overlie the chest.       The heart is normal in size. Multifocal airspace disease and a left-sided   cavitary lesion are again noted. There is no pleural effusion or pneumothorax. There is gaseous distention of the colon. 10/18/21 2017  XR CHEST PORT Final result    Impression:  1. Enteric tube as above. 2. Persistent multifocal airspace disease with a cavitary lesion in the left   lateral lung. Narrative:  AP portable chest, 2009 hours. Comparison: 10/17/2021. Findings: There is an enteric tube in place which coils overlying the gastric   fundus with the tip projected back towards the gastroesophageal junction. Cardiac monitoring leads overlie the chest. The heart is normal in size. There   is persistent dense consolidation in the right perihilar region. There is a   cavitary lesion laterally in the left midlung zone. No pleural effusion or   pneumothorax is identified. The osseous structures are unremarkable. There is   gaseous distention of the bowel. 10/17/21 1512  XR CHEST PORT Final result    Impression:  1. Large right perihilar opacity, mass versus airspace disease. Further   characterization with CT is recommended. 2. Nodular opacity in the left midlung zone. Further characterization with CT is   recommended. Narrative:  AP portable chest, 1500 hours. Comparison: None. Findings: Multiple cardiac monitoring leads overlie the chest. The heart is   normal in size. There is an 8.4 cm right perihilar opacity. There is a 3.7 cm   opacity in the left midlung zone. There is no pulmonary vascular congestion. No   pleural effusion or pneumothorax is identified. The osseous structures are   unremarkable.                Results from Hospital Encounter encounter on 10/17/21    XR CHEST PORT    Narrative  Portable chest:    History:CHF    COMPARISON: Portable chest 10/27/2021    The left forearm is superimposed with the left lung. Minimal hazy infiltration  is seen in the region of the right hilum. No consolidation or pleural fluid. Heart is normal size. There is residual pneumomediastinum with subcutaneous  emphysematous change is in the region of the right shoulder and neck. Endotracheal tube with the tip 3.5 cm above the shahid. Moderate amount of  intestinal gas. Impression  Right perihilar infiltrative changes. Residual pneumomediastinum and  right subcutaneous emphysematous changes. XR CHEST PORT    Narrative  Examination: XR CHEST PORT    History: check ETT placement    Comparison: Chest radiograph 10/27/2021    FINDINGS:    Single frontal portable view of the chest. Endotracheal tube projects over the  mid intrathoracic trachea. Lung volumes are symmetric. Hazy opacities in the lungs, most pronounced in the  left mid to lower region appear similar to prior. Lucencies over the mediastinum  most likely represent pneumomediastinum and appears similar to prior. Very small  anterior pneumothorax on the left is difficult to completely exclude. No  significant pleural effusion is evident. The cardiac silhouette is normal in  size. Hilar prominence appearing similar to prior. Mediastinal contours and  osseous structures appear overall unchanged. There is subcutaneous gas in the  neck extending into the right chest as well. Impression  1. Similar appearance of lucencies paralleling the mediastinum that most likely  represent pneumomediastinum. Small anterior pneumothorax on the left is  difficult to completely exclude but overall appearance is similar to prior. Recommend continued attention on follow-up. 2. Pulmonary opacities appearing similar to prior.       XR CHEST PORT    Narrative  Repeat after critical result. Comparison chest x-ray 10/27/2021 at 0216 hours. Impression  FINDINGS: IMPRESSION: Single frontal view of the chest.    ET tube distal tip 6.7 cm above shahid. Normal heart size. Similar pneumomediastinum. The lungs are well inflated. Right perihilar bronchial thickening and opacity  unchanged. Left hand obscures the left lung. No sizable pleural effusion, lobar  consolidation, or evident pneumothorax. No free air under the diaphragm. PEG tube balloon projects over gastric air  bubble. Previous dense material in the expected location of esophagus has  resolved. Unchanged right greater than left subcutaneous emphysema. Results from [place] encounter on 10/17/21    CT CHEST WO CONT    Narrative  CT dose reduction was achieved through use of a standardized protocol tailored  for this examination and automatic exposure control for dose modulation. Noncontrast study shows dense and patchy consolidation in both lower lobes than  mild patchy ground glass and micronodular infiltrates in the left upper lobe,  and also mainly in the adjacent portions of the lower lungs, to the more dense  pneumonia. Microcystic change in the right lower lobe component without dominant  cavity. There is moderate pneumomediastinum, mainly anterior, contiguous with extensive  subcutaneous emphysema extending up into the lower neck and mostly around the  right chest. The CT of October 17 is not available for direct comparison. No mediastinal or hilar adenopathy. Normal caliber aorta. Small left pleural  effusion. No significant upper abdominal finding    Impression  Continued dense and patchy multifocal pneumonia and pneumonitis,  left greater than right with extensive subcutaneous emphysema and  pneumomediastinum    Discussion patient has gurgling respirations has pharyngeal dysphagia is chronically aspirating.   Initial x-ray was suspicious for gastric distention as well very likely has gastroparesis with reflux and aspiration on that. Chest x-ray and CT show bibasilar infiltrates right greater than left consistent with aspiration and there is a cavitary area in the left lung which suggest this is a chronic process. She is on Zosyn I agree with that. Agree n.p.o. status with feeding tube. She likely needs a PEG tube  10/19 now in the ICU due to respiratory distress has a lot of upper airway noise with inability to control oral secretions. Nurses are requesting Robinul however abdominal x-ray is suggestive of ileus with fecal impaction. Robinul could possibly make this worse. Despite this we will place on small dose Robinul. We will give enema start Colace per tube. Nasal MRSA smear and blood culture are negative we will discontinue vancomycin. Potassium to be repleted IV. Sodium remains highly elevated but is minimally improved. 10/20 respirations nonlabored today upper airway noise absent. Will decrease dose Robinul as I am worried about ileus. Abdominal x-ray still suggest fecal impaction. She received an enema yesterday we will repeat today and add sorbitol per G-tube. Would continue NG feedings  10/21 chest x-ray shows bilateral infiltrate with cavitary lesion NG tube is in the gastric fundal region  10/22 condition stable this morning saturation 100% on room air  10/26 seconds of movements noted patient intubated on ventilator 100% FiO2 will change the vent settings abdomen X ray chest x-ray did not shows much different from before still possibility of aspiration  10/28 remains on ventilator we will switch her to spontaneous  10/29 extubated now on nasal cannula  10/30 nasal oxygen up to 5 L have decreased back to 3 L. Assumed continued aspiration oral secretions will increase Robinul to 3 times daily.   Will repeat KUB in a.m. if still evidence of fecal impaction we will add Reglan  Time of care 30 minutes    Sophia Luque MD

## 2021-10-30 NOTE — PROGRESS NOTES
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 Patient was noted to be nonverbal, chronically ill looking, contracted and in respiratory distress. Initially oxygen saturations were in the 80s. She was placed on a nonrebreather. CTA of the chest showed multi focal airspace disease and a small cavitary lesion in the left lower lobe. Patient was admitted and started on Zosyn. Family requested DNR CODE STATUS although did agree to intubation if needed    Labs showed a sodium of 164    Patient was initially weaned to room air but on the evening of 10/19 she desaturated and was transferred to the ICU. She was quickly weaned back to room air. She was then transferred back to the floor. NG tube placed and she was started on tube feeds. GI was consulted for consideration of PEG tube    On 10/24 patient redeveloped fever and became hypoxic again, was restarted on oxygen. Patient was transferred back again to the ICU. Patient required intubation on 10/26 due to worsening respiratory status. PO2 was only 41 on nonrebreather mask    Patient became hypotensive and was started on norepinephrine    ------    Patient is seen today for follow-up. Patient continues on 5 L nasal cannula with acceptable saturations. Her chest CT confirmed pneumomediastinum and bilateral airspace disease.     Problem List:  Problem List as of 10/30/2021 Never Reviewed        Codes Class Noted - Resolved    Severe protein-calorie malnutrition ([facility] ICD-10-CM: E43  ICD-9-CM: 262  10/18/2021 - Present        Acute respiratory failure ([facility] ICD-10-CM: J96.00  ICD-9-CM: 518.81  10/17/2021 - Present        Failure to thrive in adult ICD-10-CM: R62.7  ICD-9-CM: 783.7  10/17/2021 - Present        Multifocal pneumonia ICD-10-CM: J18.9  ICD-9-CM: 405  10/17/2021 - Present              Medications reviewed  Current Facility-Administered Medications   Medication Dose Route Frequency    NOREPINephrine (LEVOPHED) 8 mg in 5% dextrose 250mL (32 mcg/mL) infusion  0.5-16 mcg/min IntraVENous TITRATE    propofol (DIPRIVAN) 10 mg/mL infusion  0-50 mcg/kg/min IntraVENous TITRATE    zinc oxide-white petrolatum 17-57 % topical paste   Topical TID    albuterol-ipratropium (DUO-NEB) 2.5 MG-0.5 MG/3 ML  3 mL Nebulization Q6HWA RT    albuterol-ipratropium (DUO-NEB) 2.5 MG-0.5 MG/3 ML  3 mL Nebulization Q6H PRN    sorbitoL 70 % solution 30 mL  30 mL Per G Tube DAILY    glycopyrrolate (ROBINUL) tablet 0.5 mg  0.5 mg Per G Tube BID    docusate (COLACE) 50 mg/5 mL oral liquid 100 mg  100 mg Per G Tube BID    potassium chloride (KLOR-CON) packet for solution 20 mEq  20 mEq Per NG tube TID WITH MEALS    [Held by provider] mirtazapine (REMERON) tablet 15 mg  15 mg Oral QHS    sodium chloride (NS) flush 5-40 mL  5-40 mL IntraVENous Q8H    sodium chloride (NS) flush 5-40 mL  5-40 mL IntraVENous PRN    acetaminophen (TYLENOL) tablet 650 mg  650 mg Oral Q6H PRN    Or    acetaminophen (TYLENOL) suppository 650 mg  650 mg Rectal Q6H PRN    polyethylene glycol (MIRALAX) packet 17 g  17 g Oral DAILY PRN    ondansetron (ZOFRAN ODT) tablet 4 mg  4 mg Oral Q8H PRN    Or    ondansetron (ZOFRAN) injection 4 mg  4 mg IntraVENous Q6H PRN    enoxaparin (LOVENOX) injection 40 mg  40 mg SubCUTAneous DAILY    piperacillin-tazobactam (ZOSYN) 3.375 g in 0.9% sodium chloride (MBP/ADV) 100 mL MBP  3.375 g IntraVENous Q8H       Review of Systems:   Review of systems not obtained due to patient factors.     Objective:   Physical Exam:     Visit Vitals  BP (!) 120/90 (BP Patient Position: At rest)   Pulse (!) 118   Temp 99.5 °F (37.5 °C)   Resp 19   Ht 5' 6\" (1.676 m)   Wt 34 kg (74 lb 15.3 oz)   LMP  (LMP Unknown)   SpO2 97%   BMI 12.10 kg/m²    O2 Flow Rate (L/min): 5 l/min O2 Device: Nasal cannula    Temp (24hrs), Av.4 °F (37.4 °C), Min:98.8 °F (37.1 °C), Max:99.7 °F (37.6 °C)    No intake/output data recorded. 10/28 190 - 10/30 0700  In: 1485 [I.V.:100]  Out: -     General:   Unresponsive, cachectic and emaciated. Patient intubated   Lungs:    Rhonchi bilateral   Chest wall:  No tenderness or deformity. Heart:  Regular rate and rhythm, S1, S2 normal, no murmur, click, rub or gallop. Abdomen:   Soft, non-tender. Bowel sounds normal. No masses,  No organomegaly. Extremities:  Contracted   Pulses: 2+ and symmetric all extremities. Skin: Skin color, texture, turgor normal. No rashes or lesions   Neurologic: CNII-XII intact. Contractures of upper and lower extremities         Data Review:       Recent Days:  Recent Labs     10/29/21  0539   WBC 10.0   HGB 10.0*   HCT 31.0*        Recent Labs     10/29/21  0539      K 3.9   *   CO2 30   GLU 96   BUN 9   CREA <0.15*   CA 8.0*   PHOS 2.9   ALB 1.7*     Recent Labs     10/28/21  0330   PH 7.52*   PCO2 36   PO2 123*   HCO3 30*   FIO2 28.0       24 Hour Results:  Recent Results (from the past 24 hour(s))   GLUCOSE, POC    Collection Time: 10/29/21 10:57 AM   Result Value Ref Range    Glucose (POC) 88 65 - 117 mg/dL    Performed by [address] [place] [address], POC    Collection Time: 10/29/21  5:54 PM   Result Value Ref Range    Glucose (POC) 106 65 - 117 mg/dL    Performed by Trae Wooten        CT CHEST WO CONT   Final Result   Continued dense and patchy multifocal pneumonia and pneumonitis,   left greater than right with extensive subcutaneous emphysema and   pneumomediastinum      XR CHEST PORT   Final Result   Right perihilar infiltrative changes. Residual pneumomediastinum and   right subcutaneous emphysematous changes. XR CHEST PORT   Final Result      1. Similar appearance of lucencies paralleling the mediastinum that most likely   represent pneumomediastinum.  Small anterior pneumothorax on the left is   difficult to completely exclude but overall appearance is similar to prior. Recommend continued attention on follow-up. 2. Pulmonary opacities appearing similar to prior. XR CHEST PORT   Final Result   FINDINGS: IMPRESSION: Single frontal view of the chest.      ET tube distal tip 6.7 cm above shahid. Normal heart size. Similar pneumomediastinum. The lungs are well inflated. Right perihilar bronchial thickening and opacity   unchanged. Left hand obscures the left lung. No sizable pleural effusion, lobar   consolidation, or evident pneumothorax. No free air under the diaphragm. PEG tube balloon projects over gastric air   bubble. Previous dense material in the expected location of esophagus has   resolved. Unchanged right greater than left subcutaneous emphysema. XR CHEST PORT   Final Result   FINDINGS: IMPRESSION: Single frontal view of the chest.      ET tube distal tip 6.5 cm above shahid. Normal heart size. Pneumomediastinum and subcutaneous emphysema has developed   since the prior study. No jacey evidence of pneumothorax. Called report to   patient's nurse [facility] at 5:15 AM 10/27/2021. Left hand obscures the left hemithorax. Right parahilar bronchial thickening and   opacity unchanged. No lobar consolidation, sizable pleural effusion. Dense   material projects over the medial left lower hemithorax possibly tube feed   reflux in the esophagus. PEG tube balloon projects over the stomach. XR ABD PORT  1 V   Final Result      IR INSERT NON TUNL CVC OVER 5 YRS   Final Result   Successful placement of a triple-lumen central venous catheter. The catheter is   ready for use. IR US GUIDED VASCULAR ACCESS   Final Result   Successful placement of a triple-lumen central venous catheter. The catheter is   ready for use.       XR ABD (KUB)   Final Result      XR CHEST PORT   Final Result   Endotracheal tube in satisfactory position. Otherwise stable exam.                XR CHEST PORT   Final Result   Elevation of left hemidiaphragm with atelectasis in left base. Increased density in the perihilar regions which may represent atelectasis   and/or developing infiltrate. Aspiration could give this appearance. .                XR CHEST PORT   Final Result   Pulmonary hypoinflation. Unchanged cavitary lesion left lower lobe. XR CHEST PORT   Final Result      XR CHEST PORT   Final Result      XR CHEST PORT   Final Result   Feeding tube as above. No significant interval change. XR CHEST PORT   Final Result   FINDINGS: IMPRESSION: Single frontal view of the abdomen. Enteric tube passes below the left hemidiaphragm, coiled in the gastric fundus   region. Normal heart size. Increased hypoinflation and bilateral pulmonary airspace pneumonia and/or   atelectasis. Cavitary lesion left lower lobe as previous. No large pleural   effusion or pneumothorax. No free air under the diaphragm. XR CHEST PORT   Final Result      XR ABD (KUB)   Final Result      XR ABD PORT  1 V   Final Result      XR CHEST PORT   Final Result   FINDINGS: IMPRESSION: Single frontal view of the chest. Patient's left hand   obscures the left lung/hemithorax. Enteric tube distal tip below left hemidiaphragm. Normal heart size. Similar right perihilar and upper lobe airspace disease. No sizable pleural   effusion or pneumothorax. XR CHEST PORT   Final Result   Slightly retracted enteric tube, otherwise unchanged. XR CHEST PORT   Final Result   1. Enteric tube as above. 2. Persistent multifocal airspace disease with a cavitary lesion in the left   lateral lung. CTA CHEST W OR W WO CONT   Final Result   Negative for pulmonary embolus. Findings the lungs consistent with multilobar pneumonia with concomitant   bronchiolitis.  The cavitary foci may represent pneumatoceles or potentially   septic emboli, echocardiogram recommended when clinically able. XR CHEST PORT   Final Result   1. Large right perihilar opacity, mass versus airspace disease. Further   characterization with CT is recommended. 2. Nodular opacity in the left midlung zone. Further characterization with CT is   recommended. XR NECK SOFT TISSUE   Final Result   No significant soft tissue swelling or radiodense foreign body. Assessment:  Multifocal bilateral pneumonia with cavitary lesion left lower lobe   -On Zosyn day #13    Acute respiratory failure with hypoxia, now requiring mechanical ventilation. Patient intubated 10/26   -Extubated 10/29    Pneumomediastinum, appears stable    Hypernatremia due to dehydration, improved    Severe sepsis with septic shock with tachycardia and elevated lactate. Present on admission    -Off pressors. WBC now normal    Hypokalemia. Repleted    Possible complicated UTI. Urine culture was negative    Hereditary spinocerebellar ataxia with chronic debilitation    Adult failure to thrive    Severe protein calorie malnutrition. Status post PEG tube this admission        Plan:    Continue IV Zosyn  Continue tube feeds and supportive care    Transfer to floor        Disposition: Continued inpatient care    Total time spent with patient: 30 minutes.     Heather Miramontes MD

## 2021-10-31 NOTE — PROGRESS NOTES
Hospitalist Progress Note Subjective:  
Daily Progress Note: 10/31/2021 9:27 AM 
 
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 Patient is nonverbal.  In the ED patient's initial oxygen saturations were in the 80s. Rapid Covid on 10/17/2021 -. She was placed on a nonrebreather. CTA of the chest showed multifocal airspace disease and a small cavity lesion in the left lower lobe. She was admitted to the ICU and started on IV Zosyn. Pulmonology consulted. Labs are significant for a sodium of 164. Patient was slowly weaned to room air but on the evening of 10/19/2020 when she desatted again and transferred to the ICU. She was intubated on 10/26/2021. She was placed on pressors for severe sepsis with septic shock that is associated with elevated lactic acid and tachycardia. She was extubated on 10/29/2021 and weaned off of pressors. .  NG tube was placed to start tube feedings and patient is status post PEG tube placement. She is tolerating her tube feeds well. She was transferred out of the ICU on 10/30/2021. Patient's oxygen saturation is continuingly to wean and currently is on 2 L nasal cannula. She has had 14 days of IV Zosyn. Repeat chest x-ray pending. We will stop IV antibiotics at this time. She is been afebrile. KUB shows large amount of stool. On stool softeners. Will give an enema Subjective: Patient is noncommunicative orally. Appears to be resting comfortably. Current Facility-Administered Medications Medication Dose Route Frequency  glycopyrrolate (ROBINUL) tablet 0.5 mg  0.5 mg Per G Tube Q8H  
 zinc oxide-white petrolatum 17-57 % topical paste   Topical TID  albuterol-ipratropium (DUO-NEB) 2.5 MG-0.5 MG/3 ML  3 mL Nebulization Q6HWA RT  
 albuterol-ipratropium (DUO-NEB) 2.5 MG-0.5 MG/3 ML  3 mL Nebulization Q6H PRN  
 sorbitoL 70 % solution 30 mL  30 mL Per G Tube DAILY  docusate (COLACE) 50 mg/5 mL oral liquid 100 mg  100 mg Per G Tube BID  potassium chloride (KLOR-CON) packet for solution 20 mEq  20 mEq Per NG tube TID WITH MEALS  
 [Held by provider] mirtazapine (REMERON) tablet 15 mg  15 mg Oral QHS  sodium chloride (NS) flush 5-40 mL  5-40 mL IntraVENous Q8H  
 sodium chloride (NS) flush 5-40 mL  5-40 mL IntraVENous PRN  
 acetaminophen (TYLENOL) tablet 650 mg  650 mg Oral Q6H PRN Or  
 acetaminophen (TYLENOL) suppository 650 mg  650 mg Rectal Q6H PRN  polyethylene glycol (MIRALAX) packet 17 g  17 g Oral DAILY PRN  
 ondansetron (ZOFRAN ODT) tablet 4 mg  4 mg Oral Q8H PRN Or  
 ondansetron (ZOFRAN) injection 4 mg  4 mg IntraVENous Q6H PRN  
 enoxaparin (LOVENOX) injection 40 mg  40 mg SubCUTAneous DAILY  piperacillin-tazobactam (ZOSYN) 3.375 g in 0.9% sodium chloride (MBP/ADV) 100 mL MBP  3.375 g IntraVENous Q8H Review of Systems Unable to assess at this time Objective:  
 
Visit Vitals BP 97/62 Pulse 92 Temp 98.4 °F (36.9 °C) Resp 20 Ht 5' 6\" (1.676 m) Wt 34 kg (74 lb 15.3 oz) LMP  (LMP Unknown) SpO2 97% BMI 12.10 kg/m² O2 Flow Rate (L/min): 2 l/min O2 Device: Nasal cannula Temp (24hrs), Av.5 °F (36.9 °C), Min:98.4 °F (36.9 °C), Max:98.6 °F (37 °C) No intake/output data recorded. 10/29 1901 - 10/31 0700 In: 56 Out: - PHYSICAL EXAM: 
Constitutional: No acute distress Skin: Extremities and face reveal no rashes. HEENT: Sclerae anicteric. Extra-occular muscles are intact. No oral ulcers. The neck is supple and no masses. Cardiovascular: Regular rate and rhythm. Respiratory:  diminished GI: Abdomen nondistended, soft, and nontender. Normal active bowel sounds. Musculoskeletal: Contracted Neurological:  Lethargic. Data Review No results found for this or any previous visit (from the past 24 hour(s)). CBC:  
Lab Results Component Value Date/Time WBC 10.0 10/29/2021 05:39 AM  
 RBC 3.55 (L) 10/29/2021 05:39 AM  
 HGB 10.0 (L) 10/29/2021 05:39 AM  
 HCT 31.0 (L) 10/29/2021 05:39 AM  
 PLATELET 064 63/27/2799 05:39 AM  
 
BMP:  
Lab Results Component Value Date/Time Glucose 96 10/29/2021 05:39 AM  
 Sodium 142 10/29/2021 05:39 AM  
 Potassium 3.9 10/29/2021 05:39 AM  
 Chloride 109 (H) 10/29/2021 05:39 AM  
 CO2 30 10/29/2021 05:39 AM  
 BUN 9 10/29/2021 05:39 AM  
 Creatinine <0.15 (L) 10/29/2021 05:39 AM  
 Calcium 8.0 (L) 10/29/2021 05:39 AM  
 
Radiology review: CT scans Assessment:  
1. Multifocal bilateral pneumonia/severe sepsis with septic shock 2. Acute hypoxic respiratory failure status post extubation on 10/29/2021 3. Pneumomediastinum 4. Hypernatremia due to dehydration 5. Adult failure to thrive with severe protein calorie malnutrition status post PEG tube placement 6. Hereditary spinocerebellar ataxia with chronic debilitated 7. Possible complicated UTI with negative urine culture Plan: 1. Patient is on day #14 of Zosyn. We will stop. Repeat chest x-ray is pending. She is been afebrile. WBC slightly elevated. Procalcitonin 0.84. Patient is currently off of pressors. Blood pressure is stable. 2.  Patient is status post extubation. Repeat chest x-ray pending. Pulmonology consulted. Patient oxygen saturation is 97% on room air. 3.  CT scan showed pneumomediastinum status post emphysema and bilateral infiltrates. Repeat chest x-ray pending 4. Electrolytes are stable. BUN 9 with a serum creatinine of 0.14. 
5.  Patient is status post PEG tube placement. Tolerating tube feedings. 6.  Chronically bedridden. 7.  Patient has had 14 days of IV Zosyn. Been afebrile. Urine culture negative. 8.  CBC, BMP, procalcitonin in a.m. Dispo: Suspect in 24 to 48 hours pending clinical progression. Most likely will need placement CODE STATUS full code DVT prophylaxis: Lovenox Ulcer prophylaxis: Protonix Care Plan discussed with: Patient/Family, Nurse and  Total time spent with patient: 34 minutes.

## 2021-10-31 NOTE — PROGRESS NOTES
Spiritual Care Assessment/Progress Note [facility] 
 
 
NAME: Lary Estrada      MRN: [id number] AGE: 39 y.o. SEX: female Congregational Affiliation: Unknown Language: English  
 
10/31/2021     Total Time (in minutes): 15 Spiritual Assessment begun in [address] through conversation with: 
  
    [x]Patient        [] Family    [] Friend(s) Reason for Consult: Initial/Spiritual assessment, patient floor Spiritual beliefs: (Please include comment if needed) 
   [] Identifies with a rajwinder tradition:     
   [] Supported by a rajwinder community:        
   [] Claims no spiritual orientation:       
   [] Seeking spiritual identity:            
   [] Adheres to an individual form of spirituality:       
   [x] Not able to assess:                   
 
    
Identified resources for coping:  
   [] Prayer                           
   [] Music                  [] Guided Imagery 
   [] Family/friends                 [] Pet visits [] Devotional reading                         [x] Unknown 
   [] Other:                                          
 
 
Interventions offered during this visit: (See comments for more details) Plan of Care: 
 
 [] Support spiritual and/or cultural needs  
 [] Support AMD and/or advance care planning process    
 [] Support grieving process 
 [] Coordinate Rites and/or Rituals  
 [] Coordination with community clergy [] No spiritual needs identified at this time 
 [] Detailed Plan of Care below (See Comments)  [] Make referral to Music Therapy 
[] Make referral to Pet Therapy    
[] Make referral to Addiction services 
[] Make referral to [facility] 
[] Make referral to Spiritual Care Partner 
[] No future visits requested       
[x] Follow up upon further referrals Comments: The purpose of the visit was to do a spiritual assessment on the patient. The patient was unable to share during the visit.  The  provided the ministry of presence and the comfort of spiritual support. [facility] Patti Herrera.  can be reached by calling the  at [facility] 
[phone]

## 2021-10-31 NOTE — PROGRESS NOTES
Pulmonary, Critical Care Note Name: Melissa Lara MRN: [id number] : 1985 Hospital: [address] [place] [address] Date: 10/31/2021  Admission date: 10/17/2021 Hospital Day: 15 Subjective/Interval History:  
Patient seen on the medical floor. Has best I can understand she is bedbound nonverbal but does eat orally. In any event she has had gurgling respirations were brought to the emergency room has bilateral pneumonia right base greater than left base with a cavitary area in the left lower lung pleural base. She is normally followed at [facility] 
10/19 now in the ICU on room air. Apparently had worsening respiratory status last evening and had desaturation was transferred to the ICU has had frequent oral suctioning and is now back on room air 10/20 NG tube in place currently on room air saturation 98% looks at voice otherwise unresponsive 10/26 events noted patient was transferred to ICU now intubated on the ventilator she was put on 100% nonrebreather mask and then to Ventimask but she did not tolerate subsequently got intubated she was hemodynamically unstable on pressors now 10/30 now on nasal oxygen 5 L saturation 100% looks at voice does not speak or follow commands 10/31 on nasal oxygen 2 L well preserved oxygen saturation but worsening status. Chest x-ray shows worsening infiltrate left mid and upper lung with volume loss more noticeable gurgling respirations and diminished breath sounds over the left chest 
 
Hospital Problems  Never Reviewed Codes Class Noted POA Severe protein-calorie malnutrition ([facility] ICD-10-CM: S00 ICD-9-CM: 028  10/18/2021 Unknown Acute respiratory failure ([facility] ICD-10-CM: J96.00 
ICD-9-CM: 518.81  10/17/2021 Unknown Failure to thrive in adult ICD-10-CM: R62.7 ICD-9-CM: 783.7  10/17/2021 Unknown Multifocal pneumonia ICD-10-CM: J18.9 ICD-9-CM: 837  10/17/2021 Unknown IMPRESSION:  
1.  Acute hypoxic respiratory failure controlled on nasal oxygen 2. Aspiration pneumonia worsening infiltrate left midlung may be retained secretions 3. Hypothermia resolved 4. Pneumomediastinum extensive subcutaneous emphysema persists 5. Subcu emphysema 6. Hypovolemic shock now off pressors 7. Pharyngeal dysphagia probable continued aspiration 8. Gastroesophageal reflux disease with gastroparesis 9. Fecal impaction with ileus we will repeat KUB in a.m. 
10. Ileus has bowel sounds 11. Severe malnutrition Body mass index is 12.1 kg/m². RECOMMENDATIONS/PLAN:  
1. Extubated on 10/28 nasal oxygen now at 2 L 
2. Worsening left midlung infiltrate and volume loss may be worsening mucous plugging we will have to discontinue Robinul add Mucomyst and change nebulizer to IPPB 3. last CAT scan of the chest shows pneumomediastinum with emphysema and bilateral infiltrate 4. Off vasopressors 5. Continue tube feedings per PEG we will add Reglan in case she is having reflux with aspiration 6. Remains on Zosyn  
 
 
  
[x] High complexity decision making was performed [x] See my orders for details Subjective/Initial History:  
 
I was asked by Oral Lofton MD to see Carmen Haitian  a 39 y.o.  female in consultation for a chief complaint of acute hypoxic respiratory failure aspiration pneumonia with cavitation No Known Allergies MAR reviewed and pertinent medications noted or modified as needed Current Facility-Administered Medications Medication  [START ON 11/1/2021] pantoprazole (PROTONIX) tablet 40 mg  
 senna (SENOKOT) tablet 17.2 mg  
 albuterol-ipratropium (DUO-NEB) 2.5 MG-0.5 MG/3 ML  
 acetylcysteine (MUCOMYST) 200 mg/mL (20 %) solution 600 mg  budesonide (PULMICORT) 500 mcg/2 ml nebulizer suspension  zinc oxide-white petrolatum 17-57 % topical paste  albuterol-ipratropium (DUO-NEB) 2.5 MG-0.5 MG/3 ML  sorbitoL 70 % solution 30 mL  docusate (COLACE) 50 mg/5 mL oral liquid 100 mg  potassium chloride (KLOR-CON) packet for solution 20 mEq  [Held by provider] mirtazapine (REMERON) tablet 15 mg  
 sodium chloride (NS) flush 5-40 mL  sodium chloride (NS) flush 5-40 mL  acetaminophen (TYLENOL) tablet 650 mg Or  acetaminophen (TYLENOL) suppository 650 mg  
 polyethylene glycol (MIRALAX) packet 17 g  
 ondansetron (ZOFRAN ODT) tablet 4 mg Or  
 ondansetron (ZOFRAN) injection 4 mg  enoxaparin (LOVENOX) injection 40 mg Patient PCP: Unknown, Provider, MD 
PMH:  has a past medical history of Cerebellar ataxia ([address] PSH:   has a past surgical history that includes pr breast surgery procedure unlisted and ir insert non tunl cvc over 5 yrs (10/26/2021). FHX: family history is not on file. SHX:  reports that she has never smoked. She has never used smokeless tobacco. She reports that she does not drink alcohol and does not use drugs. Systemic review unobtainable as the patient is nonverbal 
 
 
Objective:  
 
Vital Signs: Telemetry:    normal sinus rhythm Intake/Output:  
Visit Vitals BP 97/62 Pulse 92 Temp 98.4 °F (36.9 °C) Resp 20 Ht 5' 6\" (1.676 m) Wt 34 kg (74 lb 15.3 oz) LMP  (LMP Unknown) SpO2 97% BMI 12.10 kg/m² Temp (24hrs), Av.5 °F (36.9 °C), Min:98.4 °F (36.9 °C), Max:98.6 °F (37 °C) O2 Device: Nasal cannula O2 Flow Rate (L/min): 2 l/min Wt Readings from Last 4 Encounters:  
10/21/21 34 kg (74 lb 15.3 oz) No intake or output data in the 24 hours ending 10/31/21 1414 Last shift:      No intake/output data recorded. Last 3 shifts: 10/29 1901 - 10/31 0700 In: 56 Out: - Physical Exam:  
General:  female; eyes are open looks at voice follows no commands HEENT: NCAT, oral mucosa clear poor dentition Eyes: anicteric; conjunctiva clear tracks with eyes Neck: no nodes, no JVD, no accessory MM use has subcutaneous emphysema upper airway gurgling noise Chest: no deformity, has subcutaneous emphysema Cardiac: Regular rate and rhythm Lungs: Clear anteriorly and right lateral with very diminished breath sounds left lateral no breath sounds left base Abd: Thin emaciated bowel sounds present Ext: no edema; no joint swelling; No clubbing : clear urine Neuro: Awake tracks with her eyes does not speak does not follow commands Psych-nonverbal unable to assess Skin: warm, dry, no cyanosis;  
Pulses: Brachial radial femoral pulses intact Capillary: Normal capillary refill Neuro contracted upper extremities Labs: 
 
Recent Labs 10/31/21 
1024 10/29/21 
0539 WBC 18.9* 10.0 HGB 9.3* 10.0*  
 192 Recent Labs 10/31/21 
1024 10/29/21 
0539  142  
K 4.1 3.9  109* CO2 28 30 * 96 BUN 9 9  
CREA <0.15* <0.15* CA 7.9* 8.0*  
PHOS 2.6 2.9 ALB 1.6* 1.7*  
10/31 2 L nasal oxygen with saturation 97% 10/30 on 3 L oxygen saturation 98% 10/20 on 2 L nasal oxygen PO2 115 PCO2 40 pH 7.42 Nasal MRSA smear negative Blood 1 of 4 bottles with gram-positive cocci Urine culture no growth COVID-19 antigen negative Procalcitonin 2.21 Lab Results Component Value Date/Time Culture result: No significant growth, <10,000 CFU/mL 10/18/2021 11:15 AM  
 Culture result: (A) 10/17/2021 05:30 PM  
  Gram Positive Cocci CALLED TO AND READ BACK BY HOME SPICER R.N. 1030 10/19/2021 BY DPW Culture result:  10/17/2021 05:30 PM  
  Staphylococcus species, coagulase negative growing in 1 of 4 bottles drawn NO SITE Imaging: CXR Results  (Last 48 hours) 10/31 chest x-ray elevated left hemidiaphragm with volume loss increasing left midlung infiltrate 10/19/21 0333  XR CHEST PORT Final result Impression:  FINDINGS: IMPRESSION: Single frontal view of the chest. Patient's left hand  
obscures the left lung/hemithorax. Enteric tube distal tip below left hemidiaphragm. Normal heart size.   
   
Similar right perihilar and upper lobe airspace disease. No sizable pleural  
effusion or pneumothorax. To me I agree there is right upper lobe infiltrate there is also left basilar infiltrate with obscuration of the hemidiaphragm Narrative:  Aspiration pneumonia. Comparison chest x-ray 10/18/2021.  
   
  
 10/18/21 2115  XR CHEST PORT Final result Impression:  Slightly retracted enteric tube, otherwise unchanged. Narrative:  AP portable chest, 2108 hours. Comparison: Earlier the same day at 2009 hours. Findings: The enteric tube has been retracted slightly. The tip is in the region  
of the gastric fundus. The sidehole remains below the diaphragm. Cardiac  
monitoring leads overlie the chest.  
   
The heart is normal in size. Multifocal airspace disease and a left-sided  
cavitary lesion are again noted. There is no pleural effusion or pneumothorax. There is gaseous distention of the colon. 10/18/21 2017  XR CHEST PORT Final result Impression:  1. Enteric tube as above. 2. Persistent multifocal airspace disease with a cavitary lesion in the left  
lateral lung. Narrative:  AP portable chest, 2009 hours. Comparison: 10/17/2021. Findings: There is an enteric tube in place which coils overlying the gastric  
fundus with the tip projected back towards the gastroesophageal junction. Cardiac monitoring leads overlie the chest. The heart is normal in size. There  
is persistent dense consolidation in the right perihilar region. There is a  
cavitary lesion laterally in the left midlung zone. No pleural effusion or  
pneumothorax is identified. The osseous structures are unremarkable. There is  
gaseous distention of the bowel. 10/17/21 1512  XR CHEST PORT Final result Impression:  1. Large right perihilar opacity, mass versus airspace disease. Further  
characterization with CT is recommended. 2. Nodular opacity in the left midlung zone.  Further characterization with CT is  
recommended. Narrative:  AP portable chest, 1500 hours. Comparison: None. Findings: Multiple cardiac monitoring leads overlie the chest. The heart is  
normal in size. There is an 8.4 cm right perihilar opacity. There is a 3.7 cm  
opacity in the left midlung zone. There is no pulmonary vascular congestion. No  
pleural effusion or pneumothorax is identified. The osseous structures are  
unremarkable. Results from Hospital Encounter encounter on 10/17/21 XR ABD (KUB) Narrative Examination: XR ABD (KUB) History: Fecal impaction Comparison: Abdominal radiographs 10/26/2021 FINDINGS: 
 
Frontal views of the abdomen. There is a large amount stool within the rectum. Otherwise there is a nonobstructive bowel gas pattern. Probable gastrostomy tube 
overlies the central abdomen. Left femoral catheter projects over the pelvis. Osseous structures appear unchanged. Please see separate dictations for 
concurrently performed chest radiograph or more detailed evaluation of the 
intrathoracic contents. Impression Large amount stool within the rectum. XR CHEST PORT Narrative Examination: XR CHEST PORT History: Aspiration pneumonia pneumomediastinum Comparison: Chest radiograph 10/28/2021, chest CT 10/28/2021 FINDINGS: 
 
Single frontal portable view of the chest. Removal of endotracheal tube. Elevation of the left hemidiaphragm with somewhat progressed left basilar 
opacities. The opacities in the right perihilar region appear similar to prior. Layering pleural effusion on the left could contribute to the basilar 
predominant opacities. No radiographically evident pneumothorax. Pneumomediastinum seen on the recently performed CT is not well seen 
radiographically. Decreased soft tissue gas in the neck and chest. Cardiac 
silhouette appears normal in size. Mediastinal contours are grossly unchanged 
given differences in patient positioning. Osseous structures appear grossly 
unchanged. Impression 1. Elevation of the left hemidiaphragm with slightly progressed opacities on the 
left. Right-sided opacities appear similar to prior. 2. Removal of endotracheal tube. XR CHEST PORT Narrative Portable chest: 
 
History:CHF 
 
COMPARISON: Portable chest 10/27/2021 The left forearm is superimposed with the left lung. Minimal hazy infiltration 
is seen in the region of the right hilum. No consolidation or pleural fluid. Heart is normal size. There is residual pneumomediastinum with subcutaneous 
emphysematous change is in the region of the right shoulder and neck. Endotracheal tube with the tip 3.5 cm above the shahid. Moderate amount of 
intestinal gas. Impression Right perihilar infiltrative changes. Residual pneumomediastinum and 
right subcutaneous emphysematous changes. Results from Hospital Encounter encounter on 10/17/21 CT CHEST WO CONT Narrative CT dose reduction was achieved through use of a standardized protocol tailored 
for this examination and automatic exposure control for dose modulation. Noncontrast study shows dense and patchy consolidation in both lower lobes than 
mild patchy ground glass and micronodular infiltrates in the left upper lobe, 
and also mainly in the adjacent portions of the lower lungs, to the more dense 
pneumonia. Microcystic change in the right lower lobe component without dominant 
cavity. There is moderate pneumomediastinum, mainly anterior, contiguous with extensive 
subcutaneous emphysema extending up into the lower neck and mostly around the 
right chest. The CT of October 17 is not available for direct comparison. No mediastinal or hilar adenopathy. Normal caliber aorta. Small left pleural 
effusion. No significant upper abdominal finding Impression Continued dense and patchy multifocal pneumonia and pneumonitis, 
left greater than right with extensive subcutaneous emphysema and 
pneumomediastinum Discussion patient has gurgling respirations has pharyngeal dysphagia is chronically aspirating. Initial x-ray was suspicious for gastric distention as well very likely has gastroparesis with reflux and aspiration on that. Chest x-ray and CT show bibasilar infiltrates right greater than left consistent with aspiration and there is a cavitary area in the left lung which suggest this is a chronic process. She is on Zosyn I agree with that. Agree n.p.o. status with feeding tube. She likely needs a PEG tube 10/19 now in the ICU due to respiratory distress has a lot of upper airway noise with inability to control oral secretions. Nurses are requesting Robinul however abdominal x-ray is suggestive of ileus with fecal impaction. Robinul could possibly make this worse. Despite this we will place on small dose Robinul. We will give enema start Colace per tube. Nasal MRSA smear and blood culture are negative we will discontinue vancomycin. Potassium to be repleted IV. Sodium remains highly elevated but is minimally improved. 10/20 respirations nonlabored today upper airway noise absent. Will decrease dose Robinul as I am worried about ileus. Abdominal x-ray still suggest fecal impaction. She received an enema yesterday we will repeat today and add sorbitol per G-tube. Would continue NG feedings 10/21 chest x-ray shows bilateral infiltrate with cavitary lesion NG tube is in the gastric fundal region 10/22 condition stable this morning saturation 100% on room air 10/26 seconds of movements noted patient intubated on ventilator 100% FiO2 will change the vent settings abdomen X ray chest x-ray did not shows much different from before still possibility of aspiration 10/28 remains on ventilator we will switch her to spontaneous 10/29 extubated now on nasal cannula 10/30 nasal oxygen up to 5 L have decreased back to 3 L.   Assumed continued aspiration oral secretions will increase Robinul to 3 times daily. Will repeat KUB in a.m. if still evidence of fecal impaction we will add Reglan 10/31 oxygen saturation well-maintained but has markedly diminished breath sounds over the left lateral and lower chest with increase infiltrate. May be having worsening mucous plugging. Will discontinue Robinul add Mucomyst per PPB Bryant Kimbrough MD

## 2021-10-31 NOTE — PROGRESS NOTES
Resident is currently bedbound and monitored for respiratory distress. She has been suctioned, administered with nebulizer treatment PRN for hypoxia.   Repositioned q2h,

## 2021-10-31 NOTE — PROGRESS NOTES
Received report from [facility], Count includes the [facility].

## 2021-11-01 NOTE — PROGRESS NOTES
Pulmonary, Critical Care Note Name: Flor Lara MRN: [id number] : 1985 Hospital: [address] [place] [address] Date: 2021  Admission date: 10/17/2021 Hospital Day: 12 Subjective/Interval History:  
Patient seen on the medical floor. Has best I can understand she is bedbound nonverbal but does eat orally. In any event she has had gurgling respirations were brought to the emergency room has bilateral pneumonia right base greater than left base with a cavitary area in the left lower lung pleural base. She is normally followed at [facility] 
10/19 now in the ICU on room air. Apparently had worsening respiratory status last evening and had desaturation was transferred to the ICU has had frequent oral suctioning and is now back on room air 10/20 NG tube in place currently on room air saturation 98% looks at voice otherwise unresponsive 10/26 events noted patient was transferred to ICU now intubated on the ventilator she was put on 100% nonrebreather mask and then to Ventimask but she did not tolerate subsequently got intubated she was hemodynamically unstable on pressors now 10/30 now on nasal oxygen 5 L saturation 100% looks at voice does not speak or follow commands 10/31 on nasal oxygen 2 L well preserved oxygen saturation but worsening status. Chest x-ray shows worsening infiltrate left mid and upper lung with volume loss more noticeable gurgling respirations and diminished breath sounds over the left chest 
 oxygen saturation well-maintained on 2 L. Exam is improved no upper airway gurgling noise and much better breath sounds on the left Hospital Problems  Never Reviewed Codes Class Noted POA Severe protein-calorie malnutrition ([facility] [address] ICD-10-CM: B52 ICD-9-CM: 734  10/18/2021 Unknown Acute respiratory failure ([facility] [address] ICD-10-CM: J96.00 
ICD-9-CM: 518.81  10/17/2021 Unknown Failure to thrive in adult ICD-10-CM: R62.7 ICD-9-CM: 783.7  10/17/2021 Unknown Multifocal pneumonia ICD-10-CM: J18.9 ICD-9-CM: 585  10/17/2021 Unknown IMPRESSION:  
1. Acute hypoxic respiratory failure controlled on nasal oxygen 2 L 
2. Aspiration pneumonia worsening infiltrate left midlung may be retained secretions improved on x-ray 11/1 3. Hypothermia resolved 4. Pneumomediastinum extensive subcutaneous emphysema persists 5. Subcu emphysema 6. Hypovolemic shock now off pressors 7. Pharyngeal dysphagia probable continued aspiration 8. Gastroesophageal reflux disease with gastroparesis Reglan started 9. Fecal impaction with ileus 10. Ileus has bowel sounds 11. Severe malnutrition Body mass index is 12.1 kg/m². RECOMMENDATIONS/PLAN:  
1. Extubated on 10/28 nasal oxygen now at 2 L 
2. 10/31 worsening left midlung infiltrate and volume loss probable mucous plugging improved on 11/1 x-ray Mucomyst started 10/31 as well as Reglan 3. last CAT scan of the chest shows pneumomediastinum with emphysema and bilateral infiltrate 4. Off vasopressors 5. Continue tube feedings per PEG have added Reglan in case she is having reflux with aspiration 6. Remains on Zosyn  
 
 
  
[x] High complexity decision making was performed [x] See my orders for details Subjective/Initial History:  
 
I was asked by Dennis Molina MD to see Elke Clementlles  a 39 y.o.  female in consultation for a chief complaint of acute hypoxic respiratory failure aspiration pneumonia with cavitation No Known Allergies MAR reviewed and pertinent medications noted or modified as needed Current Facility-Administered Medications Medication  piperacillin-tazobactam (ZOSYN) 3.375 g in 0.9% sodium chloride (MBP/ADV) 100 mL MBP  pantoprazole (PROTONIX) tablet 40 mg  
 senna (SENOKOT) tablet 17.2 mg  
 albuterol-ipratropium (DUO-NEB) 2.5 MG-0.5 MG/3 ML  
 acetylcysteine (MUCOMYST) 200 mg/mL (20 %) solution 600 mg  budesonide (PULMICORT) 500 mcg/2 ml nebulizer suspension  metoclopramide (REGLAN) 5 mg/5 mL oral syrup 5 mg  zinc oxide-white petrolatum 17-57 % topical paste  albuterol-ipratropium (DUO-NEB) 2.5 MG-0.5 MG/3 ML  sorbitoL 70 % solution 30 mL  docusate (COLACE) 50 mg/5 mL oral liquid 100 mg  potassium chloride (KLOR-CON) packet for solution 20 mEq  [Held by provider] mirtazapine (REMERON) tablet 15 mg  
 sodium chloride (NS) flush 5-40 mL  sodium chloride (NS) flush 5-40 mL  acetaminophen (TYLENOL) tablet 650 mg Or  acetaminophen (TYLENOL) suppository 650 mg  
 polyethylene glycol (MIRALAX) packet 17 g  
 ondansetron (ZOFRAN ODT) tablet 4 mg Or  
 ondansetron (ZOFRAN) injection 4 mg  enoxaparin (LOVENOX) injection 40 mg Patient PCP: Unknown, Provider, MD 
PMH:  has a past medical history of Cerebellar ataxia ([facility] [address] PSH:   has a past surgical history that includes pr breast surgery procedure unlisted and ir insert non tunl cvc over 5 yrs (10/26/2021). FHX: family history is not on file. SHX:  reports that she has never smoked. She has never used smokeless tobacco. She reports that she does not drink alcohol and does not use drugs. Systemic review unobtainable as the patient is nonverbal 
 
 
Objective:  
 
Vital Signs: Telemetry:    normal sinus rhythm Intake/Output:  
Visit Vitals /78 (BP 1 Location: Left upper arm, BP Patient Position: At rest) Pulse (!) 113 Temp 98.2 °F (36.8 °C) Resp 18 Ht 5' 6\" (1.676 m) Wt 34 kg (74 lb 15.3 oz) LMP  (LMP Unknown) SpO2 94% BMI 12.10 kg/m² Temp (24hrs), Av.3 °F (36.8 °C), Min:97.6 °F (36.4 °C), Max:99 °F (37.2 °C) O2 Device: Nasal cannula O2 Flow Rate (L/min): 2 l/min Wt Readings from Last 4 Encounters:  
10/21/21 34 kg (74 lb 15.3 oz) No intake or output data in the 24 hours ending 21 1026 Last shift:      No intake/output data recorded. Last 3 shifts: No intake/output data recorded.   
 
 
Physical Exam:  
General:  female; eyes are open looks at voice follows no commands HEENT: NCAT, oral mucosa clear poor dentition Eyes: anicteric; conjunctiva clear tracks with eyes Neck: no nodes, no JVD, no accessory MM use has subcutaneous emphysema upper airway gurgling noise Chest: no deformity, has subcutaneous emphysema Cardiac: Regular rate and rhythm Lungs: Clear anteriorly and laterally both sides much better breath sounds over the left chest 
Abd: Thin emaciated bowel sounds present Ext: no edema; no joint swelling; No clubbing : clear urine Neuro: Awake tracks with her eyes does not speak does not follow commands Psych-nonverbal unable to assess Skin: warm, dry, no cyanosis;  
Pulses: Brachial radial femoral pulses intact Capillary: Normal capillary refill Neuro contracted upper extremities Labs: 
 
Recent Labs 11/01/21 
0940 10/31/21 
1024 WBC 24.4* 18.9* HGB 9.8* 9.3*  
 184 Recent Labs 10/31/21 
1024   
K 4.1  CO2 28 * BUN 9  
CREA <0.15* CA 7.9*  
PHOS 2.6 ALB 1.6*  
11/1 2 L nasal oxygen with saturation 95% 10/30 on 3 L oxygen saturation 98% 10/20 on 2 L nasal oxygen PO2 115 PCO2 40 pH 7.42 Nasal MRSA smear negative Blood 1 of 4 bottles with gram-positive cocci Urine culture no growth COVID-19 antigen negative Procalcitonin 2.21 Lab Results Component Value Date/Time Culture result: No significant growth, <10,000 CFU/mL 10/18/2021 11:15 AM  
 Culture result: (A) 10/17/2021 05:30 PM  
  Gram Positive Cocci CALLED TO AND READ BACK BY HOME SPICER R.N. 1030 10/19/2021 BY DPW Culture result:  10/17/2021 05:30 PM  
  Staphylococcus species, coagulase negative growing in 1 of 4 bottles drawn NO SITE Imaging: CXR Results  (Last 48 hours) 
11/1 chest x-ray with better expansion and aeration left lung 10/31 chest x-ray elevated left hemidiaphragm with volume loss increasing left midlung infiltrate 10/19/21 0333  XR CHEST PORT Final result Impression:  FINDINGS: IMPRESSION: Single frontal view of the chest. Patient's left hand  
obscures the left lung/hemithorax. Enteric tube distal tip below left hemidiaphragm. Normal heart size. Similar right perihilar and upper lobe airspace disease. No sizable pleural  
effusion or pneumothorax. To me I agree there is right upper lobe infiltrate there is also left basilar infiltrate with obscuration of the hemidiaphragm Narrative:  Aspiration pneumonia. Comparison chest x-ray 10/18/2021.  
   
  
 10/18/21 2115  XR CHEST PORT Final result Impression:  Slightly retracted enteric tube, otherwise unchanged. Narrative:  AP portable chest, 2108 hours. Comparison: Earlier the same day at 2009 hours. Findings: The enteric tube has been retracted slightly. The tip is in the region  
of the gastric fundus. The sidehole remains below the diaphragm. Cardiac  
monitoring leads overlie the chest.  
   
The heart is normal in size. Multifocal airspace disease and a left-sided  
cavitary lesion are again noted. There is no pleural effusion or pneumothorax. There is gaseous distention of the colon. 10/18/21 2017  XR CHEST PORT Final result Impression:  1. Enteric tube as above. 2. Persistent multifocal airspace disease with a cavitary lesion in the left  
lateral lung. Narrative:  AP portable chest, 2009 hours. Comparison: 10/17/2021. Findings: There is an enteric tube in place which coils overlying the gastric  
fundus with the tip projected back towards the gastroesophageal junction. Cardiac monitoring leads overlie the chest. The heart is normal in size. There  
is persistent dense consolidation in the right perihilar region. There is a  
cavitary lesion laterally in the left midlung zone. No pleural effusion or  
pneumothorax is identified. The osseous structures are unremarkable. There is  
gaseous distention of the bowel. 10/17/21 1512  XR CHEST PORT Final result Impression:  1. Large right perihilar opacity, mass versus airspace disease. Further  
characterization with CT is recommended. 2. Nodular opacity in the left midlung zone. Further characterization with CT is  
recommended. Narrative:  AP portable chest, 1500 hours. Comparison: None. Findings: Multiple cardiac monitoring leads overlie the chest. The heart is  
normal in size. There is an 8.4 cm right perihilar opacity. There is a 3.7 cm  
opacity in the left midlung zone. There is no pulmonary vascular congestion. No  
pleural effusion or pneumothorax is identified. The osseous structures are  
unremarkable. Results from Hospital Encounter encounter on 10/17/21 XR CHEST PORT Narrative Left lower lobe cavitary lesion. Comparison chest x-ray 10/31/2021. Impression FINDINGS: IMPRESSION: Single frontal view of the chest. 
 
Normal heart size. No vascular congestion or pulmonary edema. Improved pulmonary inflation. Increasing right upper lung airspace disease. Diminishing left perihilar airspace disease. Small cavitary lesion is again seen 
laterally in the left lower lobe. No sizable pleural effusion or pneumothorax. No free air under the diaphragm. Similar right  subcutaneous. XR ABD (KUB) Narrative Examination: XR ABD (KUB) History: Fecal impaction Comparison: Abdominal radiographs 10/26/2021 FINDINGS: 
 
Frontal views of the abdomen. There is a large amount stool within the rectum. Otherwise there is a nonobstructive bowel gas pattern. Probable gastrostomy tube 
overlies the central abdomen. Left femoral catheter projects over the pelvis. Osseous structures appear unchanged. Please see separate dictations for 
concurrently performed chest radiograph or more detailed evaluation of the 
intrathoracic contents. Impression Large amount stool within the rectum. XR CHEST PORT Narrative Examination: XR CHEST PORT History: Aspiration pneumonia pneumomediastinum Comparison: Chest radiograph 10/28/2021, chest CT 10/28/2021 FINDINGS: 
 
Single frontal portable view of the chest. Removal of endotracheal tube. Elevation of the left hemidiaphragm with somewhat progressed left basilar 
opacities. The opacities in the right perihilar region appear similar to prior. Layering pleural effusion on the left could contribute to the basilar 
predominant opacities. No radiographically evident pneumothorax. Pneumomediastinum seen on the recently performed CT is not well seen 
radiographically. Decreased soft tissue gas in the neck and chest. Cardiac 
silhouette appears normal in size. Mediastinal contours are grossly unchanged 
given differences in patient positioning. Osseous structures appear grossly 
unchanged. Impression 1. Elevation of the left hemidiaphragm with slightly progressed opacities on the 
left. Right-sided opacities appear similar to prior. 2. Removal of endotracheal tube. Results from Hospital Encounter encounter on 10/17/21 CT CHEST WO CONT Narrative CT dose reduction was achieved through use of a standardized protocol tailored 
for this examination and automatic exposure control for dose modulation. Noncontrast study shows dense and patchy consolidation in both lower lobes than 
mild patchy ground glass and micronodular infiltrates in the left upper lobe, 
and also mainly in the adjacent portions of the lower lungs, to the more dense 
pneumonia. Microcystic change in the right lower lobe component without dominant 
cavity. There is moderate pneumomediastinum, mainly anterior, contiguous with extensive 
subcutaneous emphysema extending up into the lower neck and mostly around the 
right chest. The CT of October 17 is not available for direct comparison. No mediastinal or hilar adenopathy. Normal caliber aorta. Small left pleural 
effusion. No significant upper abdominal finding Impression Continued dense and patchy multifocal pneumonia and pneumonitis, 
left greater than right with extensive subcutaneous emphysema and 
pneumomediastinum Discussion patient has gurgling respirations has pharyngeal dysphagia is chronically aspirating. Initial x-ray was suspicious for gastric distention as well very likely has gastroparesis with reflux and aspiration on that. Chest x-ray and CT show bibasilar infiltrates right greater than left consistent with aspiration and there is a cavitary area in the left lung which suggest this is a chronic process. She is on Zosyn I agree with that. Agree n.p.o. status with feeding tube. She likely needs a PEG tube 10/19 now in the ICU due to respiratory distress has a lot of upper airway noise with inability to control oral secretions. Nurses are requesting Robinul however abdominal x-ray is suggestive of ileus with fecal impaction. Robinul could possibly make this worse. Despite this we will place on small dose Robinul. We will give enema start Colace per tube. Nasal MRSA smear and blood culture are negative we will discontinue vancomycin. Potassium to be repleted IV. Sodium remains highly elevated but is minimally improved. 10/20 respirations nonlabored today upper airway noise absent. Will decrease dose Robinul as I am worried about ileus. Abdominal x-ray still suggest fecal impaction. She received an enema yesterday we will repeat today and add sorbitol per G-tube. Would continue NG feedings 10/21 chest x-ray shows bilateral infiltrate with cavitary lesion NG tube is in the gastric fundal region 10/22 condition stable this morning saturation 100% on room air 10/26 seconds of movements noted patient intubated on ventilator 100% FiO2 will change the vent settings abdomen X ray chest x-ray did not shows much different from before still possibility of aspiration 10/28 remains on ventilator we will switch her to spontaneous 10/29 extubated now on nasal cannula 10/30 nasal oxygen up to 5 L have decreased back to 3 L. Assumed continued aspiration oral secretions will increase Robinul to 3 times daily. Will repeat KUB in a.m. if still evidence of fecal impaction we will add Reglan 10/31 oxygen saturation well-maintained but has markedly diminished breath sounds over the left lateral and lower chest with increase infiltrate. May be having worsening mucous plugging. Will discontinue Robinul add Mucomyst per PPB 
11/1 chest x-ray improved and physical exam improved as well. Robinul discontinued due to potential worsening ileus and Reglan started. We will continue acetylcysteine another 24 hours Sophia Luque MD

## 2021-11-01 NOTE — PROGRESS NOTES
Chart reviewed. CM called patients cousin/primary decision maker, Dorota Call @ [phone] to discuss DC planning. Patient had a PEG placed this admission and initial assessment states family is requesting air mattress and suction machine. Wagner Crystal agrees with [facility] for management of PEG tube and chooses [facility] . No preference for DME supplier and infusion company. CM sent referrals.

## 2021-11-01 NOTE — PROGRESS NOTES
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 Patient is nonverbal.  In the ED patient's initial oxygen saturations were in the 80s. Rapid Covid on 10/17/2021 -. She was placed on a nonrebreather. CTA of the chest showed multifocal airspace disease and a small cavity lesion in the left lower lobe. She was admitted to the ICU and started on IV Zosyn. Pulmonology consulted. Labs are significant for a sodium of 164. Patient was slowly weaned to room air but on the evening of 10/19/2020 when she desatted again and transferred to the ICU. She was intubated on 10/26/2021. She was placed on pressors for severe sepsis with septic shock that is associated with elevated lactic acid and tachycardia. She was extubated on 10/29/2021 and weaned off of pressors. .  NG tube was placed to start tube feedings and patient is status post PEG tube placement. She is tolerating her tube feeds well. She was transferred out of the ICU on 10/30/2021. Patient's oxygen saturation is continuingly to wean and currently is on 2 L nasal cannula. She has had 14 days of IV Zosyn. Repeat chest x-ray shows worsening and WBC increasing. Continue with IV Zosyn. Concern for possible aspiration with tube feedings. Aspiration risk protocol with feedings. Started on Reglan. She is been afebrile. KUB shows large amount of stool. On stool softeners. Given Enema. Subjective: Patient is noncommunicative orally.      Current Facility-Administered Medications   Medication Dose Route Frequency    piperacillin-tazobactam (ZOSYN) 3.375 g in 0.9% sodium chloride (MBP/ADV) 100 mL MBP  3.375 g IntraVENous Q8H    pantoprazole (PROTONIX) tablet 40 mg  40 mg Oral ACB    senna (SENOKOT) tablet 17.2 mg  2 Tablet Oral DAILY    albuterol-ipratropium (DUO-NEB) 2.5 MG-0.5 MG/3 ML  3 mL Nebulization Q6HWA RT    acetylcysteine (MUCOMYST) 200 mg/mL (20 %) solution 600 mg  600 mg Nebulization Q6H RT    budesonide (PULMICORT) 500 mcg/2 ml nebulizer suspension  500 mcg Nebulization BID RT    metoclopramide (REGLAN) 5 mg/5 mL oral syrup 5 mg  5 mg Per G Tube Q8H    zinc oxide-white petrolatum 17-57 % topical paste   Topical TID    albuterol-ipratropium (DUO-NEB) 2.5 MG-0.5 MG/3 ML  3 mL Nebulization Q6H PRN    sorbitoL 70 % solution 30 mL  30 mL Per G Tube DAILY    docusate (COLACE) 50 mg/5 mL oral liquid 100 mg  100 mg Per G Tube BID    potassium chloride (KLOR-CON) packet for solution 20 mEq  20 mEq Per NG tube TID WITH MEALS    [Held by provider] mirtazapine (REMERON) tablet 15 mg  15 mg Oral QHS    sodium chloride (NS) flush 5-40 mL  5-40 mL IntraVENous Q8H    sodium chloride (NS) flush 5-40 mL  5-40 mL IntraVENous PRN    acetaminophen (TYLENOL) tablet 650 mg  650 mg Oral Q6H PRN    Or    acetaminophen (TYLENOL) suppository 650 mg  650 mg Rectal Q6H PRN    polyethylene glycol (MIRALAX) packet 17 g  17 g Oral DAILY PRN    ondansetron (ZOFRAN ODT) tablet 4 mg  4 mg Oral Q8H PRN    Or    ondansetron (ZOFRAN) injection 4 mg  4 mg IntraVENous Q6H PRN    enoxaparin (LOVENOX) injection 40 mg  40 mg SubCUTAneous DAILY        Review of Systems  Unable to assess at this time      Objective:     Visit Vitals  /78 (BP 1 Location: Left upper arm, BP Patient Position: At rest)   Pulse (!) 113   Temp 98.2 °F (36.8 °C)   Resp 18   Ht 5' 6\" (1.676 m)   Wt 34 kg (74 lb 15.3 oz)   LMP  (LMP Unknown)   SpO2 94%   BMI 12.10 kg/m²    O2 Flow Rate (L/min): 2 l/min O2 Device: Nasal cannula    Temp (24hrs), Av.3 °F (36.8 °C), Min:97.6 °F (36.4 °C), Max:99 °F (37.2 °C)      No intake/output data recorded. No intake/output data recorded.     PHYSICAL EXAM:  Constitutional: No acute distress  Skin: Extremities and face reveal no rashes. HEENT: Sclerae anicteric. Extra-occular muscles are intact. No oral ulcers. The neck is supple and no masses. Cardiovascular: Regular rate and rhythm. Respiratory:  diminished  GI: Abdomen nondistended, soft, and nontender. Normal active bowel sounds. Musculoskeletal: Contracted  Neurological:  Lethargic. Data Review    Recent Results (from the past 24 hour(s))   CBC WITH AUTOMATED DIFF    Collection Time: 10/31/21 10:24 AM   Result Value Ref Range    WBC 18.9 (H) 3.6 - 11.0 K/uL    RBC 3.29 (L) 3.80 - 5.20 M/uL    HGB 9.3 (L) 11.5 - 16.0 g/dL    HCT 28.6 (L) 35.0 - 47.0 %    MCV 86.9 80.0 - 99.0 FL    MCH 28.3 26.0 - 34.0 PG    MCHC 32.5 30.0 - 36.5 g/dL    RDW 14.5 11.5 - 14.5 %    PLATELET 568 206 - 860 K/uL    MPV 9.4 8.9 - 12.9 FL    NRBC 0.0 0.0  WBC    ABSOLUTE NRBC 0.00 0.00 - 0.01 K/uL    NEUTROPHILS 87 (H) 32 - 75 %    LYMPHOCYTES 8 (L) 12 - 49 %    MONOCYTES 3 (L) 5 - 13 %    EOSINOPHILS 1 0 - 7 %    BASOPHILS 0 0 - 1 %    IMMATURE GRANULOCYTES 1 (H) 0 - 0.5 %    ABS. NEUTROPHILS 16.6 (H) 1.8 - 8.0 K/UL    ABS. LYMPHOCYTES 1.4 0.8 - 3.5 K/UL    ABS. MONOCYTES 0.6 0.0 - 1.0 K/UL    ABS. EOSINOPHILS 0.1 0.0 - 0.4 K/UL    ABS. BASOPHILS 0.0 0.0 - 0.1 K/UL    ABS. IMM.  GRANS. 0.1 (H) 0.00 - 0.04 K/UL    DF AUTOMATED     RENAL FUNCTION PANEL    Collection Time: 10/31/21 10:24 AM   Result Value Ref Range    Sodium 140 136 - 145 mmol/L    Potassium 4.1 3.5 - 5.1 mmol/L    Chloride 108 97 - 108 mmol/L    CO2 28 21 - 32 mmol/L    Anion gap 4 (L) 5 - 15 mmol/L    Glucose 105 (H) 65 - 100 mg/dL    BUN 9 6 - 20 mg/dL    Creatinine <0.15 (L) 0.55 - 1.02 mg/dL    BUN/Creatinine ratio Not calculated 12 - 20      GFR est AA Not calculated >60 ml/min/1.73m2    GFR est non-AA Not calculated >60 ml/min/1.73m2    Calcium 7.9 (L) 8.5 - 10.1 mg/dL    Phosphorus 2.6 2.6 - 4.7 mg/dL    Albumin 1.6 (L) 3.5 - 5.0 g/dL   GLUCOSE, POC    Collection Time: 10/31/21  4:20 PM   Result Value Ref Range    Glucose (POC) 101 65 - 117 mg/dL    Performed by Candido Lynn POC    Collection Time: 11/01/21  7:59 AM   Result Value Ref Range    Glucose (POC) 102 65 - 117 mg/dL    Performed by [facility]        CBC:   Lab Results   Component Value Date/Time    WBC 18.9 (H) 10/31/2021 10:24 AM    RBC 3.29 (L) 10/31/2021 10:24 AM    HGB 9.3 (L) 10/31/2021 10:24 AM    HCT 28.6 (L) 10/31/2021 10:24 AM    PLATELET 926 06/61/8483 10:24 AM     BMP:   Lab Results   Component Value Date/Time    Glucose 105 (H) 10/31/2021 10:24 AM    Sodium 140 10/31/2021 10:24 AM    Potassium 4.1 10/31/2021 10:24 AM    Chloride 108 10/31/2021 10:24 AM    CO2 28 10/31/2021 10:24 AM    BUN 9 10/31/2021 10:24 AM    Creatinine <0.15 (L) 10/31/2021 10:24 AM    Calcium 7.9 (L) 10/31/2021 10:24 AM     Radiology review: CT scans    Assessment:   1. Multifocal bilateral pneumonia/severe sepsis with septic shock  2. Acute hypoxic respiratory failure status post extubation on 10/29/2021  3. Pneumomediastinum  4. Hypernatremia due to dehydration  5. Adult failure to thrive with severe protein calorie malnutrition status post PEG tube placement  6. Hereditary spinocerebellar ataxia with chronic debilitated  7. Possible complicated UTI with negative urine culture    Plan:    1. Patient is on day #14 of Zosyn. Repeat chest x-ray is worsening. Continue with Zosyn  She is been afebrile. WBC slightly elevated. Procalcitonin 0.84. Patient is currently off of pressors. Blood pressure is stable. Started on Reglan  2. Patient is status post extubation. Pulmonology consulted. Patient oxygen saturation is 97% on room air. 3.  CT scan showed pneumomediastinum status post emphysema and bilateral infiltrates. 4.  Electrolytes are stable. BUN 9 with a serum creatinine of 0.14.  5.  Patient is status post PEG tube placement. Tolerating tube feedings. 6.  Chronically bedridden. 7.  Patient has had 14 days of IV Zosyn. Been afebrile. Urine culture negative. 8.  CBC, BMP, procalcitonin in a.m. Dispo: Suspect in 24 to 48 hours pending clinical progression. Most likely will need placement       CODE STATUS full code     DVT prophylaxis: Lovenox  Ulcer prophylaxis: Protonix    Care Plan discussed with: Patient/Family, Nurse and     Total time spent with patient: 33 minutes.

## 2021-11-01 NOTE — PROGRESS NOTES
Comprehensive Nutrition Assessment    Type and Reason for Visit: Reassess (Goal)    Nutrition Recommendations/Plan:   Adjust to Jevity 1.5 via PEG at 220ml, 4x daily, bolus  Flush with 205ml after each feed or per MD      Provides 1320kcal (100%), 56g pro (112%), and 1500ml (100%)     Document TF formula, TF rate, flushes, and GRVS in I/O's    Continue bowel regimen to promote regular BM    Nutrition Assessment:  Admitted for sudden onset of gurgling and respiratory distress witnessed by family. Patient is nonverbal, chronically ill looking; MD rec'd palliative but family would like to continue care. Worsening resp status, transferred in and out of ICU multiple times. Concern for aspiration, started on reglan, now seems to be doing better per PA. Previously intubated and sedated (10/25) receiving osmolite with good tolerance and minimal GRVs. Then extubated 10/28, now on floor. RD to update TF to bolus to ensure tolerance prior to d/c. Earlier in admit, NG placed for nutrition (10/18); PEG placed on 10/23, started on continuous feeds with Jevity for concern of refeeding. Tolerated well. Labs: Glu POC , H/H 9.8/31, Cr 0.15, Ca 8.0. Meds: colace, enoxaparin, reglan, PPI, zosyn, KCl, senna, IVF, sorbitol. Malnutrition Assessment:  Malnutrition Status:  Severe malnutrition    Context:  Chronic illness     Findings of the 6 clinical characteristics of malnutrition:   Energy Intake:  No significant decrease in energy intake  Weight Loss:  No significant weight loss     Body Fat Loss:  7 - Severe body fat loss, Fat overlying ribs, Orbital, Triceps   Muscle Mass Loss:  7 - Severe muscle mass loss, Calf (gastrocnemius), Clavicles (pectoralis &deltoids), Hand (interosseous), Thigh (quadriceps), Temples (temporalis)  Fluid Accumulation:  No significant fluid accumulation,      Estimated Daily Nutrient Needs:  Energy (kcal): 1360kcal (40kcal/kg);  Weight Used for Energy Requirements: Current  Protein (g): 51g (1.5g/kg); Weight Used for Protein Requirements: Current  Fluid (ml/day): 1360ml; Method Used for Fluid Requirements: 1 ml/kcal    Nutrition Related Findings:  NFPE showing severe wasting throughout body. Extremely cachetic. No known N/V/D/C. Last BM 10/30, soft. Requires sorbital and colace for regular BM. S/p enema 10/31. No edema. Wounds:    None       Current Nutrition Therapies:  DIET NPO  ADULT TUBE FEEDING PEG; Standard without Fiber; Delivery Method: Continuous; Continuous Initial Rate (mL/hr): 45; Continuous Advance Tube Feeding: No; Water Flush Volume (mL): 120; Water Flush Frequency: Q 6 hours    Anthropometric Measures:  · Height:  5' 6\" (167.6 cm)  · Current Body Wt:  34 kg (74 lb 15.3 oz)   · Usual Body Wt:  31.3 kg (69 lb)     · Ideal Body Wt:  130 lbs:  57.7 %   · BMI Category:  Underweight (BMI less than 22) age over 72       Nutrition Diagnosis:   · Inadequate oral intake related to cognitive or neurological impairment as evidenced by BMI, severe muscle loss, severe loss of subcutaneous fat    Nutrition Interventions:   Food and/or Nutrient Delivery: Modify tube feeding  Nutrition Education and Counseling: No recommendations at this time  Coordination of Nutrition Care: Continue to monitor while inpatient    Goals:  Pt to meet >75% of EEN via PEG within 3 days. Wt gain +0.5kg/week. Lytes WNL.        Nutrition Monitoring and Evaluation:   Behavioral-Environmental Outcomes: None identified  Food/Nutrient Intake Outcomes: Enteral nutrition intake/tolerance  Physical Signs/Symptoms Outcomes: Weight, Biochemical data, GI status    Discharge Planning:    Enteral nutrition     Electronically signed by Gilbert Shah RD on 11/1/2021 at 2:26 PM    Contact: [phone]

## 2021-11-02 NOTE — PROGRESS NOTES
Suctioned patient several times with large amounts of white, thick secretions obtained. Patient tolerated well. Nurse will continue to monitor and assist as needed.

## 2021-11-02 NOTE — PROGRESS NOTES
Hospitalist Progress Note Subjective:  
Daily Progress Note: 11/2/2021 9:27 AM 
 
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 Patient is nonverbal.  In the ED patient's initial oxygen saturations were in the 80s. Rapid Covid on 10/17/2021 -. She was placed on a nonrebreather. CTA of the chest showed multifocal airspace disease and a small cavity lesion in the left lower lobe. She was admitted to the ICU and started on IV Zosyn. Pulmonology consulted. Labs are significant for a sodium of 164. Patient was slowly weaned to room air but on the evening of 10/19/2020 when she desatted again and transferred to the ICU. She was intubated on 10/26/2021. She was placed on pressors for severe sepsis with septic shock that is associated with elevated lactic acid and tachycardia. She was extubated on 10/29/2021 and weaned off of pressors. .  NG tube was placed to start tube feedings and patient is status post PEG tube placement. She is tolerating her tube feeds well. She was transferred out of the ICU on 10/30/2021. Patient's oxygen saturation is continuingly to wean and currently is on 2 L nasal cannula. She has had 14 days of IV Zosyn. Repeat chest x-ray shows worsening and WBC increasing. Continue with IV Zosyn. Concern for possible aspiration with tube feedings. Aspiration risk protocol with feedings. Started on Reglan. She is been afebrile. KUB shows large amount of stool. On stool softeners. Given Enema. Subjective: Patient is noncommunicative orally. Looks like patient oxygen sats dropped to 79% last night. Currently on 2L at 95% Current Facility-Administered Medications Medication Dose Route Frequency  metoprolol succinate (TOPROL-XL) XL tablet 25 mg  25 mg Oral DAILY  piperacillin-tazobactam (ZOSYN) 3.375 g in 0.9% sodium chloride (MBP/ADV) 100 mL MBP  3.375 g IntraVENous Q8H  
 pantoprazole (PROTONIX) tablet 40 mg  40 mg Oral ACB  senna (SENOKOT) tablet 17.2 mg  2 Tablet Oral DAILY  albuterol-ipratropium (DUO-NEB) 2.5 MG-0.5 MG/3 ML  3 mL Nebulization Q6HWA RT  
 acetylcysteine (MUCOMYST) 200 mg/mL (20 %) solution 600 mg  600 mg Nebulization Q6H RT  
 budesonide (PULMICORT) 500 mcg/2 ml nebulizer suspension  500 mcg Nebulization BID RT  
 metoclopramide (REGLAN) 5 mg/5 mL oral syrup 5 mg  5 mg Per G Tube Q8H  
 zinc oxide-white petrolatum 17-57 % topical paste   Topical TID  albuterol-ipratropium (DUO-NEB) 2.5 MG-0.5 MG/3 ML  3 mL Nebulization Q6H PRN  
 sorbitoL 70 % solution 30 mL  30 mL Per G Tube DAILY  docusate (COLACE) 50 mg/5 mL oral liquid 100 mg  100 mg Per G Tube BID  potassium chloride (KLOR-CON) packet for solution 20 mEq  20 mEq Per NG tube TID WITH MEALS  
 [Held by provider] mirtazapine (REMERON) tablet 15 mg  15 mg Oral QHS  sodium chloride (NS) flush 5-40 mL  5-40 mL IntraVENous Q8H  
 sodium chloride (NS) flush 5-40 mL  5-40 mL IntraVENous PRN  
 acetaminophen (TYLENOL) tablet 650 mg  650 mg Oral Q6H PRN Or  
 acetaminophen (TYLENOL) suppository 650 mg  650 mg Rectal Q6H PRN  polyethylene glycol (MIRALAX) packet 17 g  17 g Oral DAILY PRN  
 ondansetron (ZOFRAN ODT) tablet 4 mg  4 mg Oral Q8H PRN Or  
 ondansetron (ZOFRAN) injection 4 mg  4 mg IntraVENous Q6H PRN  
 enoxaparin (LOVENOX) injection 40 mg  40 mg SubCUTAneous DAILY Review of Systems Unable to assess at this time Objective:  
 
Visit Vitals BP (!) 155/99 Pulse (!) 139 Temp 98.2 °F (36.8 °C) Resp 22 Ht 5' 6\" (1.676 m) Wt 34 kg (74 lb 15.3 oz) LMP  (LMP Unknown) SpO2 95% BMI 12.10 kg/m² O2 Flow Rate (L/min): 2 l/min O2 Device: Nasal cannula Temp (24hrs), Av.3 °F (37.4 °C), Min:98.2 °F (36.8 °C), Max:100.5 °F (38.1 °C) No intake/output data recorded.  
10/31 1901 - 11/02 0700 In: 565 Out: - PHYSICAL EXAM: 
Constitutional: No acute distress Skin: Extremities and face reveal no rashes. HEENT: Sclerae anicteric. Extra-occular muscles are intact. No oral ulcers. The neck is supple and no masses. Cardiovascular: RRR Respiratory:  diminished GI: Abdomen nondistended, soft, and nontender. Normal active bowel sounds. Musculoskeletal: Contracted Neurological:  Lethargic. Data Review Recent Results (from the past 24 hour(s)) GLUCOSE, POC Collection Time: 11/01/21  3:48 PM  
Result Value Ref Range Glucose (POC) 125 (H) 65 - 117 mg/dL Performed by Lynnette Wolff, POC Collection Time: 11/01/21  9:43 PM  
Result Value Ref Range Glucose (POC) 143 (H) 65 - 117 mg/dL Performed by China Guo (MARVIN) CBC:  
Lab Results Component Value Date/Time WBC 24.4 (H) 11/01/2021 09:40 AM  
 RBC 3.47 (L) 11/01/2021 09:40 AM  
 HGB 9.8 (L) 11/01/2021 09:40 AM  
 HCT 31.0 (L) 11/01/2021 09:40 AM  
 PLATELET 211 20/93/3521 09:40 AM  
 
BMP:  
Lab Results Component Value Date/Time Glucose 119 (H) 11/01/2021 09:40 AM  
 Sodium 138 11/01/2021 09:40 AM  
 Potassium 4.0 11/01/2021 09:40 AM  
 Chloride 103 11/01/2021 09:40 AM  
 CO2 30 11/01/2021 09:40 AM  
 BUN 13 11/01/2021 09:40 AM  
 Creatinine <0.15 (L) 11/01/2021 09:40 AM  
 Calcium 8.0 (L) 11/01/2021 09:40 AM  
 
Radiology review: CT scans Assessment:  
1. Multifocal bilateral pneumonia/severe sepsis with septic shock 2. Acute hypoxic respiratory failure status post extubation on 10/29/2021 3. Pneumomediastinum 4. Hypernatremia due to dehydration 5. Adult failure to thrive with severe protein calorie malnutrition status post PEG tube placement 6. Hereditary spinocerebellar ataxia with chronic debilitated 7. Possible complicated UTI with negative urine culture 8. HTN with tachycardia Plan: 1. Patient is on day #16 of Zosyn. Repeat chest x-ray is worsening. Continue with Zosyn  She is been afebrile. WBC slightly elevated. Procalcitonin 0.84. Patient is currently off of pressors. Blood pressure is stable. Started on Reglan 2. Patient is status post extubation. Pulmonology consulted. 3.  CT scan showed pneumomediastinum status post emphysema and bilateral infiltrates. 4.  Electrolytes are stable. BUN 9 with a serum creatinine of 0.15. 
5.  Patient is status post PEG tube placement. Tolerating tube feedings. 6.  Chronically bedridden. 7.  on IV Zosyn. Been afebrile. Urine culture negative. 8.  Start metoprolol 9. CBC and BMP in the AM   
 
Dispo: Suspect in 24 to 48 hours pending clinical progression. Most likely will need placement CODE STATUS full code DVT prophylaxis: Lovenox Ulcer prophylaxis: Protonix Care Plan discussed with: Patient/Family, Nurse and  Total time spent with patient: 32  minutes.

## 2021-11-02 NOTE — PROGRESS NOTES
Bedside and Verbal shift change report given to Will Newberry RN (oncoming nurse) by Ed Mauro LPN (offgoing nurse). Report included the following information SBAR, Kardex, Intake/Output, MAR, Accordion, Recent Results and Med Rec Status.

## 2021-11-02 NOTE — PROGRESS NOTES
Pulmonary, Critical Care Note Name: Freddy Olguin MRN: [id number] : 1985 Hospital: [address] [place] [address] Date: 2021  Admission date: 10/17/2021 Hospital Day: 16 Subjective/Interval History:  
Patient seen on the medical floor. Has best I can understand she is bedbound nonverbal but does eat orally. In any event she has had gurgling respirations were brought to the emergency room has bilateral pneumonia right base greater than left base with a cavitary area in the left lower lung pleural base. She is normally followed at [facility] 
10/19 now in the ICU on room air. Apparently had worsening respiratory status last evening and had desaturation was transferred to the ICU has had frequent oral suctioning and is now back on room air 10/20 NG tube in place currently on room air saturation 98% looks at voice otherwise unresponsive 10/26 events noted patient was transferred to ICU now intubated on the ventilator she was put on 100% nonrebreather mask and then to Ventimask but she did not tolerate subsequently got intubated she was hemodynamically unstable on pressors now 10/30 now on nasal oxygen 5 L saturation 100% looks at voice does not speak or follow commands 10/31 on nasal oxygen 2 L well preserved oxygen saturation but worsening status. Chest x-ray shows worsening infiltrate left mid and upper lung with volume loss more noticeable gurgling respirations and diminished breath sounds over the left chest 
 oxygen saturation well-maintained on 2 L. Exam is improved no upper airway gurgling noise and much better breath sounds on the left / remains on 2 L nasal oxygen saturation 95% reportedly did have a desaturation episode during the night. Hospital Problems  Never Reviewed Codes Class Noted POA Severe protein-calorie malnutrition ([facility] ICD-10-CM: Y35 ICD-9-CM: 060  10/18/2021 Unknown  Acute respiratory failure (HCC) ICD-10-CM: J96.00 
ICD-9-CM: 518.81  10/17/2021 Unknown Failure to thrive in adult ICD-10-CM: R62.7 ICD-9-CM: 783.7  10/17/2021 Unknown Multifocal pneumonia ICD-10-CM: J18.9 ICD-9-CM: 910  10/17/2021 Unknown IMPRESSION:  
1. Acute hypoxic respiratory failure controlled on nasal oxygen 2 L 
2. Aspiration pneumonia worsening infiltrate left midlung may be retained secretions improved on x-ray 11/1 3. Hypothermia resolved 4. Pneumomediastinum  subcutaneous emphysema persists 5. Subcu emphysema improving 6. Hypovolemic shock now off pressors 7. Pharyngeal dysphagia probable continued aspiration 8. Gastroesophageal reflux disease with gastroparesis Reglan started 9. Fecal impaction with ileus 10. Ileus has bowel sounds 11. Severe malnutrition Body mass index is 12.1 kg/m². RECOMMENDATIONS/PLAN:  
1. Extubated on 10/28 nasal oxygen now at 2 L 
2. 10/31 worsening left midlung infiltrate and volume loss probable mucous plugging improved on 11/1 x-ray Mucomyst started 10/31 as well as Reglan will discontinue acetylcysteine and repeat x-ray in a.m. 3. last CAT scan of the chest shows pneumomediastinum with emphysema and bilateral infiltrate 4. Off vasopressors 5. Continue tube feedings per PEG have added Reglan in case she is having reflux with aspiration 6. Remains on Zosyn  
 
 
  
[x] High complexity decision making was performed [x] See my orders for details Subjective/Initial History:  
 
I was asked by Gigi Stinson MD to see Mary Green  a 39 y.o.  female in consultation for a chief complaint of acute hypoxic respiratory failure aspiration pneumonia with cavitation No Known Allergies MAR reviewed and pertinent medications noted or modified as needed Current Facility-Administered Medications Medication  metoprolol succinate (TOPROL-XL) XL tablet 25 mg  
 piperacillin-tazobactam (ZOSYN) 3.375 g in 0.9% sodium chloride (MBP/ADV) 100 mL MBP  pantoprazole (PROTONIX) tablet 40 mg  
 senna (SENOKOT) tablet 17.2 mg  
 albuterol-ipratropium (DUO-NEB) 2.5 MG-0.5 MG/3 ML  
 acetylcysteine (MUCOMYST) 200 mg/mL (20 %) solution 600 mg  budesonide (PULMICORT) 500 mcg/2 ml nebulizer suspension  metoclopramide (REGLAN) 5 mg/5 mL oral syrup 5 mg  zinc oxide-white petrolatum 17-57 % topical paste  albuterol-ipratropium (DUO-NEB) 2.5 MG-0.5 MG/3 ML  sorbitoL 70 % solution 30 mL  docusate (COLACE) 50 mg/5 mL oral liquid 100 mg  potassium chloride (KLOR-CON) packet for solution 20 mEq  [Held by provider] mirtazapine (REMERON) tablet 15 mg  
 sodium chloride (NS) flush 5-40 mL  sodium chloride (NS) flush 5-40 mL  acetaminophen (TYLENOL) tablet 650 mg Or  acetaminophen (TYLENOL) suppository 650 mg  
 polyethylene glycol (MIRALAX) packet 17 g  
 ondansetron (ZOFRAN ODT) tablet 4 mg Or  
 ondansetron (ZOFRAN) injection 4 mg  enoxaparin (LOVENOX) injection 40 mg Patient PCP: Unknown, Provider, MD 
PMH:  has a past medical history of Cerebellar ataxia ([facility] PSH:   has a past surgical history that includes pr breast surgery procedure unlisted and ir insert non tunl cvc over 5 yrs (10/26/2021). FHX: family history is not on file. SHX:  reports that she has never smoked. She has never used smokeless tobacco. She reports that she does not drink alcohol and does not use drugs. Systemic review unobtainable as the patient is nonverbal 
 
 
Objective:  
 
Vital Signs: Telemetry:    normal sinus rhythm Intake/Output:  
Visit Vitals BP (!) 155/99 Pulse (!) 139 Temp 98.2 °F (36.8 °C) Resp 22 Ht 5' 6\" (1.676 m) Wt 34 kg (74 lb 15.3 oz) LMP  (LMP Unknown) SpO2 95% BMI 12.10 kg/m² Temp (24hrs), Av.3 °F (37.4 °C), Min:98.2 °F (36.8 °C), Max:100.5 °F (38.1 °C) O2 Device: Nasal cannula O2 Flow Rate (L/min): 2 l/min Wt Readings from Last 4 Encounters:  
10/21/ 34 kg (74 lb 15.3 oz) Intake/Output Summary (Last 24 hours) at 11/2/2021 1042 Last data filed at 11/1/2021 1839 Gross per 24 hour Intake 565 ml Output  Net 565 ml Last shift:      No intake/output data recorded. Last 3 shifts: 10/31 1901 - 11/02 0700 In: 565 Out: - Physical Exam:  
General:  female; eyes are open looks at voice follows no commands HEENT: NCAT, oral mucosa clear poor dentition Eyes: anicteric; conjunctiva clear tracks with eyes Neck: no nodes, no JVD, no accessory MM use has subcutaneous emphysema upper airway gurgling noise Chest: no deformity, has subcutaneous emphysema Cardiac: Regular rate and rhythm Lungs: Clear anteriorly and laterally both sides but still diminished in the left base Abd: Thin emaciated bowel sounds present Ext: no edema; no joint swelling; No clubbing : clear urine Neuro: Awake tracks with her eyes does not speak does not follow commands Psych-nonverbal unable to assess Skin: warm, dry, no cyanosis;  
Pulses: Brachial radial femoral pulses intact Capillary: Normal capillary refill Neuro contracted upper extremities Labs: 
 
Recent Labs 11/02/21 
7779 11/01/21 
0940 10/31/21 
1024 WBC 16.2* 24.4* 18.9* HGB 9.7* 9.8* 9.3*  
 175 184 Recent Labs 11/02/21 
4588 11/01/21 
0940 10/31/21 
1024  138 140  
K 3.6 4.0 4.1 * 103 108 CO2 28 30 28 GLU 88 119* 105* BUN 14 13 9 CREA <0.15* <0.15* <0.15* CA 8.5 8.0* 7.9*  
PHOS  --   --  2.6 ALB  --   --  1.6*  
11/2 2 L nasal oxygen with saturation 95% 10/30 on 3 L oxygen saturation 98% 10/20 on 2 L nasal oxygen PO2 115 PCO2 40 pH 7.42 Nasal MRSA smear negative Blood 1 of 4 bottles with gram-positive cocci Urine culture no growth COVID-19 antigen negative Procalcitonin 2.21 Lab Results Component Value Date/Time  Culture result: No significant growth, <10,000 CFU/mL 10/18/2021 11:15 AM  
 Culture result: (A) 10/17/2021 05:30 PM  
  Gram Positive Cocci CALLED TO AND READ BACK BY Jame Obrien R.N. 0729 10/19/2021 BY ARYA Culture result:  10/17/2021 05:30 PM  
  Staphylococcus species, coagulase negative growing in 1 of 4 bottles drawn NO SITE Imaging: CXR Results  (Last 48 hours) 
11/1 chest x-ray with better expansion and aeration left lung 10/31 chest x-ray elevated left hemidiaphragm with volume loss increasing left midlung infiltrate 10/19/21 0333  XR CHEST PORT Final result Impression:  FINDINGS: IMPRESSION: Single frontal view of the chest. Patient's left hand  
obscures the left lung/hemithorax. Enteric tube distal tip below left hemidiaphragm. Normal heart size. Similar right perihilar and upper lobe airspace disease. No sizable pleural  
effusion or pneumothorax. To me I agree there is right upper lobe infiltrate there is also left basilar infiltrate with obscuration of the hemidiaphragm Narrative:  Aspiration pneumonia. Comparison chest x-ray 10/18/2021.  
   
  
 10/18/21 2115  XR CHEST PORT Final result Impression:  Slightly retracted enteric tube, otherwise unchanged. Narrative:  AP portable chest, 2108 hours. Comparison: Earlier the same day at 2009 hours. Findings: The enteric tube has been retracted slightly. The tip is in the region  
of the gastric fundus. The sidehole remains below the diaphragm. Cardiac  
monitoring leads overlie the chest.  
   
The heart is normal in size. Multifocal airspace disease and a left-sided  
cavitary lesion are again noted. There is no pleural effusion or pneumothorax. There is gaseous distention of the colon. 10/18/21 2017  XR CHEST PORT Final result Impression:  1. Enteric tube as above. 2. Persistent multifocal airspace disease with a cavitary lesion in the left  
lateral lung. Narrative:  AP portable chest, 2009 hours. Comparison: 10/17/2021. Findings:  There is an enteric tube in place which coils overlying the gastric  
fundus with the tip projected back towards the gastroesophageal junction. Cardiac monitoring leads overlie the chest. The heart is normal in size. There  
is persistent dense consolidation in the right perihilar region. There is a  
cavitary lesion laterally in the left midlung zone. No pleural effusion or  
pneumothorax is identified. The osseous structures are unremarkable. There is  
gaseous distention of the bowel. 10/17/21 1512  XR CHEST PORT Final result Impression:  1. Large right perihilar opacity, mass versus airspace disease. Further  
characterization with CT is recommended. 2. Nodular opacity in the left midlung zone. Further characterization with CT is  
recommended. Narrative:  AP portable chest, 1500 hours. Comparison: None. Findings: Multiple cardiac monitoring leads overlie the chest. The heart is  
normal in size. There is an 8.4 cm right perihilar opacity. There is a 3.7 cm  
opacity in the left midlung zone. There is no pulmonary vascular congestion. No  
pleural effusion or pneumothorax is identified. The osseous structures are  
unremarkable. Results from Hospital Encounter encounter on 10/17/21 XR CHEST PORT Narrative Left lower lobe cavitary lesion. Comparison chest x-ray 10/31/2021. Impression FINDINGS: IMPRESSION: Single frontal view of the chest. 
 
Normal heart size. No vascular congestion or pulmonary edema. Improved pulmonary inflation. Increasing right upper lung airspace disease. Diminishing left perihilar airspace disease. Small cavitary lesion is again seen 
laterally in the left lower lobe. No sizable pleural effusion or pneumothorax. No free air under the diaphragm. Similar right  subcutaneous. XR ABD (KUB) Narrative Examination: XR ABD (KUB) History: Fecal impaction Comparison: Abdominal radiographs 10/26/2021 FINDINGS: 
 
Frontal views of the abdomen.  There is a large amount stool within the rectum. Otherwise there is a nonobstructive bowel gas pattern. Probable gastrostomy tube 
overlies the central abdomen. Left femoral catheter projects over the pelvis. Osseous structures appear unchanged. Please see separate dictations for 
concurrently performed chest radiograph or more detailed evaluation of the 
intrathoracic contents. Impression Large amount stool within the rectum. XR CHEST PORT Narrative Examination: XR CHEST PORT History: Aspiration pneumonia pneumomediastinum Comparison: Chest radiograph 10/28/2021, chest CT 10/28/2021 FINDINGS: 
 
Single frontal portable view of the chest. Removal of endotracheal tube. Elevation of the left hemidiaphragm with somewhat progressed left basilar 
opacities. The opacities in the right perihilar region appear similar to prior. Layering pleural effusion on the left could contribute to the basilar 
predominant opacities. No radiographically evident pneumothorax. Pneumomediastinum seen on the recently performed CT is not well seen 
radiographically. Decreased soft tissue gas in the neck and chest. Cardiac 
silhouette appears normal in size. Mediastinal contours are grossly unchanged 
given differences in patient positioning. Osseous structures appear grossly 
unchanged. Impression 1. Elevation of the left hemidiaphragm with slightly progressed opacities on the 
left. Right-sided opacities appear similar to prior. 2. Removal of endotracheal tube. Results from Hospital Encounter encounter on 10/17/21 CT CHEST WO CONT Narrative CT dose reduction was achieved through use of a standardized protocol tailored 
for this examination and automatic exposure control for dose modulation.  
 
Noncontrast study shows dense and patchy consolidation in both lower lobes than 
mild patchy ground glass and micronodular infiltrates in the left upper lobe, 
and also mainly in the adjacent portions of the lower lungs, to the more dense 
pneumonia. Microcystic change in the right lower lobe component without dominant 
cavity. There is moderate pneumomediastinum, mainly anterior, contiguous with extensive 
subcutaneous emphysema extending up into the lower neck and mostly around the 
right chest. The CT of October 17 is not available for direct comparison. No mediastinal or hilar adenopathy. Normal caliber aorta. Small left pleural 
effusion. No significant upper abdominal finding Impression Continued dense and patchy multifocal pneumonia and pneumonitis, 
left greater than right with extensive subcutaneous emphysema and 
pneumomediastinum Discussion patient has gurgling respirations has pharyngeal dysphagia is chronically aspirating. Initial x-ray was suspicious for gastric distention as well very likely has gastroparesis with reflux and aspiration on that. Chest x-ray and CT show bibasilar infiltrates right greater than left consistent with aspiration and there is a cavitary area in the left lung which suggest this is a chronic process. She is on Zosyn I agree with that. Agree n.p.o. status with feeding tube. She likely needs a PEG tube 10/19 now in the ICU due to respiratory distress has a lot of upper airway noise with inability to control oral secretions. Nurses are requesting Robinul however abdominal x-ray is suggestive of ileus with fecal impaction. Robinul could possibly make this worse. Despite this we will place on small dose Robinul. We will give enema start Colace per tube. Nasal MRSA smear and blood culture are negative we will discontinue vancomycin. Potassium to be repleted IV. Sodium remains highly elevated but is minimally improved. 10/20 respirations nonlabored today upper airway noise absent. Will decrease dose Robinul as I am worried about ileus. Abdominal x-ray still suggest fecal impaction. She received an enema yesterday we will repeat today and add sorbitol per G-tube.   Would continue NG feedings 10/21 chest x-ray shows bilateral infiltrate with cavitary lesion NG tube is in the gastric fundal region 10/22 condition stable this morning saturation 100% on room air 10/26 seconds of movements noted patient intubated on ventilator 100% FiO2 will change the vent settings abdomen X ray chest x-ray did not shows much different from before still possibility of aspiration 10/28 remains on ventilator we will switch her to spontaneous 10/29 extubated now on nasal cannula 10/30 nasal oxygen up to 5 L have decreased back to 3 L. Assumed continued aspiration oral secretions will increase Robinul to 3 times daily. Will repeat KUB in a.m. if still evidence of fecal impaction we will add Reglan 10/31 oxygen saturation well-maintained but has markedly diminished breath sounds over the left lateral and lower chest with increase infiltrate. May be having worsening mucous plugging. Will discontinue Robinul add Mucomyst per PPB 
11/1 chest x-ray improved and physical exam improved as well. Robinul discontinued due to potential worsening ileus and Reglan started. We will continue acetylcysteine another 24 hours 11/2 still has fairly good breath sounds left anterior and lateral but diminished in the base oxygen saturation well-maintained at this point will discontinue acetylcysteine and repeat x-ray in a.m. WBC count elevation 11/1 may have been related to the collapsed left lung although yesterday's x-ray was markedly improved continue to follow Rosalinda Mendze MD

## 2021-11-03 NOTE — PROGRESS NOTES
Bedside and Verbal shift change report given to Anatoly Thornton RN (oncoming nurse) by Tabatha Troy LPN (offgoing nurse). Report included the following information SBAR, Kardex, Intake/Output, MAR, Accordion, Recent Results and Med Rec Status.

## 2021-11-03 NOTE — DISCHARGE SUMMARY
Hospitalist Death Summary     Patient ID:    Vira Capellan  [id number]  63 y.o.  1985    Admit date: 10/17/2021    Discharge date : 11/3/2021    Chronic Diagnoses:    Problem List as of 11/2/2021 Never Reviewed          Codes Class Noted - Resolved    Severe protein-calorie malnutrition ([facility] ICD-10-CM: E43  ICD-9-CM: 262  10/18/2021 - Present        Acute respiratory failure ([facility] ICD-10-CM: J96.00  ICD-9-CM: 518.81  10/17/2021 - Present        Failure to thrive in adult ICD-10-CM: R62.7  ICD-9-CM: 783.7  10/17/2021 - Present        Multifocal pneumonia ICD-10-CM: J18.9  ICD-9-CM: 627  10/17/2021 - Present            Final Diagnoses:   1. Multifocal bilateral pneumonia/severe sepsis with septic shock  2. Acute hypoxic respiratory failure status post extubation on 10/29/2021  3. Pneumomediastinum  4. Hypernatremia due to dehydration  5. Adult failure to thrive with severe protein calorie malnutrition status post PEG tube placement  6. Hereditary spinocerebellar ataxia with chronic debilitated  7. Possible complicated UTI with negative urine culture  8. 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 Patient is nonverbal.  In the ED patient's initial oxygen saturations were in the 80s. Rapid Covid on 10/17/2021 -. She was placed on a nonrebreather. CTA of the chest showed multifocal airspace disease and a small cavity lesion in the left lower lobe. She was admitted to the ICU and started on IV Zosyn. Pulmonology consulted. Labs are significant for a sodium of 164. Patient was slowly weaned to room air but on the evening of 10/19/2020 when she desatted again and transferred to the ICU. She was intubated on 10/26/2021.   She was placed on pressors for severe sepsis with septic shock that is associated with elevated lactic acid and tachycardia. She was extubated on 10/29/2021 and weaned off of pressors. .  NG tube was placed to start tube feedings and patient is status post PEG tube placement. She is tolerating her tube feeds well. She was transferred out of the ICU on 10/30/2021. Patient's oxygen saturation is continuingly to wean and currently is on 2 L nasal cannula. She has had 14 days of IV Zosyn. Repeat chest x-ray shows worsening and WBC increasing. Continue with IV Zosyn. Concern for possible aspiration with tube feedings. Aspiration risk protocol with feedings. Started on Reglan. She is been afebrile. KUB shows large amount of stool. On stool softeners. Given Enema. On 11/2/2021 at 1020PM code blue was called due to resp fialure. ACLS was initiated as no pulse was felt. Please see critical care note for details. Time of death 46PM. NOK was notified.     Time of Death:  11/2/2021 2257  __________________________________________________________      CONSULTATIONS: Pulmonary/Intensive care    Total Time: <30 min

## 2021-11-03 NOTE — PROGRESS NOTES
Spiritual Care Assessment/Progress Note [facility] 
 
 
NAME: Mary Vernon      MRN: [id number] AGE: 39 y.o. SEX: female [employer] Affiliation: Unknown Language: English  
 
11/3/2021     Total Time (in minutes): 46  
 
Spiritual Assessment begun in [address] through conversation with: 
  
    [x]Patient        [x] Family    [] Friend(s) Reason for Consult: Death, Inpatient Spiritual beliefs: (Please include comment if needed) 
   [] Identifies with a rajwinder tradition:     
   [] Supported by a rajwinder community:        
   [x] Claims no spiritual orientation:       
   [] Seeking spiritual identity:            
   [] Adheres to an individual form of spirituality:       
   [] Not able to assess:                   
 
    
Identified resources for coping:  
   [] Prayer                           
   [] Music                  [] Guided Imagery [x] Family/friends                 [] Pet visits [] Devotional reading                         [] Unknown 
   [] Other:                                          
 
 
Interventions offered during this visit: (See comments for more details) Patient Interventions: Prayer (actual), Other (comment) (Silent support) Family/Friend(s): Affirmation of emotions/emotional suffering, Bridging, Coping skills reviewed/reinforced, Initial Assessment, Normalization of emotional/spiritual concerns, Prayer (actual) Plan of Care: 
 
 [] Support spiritual and/or cultural needs  
 [] Support AMD and/or advance care planning process    
 [] Support grieving process 
 [] Coordinate Rites and/or Rituals  
 [] Coordination with community clergy  [] No spiritual needs identified at this time 
 [] Detailed Plan of Care below (See Comments)  [] Make referral to Music Therapy 
[] Make referral to Pet Therapy    
[] Make referral to Addiction services 
[] Make referral to [facility] 
[] Make referral to Spiritual Care Partner 
[] No future visits requested       
[x] Follow up upon further referrals Comments:  Visited patient in the [address] [place] [address] Rd unit for in-patient death. Patient and several family members were present during the visit. Family acknowledged their sadness and grief of losing the patient. They seemed to be processing their grieving emotions tearfully in a quiet way. Provided chaplaincy education, listened with empathy while exploring their needs at this time. Normalized their emotions and affirmed their familial support and affection toward the patient. Provided prayer per their request.  Shared them about various manners in which people grieve and difficulties surrounding upcoming holidays. They seemed grateful for prayer and visit provided. Advised of  availability. Advised nurse to contact [facility] for any further referrals. Chaplains will follow up if further referrals are requested. Chaplain Gabriel Velarde M.Div.  can be reached by calling the  at [facility] 
[phone]

## 2021-11-03 NOTE — PROGRESS NOTES
10:02 PM Patient was deep suctioned and repositioned with head of bed elevated. 10:19 PM Respiratory Therapist in attendance. 10:20 PM Respiratory Therapist informed primary nurse of patient's breathing pattern being abnormal. Primary nurse in attendance, patient observed not breathing, no respiratory effort seen, no pulse felt. With patient being a partial code no compressions were done, patient being ventilated with ambu bag. Nursing supervisor was informed and code blue called. 10:33 PM Nursing supervisor, ICU team and Dr on duty present code continues. Patient given a total of 3 amps of epinephrine, she was intubated however efforts were unsuccessful. Patient was assessed and pronounced dead by Dr Wil Loza at [phone]. 
 
10:40 PM Patient's cousin who is her next of kin was informed of patients condition deteriorating. 11:00 PM Relatives in attendance. 11:25 PM [facility] called 12:10 AM  in attendance.

## 2022-01-01 NOTE — PROGRESS NOTES
Hospitalist Progress Note Daily Progress Note: 10/20/2021 Subjective:  
Patient is seen for follow-up. 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 Patient was noted to be nonverbal, chronically ill looking, contracted and in respiratory distress. Initially oxygen saturations were in the 80s. She was placed on a nonrebreather. CTA of the chest showed multi focal airspace disease and a small cavitary lesion in the left lower lobe. Patient was admitted and started on Zosyn. Family requested DNR CODE STATUS although did agree to intubation if needed Patient seen in ICU. Resting comfortably in no respiratory distress. On room air. Problem List: 
Problem List as of 10/20/2021 Never Reviewed Codes Class Noted - Resolved Severe protein-calorie malnutrition ([facility] [address] ICD-10-CM: V77 ICD-9-CM: 269  10/18/2021 - Present Acute respiratory failure ([facility] [address] ICD-10-CM: J96.00 
ICD-9-CM: 518.81  10/17/2021 - Present Failure to thrive in adult ICD-10-CM: R62.7 ICD-9-CM: 783.7  10/17/2021 - Present Multifocal pneumonia ICD-10-CM: J18.9 ICD-9-CM: 972  10/17/2021 - Present Medications reviewed Current Facility-Administered Medications Medication Dose Route Frequency  sorbitoL 70 % solution 30 mL  30 mL Per G Tube DAILY  glycopyrrolate (ROBINUL) tablet 0.5 mg  0.5 mg Per G Tube BID  docusate (COLACE) 50 mg/5 mL oral liquid 100 mg  100 mg Per G Tube BID  dextrose 5% infusion  75 mL/hr IntraVENous CONTINUOUS  
 potassium chloride (KLOR-CON) packet for solution 20 mEq  20 mEq Per NG tube TID WITH MEALS  
 [Held by provider] mirtazapine (REMERON) tablet 15 mg  15 mg Oral QHS  sodium chloride (NS) flush 5-40 mL  5-40 mL IntraVENous Q8H  
 sodium chloride (NS) flush 5-40 mL  5-40 mL IntraVENous PRN  
 acetaminophen (TYLENOL) tablet 650 mg  650 mg Oral Q6H PRN Or  
 acetaminophen (TYLENOL) suppository 650 mg  650 mg Rectal Q6H PRN  polyethylene glycol (MIRALAX) packet 17 g  17 g Oral DAILY PRN  
 ondansetron (ZOFRAN ODT) tablet 4 mg  4 mg Oral Q8H PRN Or  
 ondansetron (ZOFRAN) injection 4 mg  4 mg IntraVENous Q6H PRN  
 enoxaparin (LOVENOX) injection 40 mg  40 mg SubCUTAneous DAILY  piperacillin-tazobactam (ZOSYN) 3.375 g in 0.9% sodium chloride (MBP/ADV) 100 mL MBP  3.375 g IntraVENous Q8H Review of Systems:  
Review of systems not obtained due to patient factors. Objective:  
Physical Exam:  
 
Visit Vitals /86 Pulse 70 Temp 96.9 °F (36.1 °C) Resp 15 Ht 5' 6\" (1.676 m) Wt 34.5 kg (76 lb 0.9 oz) LMP  (LMP Unknown) SpO2 100% BMI 12.28 kg/m² O2 Flow Rate (L/min): 2 l/min O2 Device: None (Room air) Temp (24hrs), Av.2 °F (36.2 °C), Min:96.5 °F (35.8 °C), Max:98.2 °F (36.8 °C) 
  10/20 0701 - 10/20 1900 In: 10 Out: -    10/18 1901 - 10/20 07 In: 474 [I.V.:200] Out: 200 [Urine:200] General:   Unresponsive, cachectic and emaciated Lungs:    Rhonchi bilateral  
Chest wall:  No tenderness or deformity. Heart:  Regular rate and rhythm, S1, S2 normal, no murmur, click, rub or gallop. Abdomen:   Soft, non-tender. Bowel sounds normal. No masses,  No organomegaly. Extremities:  Contracted Pulses: 2+ and symmetric all extremities. Skin: Skin color, texture, turgor normal. No rashes or lesions Neurologic: CNII-XII intact. Contractures of upper and lower extremities Data Review:  
   
Recent Days: 
Recent Labs 10/19/21 
0500 10/18/21 
0303 10/17/21 
1940 WBC 13.6* 9.6 4.3 HGB 13.4 14.6 14.2 HCT 44.5 49.1* 48.9*  
 272 172 Recent Labs 10/20/21 
0823 10/19/21 
0500 10/18/21 
1358 10/18/21 
0303 10/18/21 
0303 10/17/21 
1940 10/17/21 
1940  160*  --   --  161*   < > 164*  164* K 3.2* 3.0* 4.3   < > 2.6*   < > 2.4*  2.4*  
* 126*  --   --  128*   < > 126*  126* CO2 25 28  --   --  26   < > 23  23 * 123*  --   --  112*   < > 93  93 BUN 10 25*  --   --  35*   < > 36*  36* CREA 0.20* 0.28*  --   --  0.36*   < > 0.25*  0.25* CA 8.0* 8.7  --   --  8.7   < > 7.2*  7.2*  
MG 1.8  --   --   --  2.3  --   --   
PHOS 1.1* 2.0*  --   --   --   --   --   
ALB 1.6* 2.0*  --   --   --   --  1.7* TBILI  --   --   --   --   --   --  0.7 ALT  --   --   --   --   --   --  13  
 < > = values in this interval not displayed. Recent Labs 10/20/21 
0545 PH 7.42  
PCO2 40 PO2 115* HCO3 25  
 
 
24 Hour Results: 
Recent Results (from the past 24 hour(s)) GLUCOSE, POC Collection Time: 10/19/21 12:53 PM  
Result Value Ref Range Glucose (POC) 191 (H) 65 - 117 mg/dL Performed by Arminda Gonsales GLUCOSE, POC Collection Time: 10/19/21  6:11 PM  
Result Value Ref Range Glucose (POC) 110 65 - 117 mg/dL Performed by Gely Varma   
BLOOD GAS, ARTERIAL Collection Time: 10/20/21  5:45 AM  
Result Value Ref Range pH 7.42 7.35 - 7.45    
 PCO2 40 35 - 45 mmHg PO2 115 (H) 75 - 100 mmHg O2 SAT 97 >95 % BICARBONATE 25 22 - 26 mmol/L  
 BASE EXCESS 0.9 0 - 2 mmol/L  
 O2 METHOD Nasal Cannula O2 FLOW RATE 2.0 L/min SITE Left Radial    
 HUGO'S TEST PASS MAGNESIUM Collection Time: 10/20/21  8:23 AM  
Result Value Ref Range Magnesium 1.8 1.6 - 2.4 mg/dL RENAL FUNCTION PANEL Collection Time: 10/20/21  8:23 AM  
Result Value Ref Range Sodium 144 136 - 145 mmol/L Potassium 3.2 (L) 3.5 - 5.1 mmol/L Chloride 113 (H) 97 - 108 mmol/L  
 CO2 25 21 - 32 mmol/L Anion gap 6 5 - 15 mmol/L Glucose 170 (H) 65 - 100 mg/dL BUN 10 6 - 20 mg/dL Creatinine 0.20 (L) 0.55 - 1.02 mg/dL BUN/Creatinine ratio 50 (H) 12 - 20 GFR est AA >60 >60 ml/min/1.73m2 GFR est non-AA >60 >60 ml/min/1.73m2 Calcium 8.0 (L) 8.5 - 10.1 mg/dL  Phosphorus 1.1 (L) 2.6 - 4.7 mg/dL Albumin 1.6 (L) 3.5 - 5.0 g/dL XR CHEST PORT Final Result XR ABD (KUB) Final Result XR ABD PORT  1 V Final Result XR CHEST PORT Final Result FINDINGS: IMPRESSION: Single frontal view of the chest. Patient's left hand  
obscures the left lung/hemithorax. Enteric tube distal tip below left hemidiaphragm. Normal heart size. Similar right perihilar and upper lobe airspace disease. No sizable pleural  
effusion or pneumothorax. XR CHEST PORT Final Result Slightly retracted enteric tube, otherwise unchanged. XR CHEST PORT Final Result 1. Enteric tube as above. 2. Persistent multifocal airspace disease with a cavitary lesion in the left  
lateral lung. CTA CHEST W OR W WO CONT Final Result Negative for pulmonary embolus. Findings the lungs consistent with multilobar pneumonia with concomitant  
bronchiolitis. The cavitary foci may represent pneumatoceles or potentially  
septic emboli, echocardiogram recommended when clinically able. XR CHEST PORT Final Result 1. Large right perihilar opacity, mass versus airspace disease. Further  
characterization with CT is recommended. 2. Nodular opacity in the left midlung zone. Further characterization with CT is  
recommended. XR NECK SOFT TISSUE Final Result No significant soft tissue swelling or radiodense foreign body. XR CHEST PORT    (Results Pending) Assessment: 
Multifocal bilateral pneumonia with cavitary lesion left lower lobe Acute respiratory failure with hypoxia Hypernatremia due to dehydration Sepsis with tachycardia and elevated lactate. Present on admission Hypokalemia Possible complicated UTI Hereditary spinocerebellar ataxia with chronic debilitation Adult failure to thrive Severe protein calorie malnutrition Severe constipation Plan: 
Continue supportive care including IV zosyn/vancomycin Monitor routine electrolytes Replete potassium Continue NG tube feeds Transfer to medical tele floor Prognosis appears guarded Care Plan discussed with: Nurse Dr Lise Mast updated her cousin Sirisha Crump who is legal next of kin. Discussed her very poor condition, severe malnutrition and severe pneumonia. Recommended she consider palliative care but she wants to continue being very aggressive. She is requesting a PEG tube as well, which patient will not be stable for unless her condition improves. For now, we will place an NG tube and start tube feeds Disposition: Continued inpatient care Total time spent with patient: 30 minutes.  
 
Aura Cavanaugh MD 
 Superficial laceration of face

## 2023-06-01 NOTE — PROGRESS NOTES
Day #2 of Vancomycin  Consult provided for this 39 y.o. female for indication of aspiration pneumonia. Abx regimen:  Vancomycin + Zosyn  Concomitant nephrotoxic drugs: Contrast given 10/17  Frequency of BMP: Not scheduled    Recent Labs     10/19/21  0500 10/18/21  0303 10/17/21  1940   WBC 13.6* 9.6 4.3   CREA 0.28* 0.36* 0.25*  0.25*   BUN 25* 35* 36*  36*     Est CrCl: ~55-60 ml/min  Temp (24hrs), Av.7 °F (36.5 °C), Min:96.8 °F (36 °C), Max:98.6 °F (37 °C)    Cultures:   10/17 Blood: pending  10/18 Urine: pending    MRSA Swab: Not Detected    Target range: AUC/-600, trough 15-20 mcg/mL    Impression/Plan:   Afebrile, WBC high, lactic acidosis  Patient has low body mass w/ severe malnutrition, suspect CrCl is artificially high  Level resulted at 6.8 (drawn ~14.5 hours after previous dose). Will conservatively initiate 500 mg Q12H.    Trough ordered for tomorrow 10/20 at 2100  Antimicrobial stop date TBD     LAKISHA Puga Today your blood type is A positive.   Your beta-hCG quantitative hormone level is 668.2.  As we discussed on your ultrasound there is a fibroid in your uterus but no evidence of pregnancy.  It is very important that you follow-up with your OB/GYN or our OB/GYN provider on-call within the next 48 hours for repeat hormone testing and continued monitoring.  Should you develop any new or worsening symptoms please return to the ER for further evaluation.

## (undated) DEVICE — KIT GASTMY PERC PEG PULL 20FR -- ENDOVIVE BX/2